# Patient Record
Sex: MALE | Race: WHITE | NOT HISPANIC OR LATINO | Employment: OTHER | URBAN - METROPOLITAN AREA
[De-identification: names, ages, dates, MRNs, and addresses within clinical notes are randomized per-mention and may not be internally consistent; named-entity substitution may affect disease eponyms.]

---

## 2019-06-13 ENCOUNTER — TELEPHONE (OUTPATIENT)
Dept: FAMILY MEDICINE CLINIC | Facility: CLINIC | Age: 74
End: 2019-06-13

## 2019-07-31 ENCOUNTER — OFFICE VISIT (OUTPATIENT)
Dept: DERMATOLOGY | Facility: CLINIC | Age: 74
End: 2019-07-31
Payer: COMMERCIAL

## 2019-07-31 ENCOUNTER — OFFICE VISIT (OUTPATIENT)
Dept: ENDOCRINOLOGY | Facility: CLINIC | Age: 74
End: 2019-07-31
Payer: COMMERCIAL

## 2019-07-31 VITALS
DIASTOLIC BLOOD PRESSURE: 80 MMHG | HEIGHT: 67 IN | BODY MASS INDEX: 19.97 KG/M2 | WEIGHT: 127.2 LBS | SYSTOLIC BLOOD PRESSURE: 130 MMHG

## 2019-07-31 VITALS — BODY MASS INDEX: 19.93 KG/M2 | WEIGHT: 127 LBS | HEIGHT: 67 IN

## 2019-07-31 DIAGNOSIS — L20.9 ATOPIC DERMATITIS, UNSPECIFIED TYPE: Primary | ICD-10-CM

## 2019-07-31 DIAGNOSIS — L20.9 ATOPIC DERMATITIS, UNSPECIFIED TYPE: ICD-10-CM

## 2019-07-31 DIAGNOSIS — E27.40 ADRENAL INSUFFICIENCY (HCC): Primary | ICD-10-CM

## 2019-07-31 PROCEDURE — 99204 OFFICE O/P NEW MOD 45 MIN: CPT | Performed by: DERMATOLOGY

## 2019-07-31 PROCEDURE — 99204 OFFICE O/P NEW MOD 45 MIN: CPT | Performed by: INTERNAL MEDICINE

## 2019-07-31 RX ORDER — HYDRALAZINE HYDROCHLORIDE 25 MG/1
25 TABLET, FILM COATED ORAL 2 TIMES DAILY
COMMUNITY
End: 2022-01-01 | Stop reason: SDUPTHER

## 2019-07-31 RX ORDER — FERROUS SULFATE 325(65) MG
325 TABLET ORAL
COMMUNITY
Start: 2019-06-20 | End: 2020-01-15

## 2019-07-31 RX ORDER — ATORVASTATIN CALCIUM 80 MG/1
80 TABLET, FILM COATED ORAL DAILY
COMMUNITY
Start: 2018-07-17 | End: 2022-01-01 | Stop reason: SDUPTHER

## 2019-07-31 RX ORDER — PREDNISONE 1 MG/1
5 TABLET ORAL DAILY
Qty: 100 TABLET | Refills: 3 | Status: SHIPPED | OUTPATIENT
Start: 2019-07-31 | End: 2019-09-03

## 2019-07-31 RX ORDER — RIBOFLAVIN (VITAMIN B2) 100 MG
250 TABLET ORAL
COMMUNITY
Start: 2019-06-11 | End: 2020-01-15

## 2019-07-31 RX ORDER — ASPIRIN 81 MG/1
81 TABLET ORAL DAILY
COMMUNITY
Start: 2017-04-16

## 2019-07-31 RX ORDER — OXYCODONE HYDROCHLORIDE 15 MG/1
15 TABLET ORAL EVERY 6 HOURS PRN
COMMUNITY
End: 2020-10-05

## 2019-07-31 RX ORDER — NITROGLYCERIN 0.4 MG/1
0.4 TABLET SUBLINGUAL
COMMUNITY
Start: 2019-05-13

## 2019-07-31 RX ORDER — CLOPIDOGREL BISULFATE 75 MG/1
TABLET ORAL
COMMUNITY
Start: 2019-05-17 | End: 2020-01-15 | Stop reason: ALTCHOICE

## 2019-07-31 RX ORDER — MIRTAZAPINE 30 MG/1
30 TABLET, FILM COATED ORAL
COMMUNITY
Start: 2019-06-19 | End: 2020-01-17 | Stop reason: SDUPTHER

## 2019-07-31 RX ORDER — CARVEDILOL 12.5 MG/1
12.5 TABLET ORAL
COMMUNITY
Start: 2019-06-18 | End: 2020-04-02

## 2019-07-31 RX ORDER — MORPHINE SULFATE 15 MG/1
15 TABLET, FILM COATED, EXTENDED RELEASE ORAL 2 TIMES DAILY
COMMUNITY
Start: 2018-11-11 | End: 2021-01-28 | Stop reason: ALTCHOICE

## 2019-07-31 NOTE — LETTER
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 use for atopic dermatitis  He is having symptoms of adrenal insufficiency since he has not been on steroids for about 6 to 8 weeks  I have asked him to resume prednisone 5 milligrams per day  I did go over sick day management where he should double his dose of the steroids when he is ill  Additionally, he should go to the emergency department if he is unable to take his steroids orally  I gave him a brochure for a medic Alert  Atopic dermatitis  He was seen by Dermatology today with a goal of improving his skin over time and decreasing his steroid dose and/or using steroid sparing medications  Diagnoses and all orders for this visit:    Adrenal insufficiency (HCC)  -     predniSONE 5 mg tablet; Take 1 tablet (5 mg total) by mouth daily    Atopic dermatitis, unspecified type  -     Ambulatory referral to Dermatology; Future    Other orders  -     Ascorbic Acid (VITAMIN C) 100 MG tablet; Take 250 mg by mouth  -     ferrous sulfate 325 (65 Fe) mg tablet; Take 325 mg by mouth  -     atorvastatin (LIPITOR) 80 mg tablet; Take 80 mg by mouth daily  -     aspirin (ASPIRIN 81) 81 mg EC tablet; Take 81 mg by mouth daily  -     carvedilol (COREG) 12 5 mg tablet;  Take 12 5 mg by mouth  -     clopidogrel (PLAVIX) 75 mg tablet; TAKE 1 TABLET BY MOUTH DAILY  - hydrALAZINE (APRESOLINE) 25 mg tablet; Take 25 mg by mouth 2 (two) times a day  -     Mirabegron ER (MYRBETRIQ) 50 MG TB24; Take 50 mg by mouth daily  -     mirtazapine (REMERON) 30 mg tablet; Take 30 mg by mouth  -     morphine (MS CONTIN) 15 mg 12 hr tablet; Take 15 mg by mouth 2 (two) times a day  -     oxyCODONE (ROXICODONE) 15 mg immediate release tablet; Take 15 mg by mouth every 6 (six) hours as needed  -     nitroglycerin (NITROSTAT) 0 4 mg SL tablet; Place 0 4 mg under the tongue          Subjective:      Patient ID: Nathaniel Dodson is a 68 y o  male  68-year-old male with a history of atopic dermatitis since he was in his 25s  He was initially treated with large doses of steroids  He eventually developed secondary adrenal insufficiency for which he was started on prednisone in 2012  About eight weeks ago, his steroids were stopped  Since then, he has lost 25 pounds, has nausea and decreased appetite  He denies any lightheadedness or dizziness  There is no family history of adrenal insufficiency  The following portions of the patient's history were reviewed and updated as appropriate: allergies, current medications, past family history, past medical history, past social history, past surgical history and problem list     Review of Systems   Constitutional: Negative for chills and fever  Respiratory: Negative for shortness of breath  Cardiovascular: Negative for chest pain  Gastrointestinal: Negative for constipation, diarrhea, nausea and vomiting  He does have nausea  All other systems reviewed and are negative  Objective:      /80   Ht 5' 7" (1 702 m)   Wt 57 7 kg (127 lb 3 2 oz)   BMI 19 92 kg/m²           Physical Exam   Constitutional: He is oriented to person, place, and time  He appears well-developed and well-nourished  No distress  HENT:   Head: Normocephalic and atraumatic     Mouth/Throat: Oropharynx is clear and moist and mucous membranes are normal  No oropharyngeal exudate  Eyes: Conjunctivae, EOM and lids are normal  Right eye exhibits no discharge  Left eye exhibits no discharge  No scleral icterus  Neck: Neck supple  No thyromegaly present  Cardiovascular: Normal rate, regular rhythm and normal heart sounds  Exam reveals no gallop and no friction rub  No murmur heard  Pulmonary/Chest: Effort normal and breath sounds normal  No respiratory distress  He has no wheezes  Abdominal: Soft  Bowel sounds are normal  He exhibits no distension  There is no tenderness  Musculoskeletal: Normal range of motion  He exhibits no edema, tenderness or deformity  Lymphadenopathy:        Head (right side): No occipital adenopathy present  Head (left side): No occipital adenopathy present  Right: No supraclavicular adenopathy present  Left: No supraclavicular adenopathy present  Neurological: He is alert and oriented to person, place, and time  No cranial nerve deficit  Coordination normal    Skin: Skin is warm and intact  He is not diaphoretic  No erythema  Multiple areas of atopic dermatitis  Psychiatric: He has a normal mood and affect  His behavior is normal    Vitals reviewed

## 2019-07-31 NOTE — PROGRESS NOTES
[address] [facility] Note     Patient Name: Nilo Guo  Encounter Date: 07/31/2019    Today's Chief Concerns:  Aerigona Concern #1: History atopic dermatitis       Past Medical History:  Have you ever had or currently have any of the following medical conditions or treatments? · HIV/AIDS: No  · Hepatitis B: No  · Hepatitis C: No   · Diabetes: YES  · Tuberculosis: No  · Biologic Therapy/Chemotherapy: No  · Organ or Bone Marrow Transplantation: No  · Radiation Treatment: No  · Cancer (If Yes, which types)- YES, prostate cancer   · Booker's disease: Yes   · Hypertension: Yes      Have you ever had any of the following skin conditions? · Melanoma? (If Yes, please provide more detail)- No  · Basal Cell Carcinoma: No  · Squamous Cell Carcinoma: No  · Sebaceous Cell Carcinoma: No  · Merkel Cell Carcinoma: No  · Angiosarcoma: No  · Blistering Sunburns: No  · Eczema: YES  · Psoriasis: No    Social History:     What is your current Smoking Status? Current every day     What is/was your primary occupation? Retired     What are your hobbies/past-times? Family history:  Do any of your "first degree relatives" (parent, brother, sister, or child) have any of the following conditions? · Melanoma? (If Yes, which relatives?) No  · Eczema: No  · Asthma: No  · Hay Fever/Seasonal Allergies: No  · Psoriasis: No  · Arthritis: No  · Thyroid Problems: No  · Lupus/Connective Tissue Disease: No  · Diabetes: YES  · Stroke: No  · Blood Clots: No  · IBD/Crohn's/Ulcerative Colitis: No  · Vitiligo: No  · Scarring/Keloids: No  · Severe Acne: No  · Pancreatic Cancer: No  · Other known Skin Condition? If Yes, what condition and which relatives?   No  · Lupus: yes     Current Medications:    Current Outpatient Medications:     Ascorbic Acid (VITAMIN C) 100 MG tablet, Take 250 mg by mouth, Disp: , Rfl:     aspirin (ASPIRIN 81) 81 mg EC tablet, Take 81 mg by mouth daily, Disp: , Rfl:     atorvastatin (LIPITOR) 80 mg tablet, Take 80 mg by mouth daily, Disp: , Rfl:     carvedilol (COREG) 12 5 mg tablet, Take 12 5 mg by mouth, Disp: , Rfl:     clopidogrel (PLAVIX) 75 mg tablet, TAKE 1 TABLET BY MOUTH DAILY, Disp: , Rfl:     ferrous sulfate 325 (65 Fe) mg tablet, Take 325 mg by mouth, Disp: , Rfl:     hydrALAZINE (APRESOLINE) 25 mg tablet, Take 25 mg by mouth 2 (two) times a day, Disp: , Rfl:     Mirabegron ER (MYRBETRIQ) 50 MG TB24, Take 50 mg by mouth daily, Disp: , Rfl:     mirtazapine (REMERON) 30 mg tablet, Take 30 mg by mouth, Disp: , Rfl:     morphine (MS CONTIN) 15 mg 12 hr tablet, Take 15 mg by mouth 2 (two) times a day, Disp: , Rfl:     nitroglycerin (NITROSTAT) 0 4 mg SL tablet, Place 0 4 mg under the tongue, Disp: , Rfl:     oxyCODONE (ROXICODONE) 15 mg immediate release tablet, Take 15 mg by mouth every 6 (six) hours as needed, Disp: , Rfl:     predniSONE 5 mg tablet, Take 1 tablet (5 mg total) by mouth daily, Disp: 100 tablet, Rfl: 3    Specific Alerts:    Are you pregnant or planning to become pregnant? N/A    Are you currently or planning to be nursing or breast feeding? N/A    Allergies   Allergen Reactions    Baclofen      Sedation toxicity     Bupropion      Violent     Fentanyl Myalgia     "i lost control of my left arm and left leg"    Pregabalin      Weight gain, become manic       May we call your Preferred Phone number to discuss your specific medical information? YES    May we leave a detailed message that includes your specific medical information? YES    Have you traveled outside of the [facility] in the past 3 months? No    Do you currently have a pacemaker or defibrillator? No    Do you have any artificial heart valves, joints, plates, screws, rods, stents, pins, etc? No   - If Yes, were any placed within the last 2 years? Do you require any medications prior to a surgical procedure? No   - If Yes, for which procedure? - If Yes, what medications to you require?      Are you taking any medications that cause you to bleed more easily ("blood thinners") YES, Plavix    Have you ever experienced a rapid heartbeat with epinephrine? No    Have you ever been treated with "gold" (gold sodium thiomalate) therapy? No    [facility] can help with wrinkles, "laugh lines," facial volume loss, "double chin," "love handles," age spots, and more  Are you interested in learning today about some of the skin enhancement procedures that we offer? (If Yes, please provide more detail) No    Review of Systems:  Have you recently had or currently have any of the following? · Fever or chills: No  · Night Sweats: No  · Headaches: No  · Weight Gain: {No  · Weight Loss: No  · Blurry Vision: No  · Nausea: No  · Vomiting: No  · Diarrhea: No  · Blood in Stool: No  · Abdominal Pain: No  · Itchy Skin: YES  · Painful Joints: No  · Swollen Joints: No  · Muscle Pain: No  · Irregular Mole: No  · Sun Burn: No  · Dry Skin: No  · Skin Color Changes: No  · Scar or Keloid: No  · Cold Sores/Fever Blisters: No  · Bacterial Infections/MRSA: No  · Anxiety: No  · Depression: No  · Suicidal or Homicidal Thoughts: No      PHYSICAL EXAM:      Was a chaperone (Derm Clinical Assistant) present for the entirety of the Physical Exam? YES    Did the Dermatology Team specifically ask and  the patient on the importance of a Full Skin Exam to be sure that nothing is missed clinically?  YES    Did the patient request or accept a Full Skin Exam?  YES    Did the patient specifically refuse to have the areas "under-the-bra" examined by the Dermatologist? No    Did the patient specifically refuse to have the areas "under-the-underwear" examined by the Dermatologist? No      CONSTITUTIONAL:   Vitals:    07/31/19 1155   Weight: 57 6 kg (127 lb)   Height: 5' 7" (1 702 m)           PSYCH: Normal mood and affect  EYES: Normal conjunctiva  ENT: Normal lips and oral mucosa  CARDIOVASCULAR: No edema  RESPIRATORY: Normal respirations  HEME/LYMPH/IMMUNO:  No regional lymphadenopathy except as noted below in ASSESSMENT AND PLAN BY DIAGNOSIS    FULL ORGAN SYSTEM SKIN EXAM (SKIN)  Hair, Scalp, Ears, Face Normal except as noted below in Assessment   Neck, Cervical Chain Nodes Normal except as noted below in Assessment   Right Arm/Hand/Fingers Normal except as noted below in Assessment   Left Arm/Hand/Fingers Normal except as noted below in Assessment   Chest/Breasts/Axillae Viewed areas Normal except as noted below in Assessment   Abdomen, Umbilicus Normal except as noted below in Assessment   Back/Spine Normal except as noted below in Assessment   Groin/Genitalia/Buttocks    Right Leg Normal except as noted below in Assessment   Left Leg Normal except as noted below in Assessment        ASSESSMENT AND PLAN BY DIAGNOSIS:    History of Present Condition:     Duration:  How long has this been an issue for you?    o  since 13years old    Location Affected:  Where on the body is this affecting you?    o  throughout body   Quality:  Is there any bleeding, pain, itch, burning/irritation, or redness associated with the skin lesion? o  itchy   Severity:  Describe any bleeding, pain, itch, burning/irritation, or redness on a scale of 1 to 10 (with 10 being the worst)  o  6   Timing:  Does this condition seem to be there pretty constantly or do you notice it more at specific times throughout the day? o  consistent    Context:  Have you ever noticed that this condition seems to be associated with specific activities you do?    o  denies    Modifying Factors:    o Anything that seems to make the condition worse?    -  denies   o What have you tried to do to make the condition better?    -  oral prednisone    Associated Signs and Symptoms:  Does this skin lesion seem to be associated with any of the following:  o  DERM ASSOCIATED SIGNS AND SYMPTOMS: Redness and Itching and Scratching     1   ATOPIC DERMATITIS    Physical Exam:   Anatomic Location Affected:  Lichenified crusted erythema flexor area of neck, back and diffusely face  Prominent ectropion  Estimated involvement is about 50% TBSA   Morphological Description:     Pertinent Positives:   Pertinent Negatives:  No sign of secondary infection  Additional History of Present Condition:  Patient states his symptoms started when he was 13years old  Patient been on prednisone for years  Assessment and Plan:  Based on a thorough discussion of this condition and the management approach to it (including a comprehensive discussion of the known risks, side effects and potential benefits of treatment), the patient (family) agrees to implement the following specific plan:   Prednisone 20 mg for 1 week, week 2 10 mg and after week 5 mg daily   triamcinolone ointment apply topically twice a day to affected areas    I discussed that she has a life time pattern of atopic dermatitis with erythroderma  There is a a long history of steroid dependence with associated adrenal insufficiency  He is having a generalized flare of disease due to abrupt steroid withdrawal   I suspect that addition of mid potency steroid will bring control of disease to point where patient is confortable and can then taper to targeted prednisone dosage  Dupixent would be option if and only  If we can not proceed with steroid taper  Assessment and Plan:   Atopic Dermatitis is a chronic, itchy skin condition that is very common in children but may occur at any age  It is also known as eczema or atopic eczema   It is the most common form of dermatitis  Atopic dermatitis usually occurs in people who have an atopic tendency    This means they may develop any or all of these closely linked conditions: Atopic dermatitis, asthma, hay fever (allergic rhinitis), eosinophilic esophagitis, and gastroenteritis  Often these conditions run within families with a parent, child or sibling also affected  A family history of asthma, eczema or hay fever is particularly useful in diagnosing atopic dermatitis in infants  Atopic dermatitis arises because of a complex interaction of genetic and environmental factors  These include defects in skin barrier function making the skin more susceptible to irritation by soap and other contact irritants, the weather, temperature and non-specific triggers  There is also an element of immune system dysregulation that is often present  By definition, it is chronic and has a "waxing-waning" nature; flares should be expected but with good education and treatment strategies can be minimized  Some specific tips we discussed:   Dry skin care   Usingonly mild cleansers (hypoallergenic and without fragrances) and fragrance free detergent (not unscented products which contain a masking agent); we discussed avoiding irritants/fragranced products   The importance of regular application of moisturizers daily (at least 3 times a day)   The known and theoretical side effects of steroids at length, including but not limited to atrophy of skin and increased pressure in eye (glaucoma) and clouding of the eye's lens (cataracts) if used in or around the eye for extended durations   The specific over-the-counter interventions and medications   Side effects, risks and benefits of topical and oral medications discussed  EDUCATION AS INTERVENTION! WHAT IS ATOPIC DERMATITIS? Atopic dermatitis (also called eczema) is a condition of the skin where the skin is dry, red, and itchy  The main function of the skin is to provide a barrier from the environment and is also the first defense of the immune system  In atopic dermatitis the skin barrier is decreased or disrupted, and the skin is easily irritated  As a result, moisture escapes the skin more easily, and environmental allergens and microbes can enter the skin more easily  Consequently, the skin's immune system is altered  If there are increased allergic type cells in the skin, the skin may become red and hyper-excitable   This leads to itching and a subsequent rash  WHY DO PEOPLE GET ATOPIC DERMATITIS? There is no single answer because many factors are involved  It is likely a combination of genetic makeup and environmental triggers and/or exposures  Excessive drying or sweating of the skin, Irritating soaps, dust mites, and pet dander are some of the more common triggers  There is no blood test that can be done to confirm this diagnosis  The history and appearance of the skin is usually sufficient for a diagnosis  However, in some cases if the rash does not fit with the history or respond appropriately to treatment, a skin biopsy may be helpful  Many children do outgrow atopic dermatitis or get better; however, many continue to have sensitive skin into adulthood  Asthma and hay fever are often seen in many patients with atopic dermatitis; however, asthma flares do not necessarily occur at the same time as skin flares  PREVENTING FLARES OF ATOPIC DERMATITIS  The first step is to maintain the skin's barrier function  Keep the skin well moisturized  Avoid irritants and triggers  Use prescribed medicine when there are red or rough areas to help the skin to return to normal as quickly as possible  Try to limit scratching  If you keep the skin well moisturized, and avoid coming in contact with things you know irritate your child's skin, there will be less flares  However, some flares of atopic dermatitis are beyond your control  You should work with your health care provider to come up with a plan that minimizes flares while minimizing long term use of medications that suppress the immune system  WHAT ARE SOME OF THE TRIGGERS? Triggers are different for different people  The most common triggers are:   Heat and sweat for some individuals, cold weather for others   House dust mites, pet fur     Wool; synthetic fabrics like nylon; dyed fabrics   Tobacco smoke    Fragrances in: shampoos, soaps, lotions, laundry detergents, fabric softeners   Saliva or prolonged exposure to water  WHAT ABOUT FOOD ALLERGIES? This is a very controversial topic, as many believe that food allergies are responsible for skin flares  In some cases, specific foods may cause worsening of atopic dermatitis; however this occurs in a minority of cases and usually happens within a few hours of ingestion  While food allergy is more common in children with eczema, foods are specific triggers for flares in only a small percentage of children  If you notice that the skin flares after certain foods you can see if eliminating one food at time makes a difference, as long as your child can still enjoy a well-balanced diet  There are blood (RAST) and skin (PRICK) tests that can check for allergies, but they are often positive in children who are not truly allergic  Therefore it is important that you work with your allergist and dermatologist to determine which foods are relevant and causing true symptoms  Extreme food elimination diets without the guidance of your doctor, which have become more popular in recent years, may even result in worsening of the skin rash due to malnutrition and avoidance of essential nutrients  TREATMENT  Treatments are aimed at minimizing exposure to irritating factors and decreasing  the skin inflammation which results in an itchy rash  There are many different treatment options, which depend on your child's rash, its location, and severity  Topical treatments include corticosteroids and steroid-like creams such as Protopic, Elidel, and Eucrisa, which are believed to not thin the skin  Please read the discussions below regarding risks and benefits of all of these creams  Occasionally bacterial or viral infections can occur which flare the skin and require oral and/or topical antibiotics or antivirals   In some cases bleach baths 2-3 times weekly can be helpful to prevent recurrent infection  For severe disease, strong oral medications such as corticosteroids, methotrexate or azathioprine (Imuran) may be needed  These medications require close monitoring and follow-up  You should discuss the risks/ benefits/alternatives of these medications with your health care provider to come up with the best treatment plan for your child  1) Use moisturizer all over the entire body at least THREE TIMES a day  This keeps the skin moisturized to restore the barrier function  Find a cream or ointment that your child likes - this is the most important  The medicines do not work in the bottle  The thicker the moisturizer, generally the better barrier it provides  Ointments often moisturize better than creams; and creams work better than lotions  Lotions are more useful during the summer when thick greasy ointments are uncomfortable  If you put moisturizer on the skin after bathing, while the skin is damp, it is twice as effective  The moisturizer provides a seal holding the water in the skin  You may bathe your child in warm - not hot - water, for short periods of time (no more than 5-10 minutes at a time) once a day if they like  Lightly pat your child dry with a towel and, while the skin is still damp, (within 3 minutes) apply a moisturizer from head to toe  If your child is using a medicated cream, apply it and allow it to absorb completely BEFORE you apply the moisturizer  2) Apply the prescription medication TWICE A DAY to only the red, rough areas on the skin OR AS [place]  Put the medication on your fingers and gently rub it into the areas  Usually the medicine will help an area within a few days time  Try to put the medicine on for two days after you have noticed that the redness is no longer present; this will help the redness from returning    The severity of the rash and the strength and usage of the medication will determine how quickly you see improvement  It is important that you do not overuse steroid creams, and if you notice a thin, shiny appearance to the skin or broken blood vessels, you should stop using the cream and consult your health care provider regarding possible overuse/overthinning of the skin  The face, armpits and groin have particularly thin and sensitive skin and are therefore most at risk for bad results if steroids are over-used in these sites  3) Avoid triggers  Some children have specific things that trigger itching and rashes, while others may have none that can be identified  It may require a little bit of trial and error to see what applies to your child  Also, triggers can change over time for your child  The most common triggers are listed above; start with these  Avoid the use of fabric softeners in the washing machine or dryer sheets (unless they are fragrance-free)  Try to use laundry detergents, soaps and shampoos that are fragrance-free  You may find it helpful to double-rinse your clothes  Some children are sensitive to house dust mites and they may benefit from a plastic mattress wrap  While food allergy is more common in children with eczema, foods are specific triggers for flares in only a small percentage of children  If you notice that the skin flares after certain foods you can see if eliminating one food at time makes a difference, as long as your child can still enjoy a well-balanced diet  4) Consider using a medication like an anti-histamine by mouth to help control the itching  Scratching only makes the skin more reactive and the barrier function even more disrupted  It can cause both children and their parents to lose sleep! There are different types of anti-itch medications  Some cause more drowsiness than others  Both types are acceptable depending on your child and your preference    Start with Benadryl and if that does not work, ask for a prescription antihistamine      5) About the prescription creams:  Corticosteroid creams and ointments (generally things with "-one" or "sherly" on the end of their names): The strength of the cream or ointment depends on the name of the active ingredient  The numbers at the end do not indicate the relative strength  Thus triamcinolone 0 1% ointment, considered a mid-strength corticosteroid, is much stronger than hydrocortisone 1% even though the number following the name is much lower  Topical corticosteroids are very effective in treating atopic dermatitis  When used in the manner prescribed (to rashy areas of skin and for no more than a few weeks at a time to any one area) they are very safe  These are corticosteroids and are anti-inflammatory, not the anabolic steroids like those used illegally by some athletes  Topical non-steroid creams and ointments (immunomodulators): These creams and ointments are also called topical calcineurin inhibitors (TCIs)  These include Protopic ointment and Elidel cream  Crisaborole 2% Tomas Dominique) is a prescription ointment that targets an enzyme called PDE4 (phosphodiesterase 4)  It is used on the skin topically to treat mild-to-moderate eczema in adults and children 3years of age and older  In total, these nonsteroidal prescriptions are used to help decrease itching and redness in the skin  They are not as strong as most steroid creams; however, it is believed that they do not thin the skin when overused  They are generally used as second-line medications, though they may be used alone or in conjunction with topical steroids  In sensitive areas such as the face, underarms or groin, they are often recommended  They can sting inflamed skin, but are generally well tolerated once the skin is healing  The FDA placed a black-box warning on both Elidel and Protopic in 2006 based on animal studies using the medications    Some animals developed skin cancer and lymphoma  Subsequently, the FDA released a statement that there is no causal relationship between the two medications and cancer  Because of this concern, there are ongoing studies to evaluate this relationship in humans  So far, there are studies that support the safety of these medications  One showed that the rates of cancer in patient using these medications topically were less than the rates of the general population and another showed that in patient's using the medication over a large area of the body, the levels of the medication in the blood was undetectable  As for Eucrisa, this product is only approved for the topical treatment of mild-to-moderate eczema in patients 3years of age and older; use of the medication in kids younger than 2 is considered off label and has not been formally studied  Burning and stinging are the most commonly reported side effects of this medication  Rarely, this product has been known to cause hives and hypersensitivity reactions; discontinue its use if you develop severe itching, swelling, or redness in the area of application      Scribe Attestation    I,:   [email or web address] am acting as a scribe while in the presence of the attending physician :        I,:   Shaun Kat MD personally performed the services described in this documentation    as scribed in my presence :

## 2019-07-31 NOTE — PATIENT INSTRUCTIONS
Based on a thorough discussion of this condition and the management approach to it (including a comprehensive discussion of the known risks, side effects and potential benefits of treatment), the patient (family) agrees to implement the following specific plan:   Prednisone 20 mg for 1 week, week 2 10 mg and after week 5 mg daily   triamcinolone ointment apply topically twice a day to affected areas      Assessment and Plan:   Atopic Dermatitis is a chronic, itchy skin condition that is very common in children but may occur at any age  It is also known as eczema or atopic eczema   It is the most common form of dermatitis  Atopic dermatitis usually occurs in people who have an atopic tendency    This means they may develop any or all of these closely linked conditions: Atopic dermatitis, asthma, hay fever (allergic rhinitis), eosinophilic esophagitis, and gastroenteritis  Often these conditions run within families with a parent, child or sibling also affected  A family history of asthma, eczema or hay fever is particularly useful in diagnosing atopic dermatitis in infants  Atopic dermatitis arises because of a complex interaction of genetic and environmental factors  These include defects in skin barrier function making the skin more susceptible to irritation by soap and other contact irritants, the weather, temperature and non-specific triggers  There is also an element of immune system dysregulation that is often present  By definition, it is chronic and has a "waxing-waning" nature; flares should be expected but with good education and treatment strategies can be minimized  Some specific tips we discussed:   Dry skin care   Usingonly mild cleansers (hypoallergenic and without fragrances) and fragrance free detergent (not unscented products which contain a masking agent); we discussed avoiding irritants/fragranced products     The importance of regular application of moisturizers daily (at least 3 times a day)   The known and theoretical side effects of steroids at length, including but not limited to atrophy of skin and increased pressure in eye (glaucoma) and clouding of the eye's lens (cataracts) if used in or around the eye for extended durations   The specific over-the-counter interventions and medications   Side effects, risks and benefits of topical and oral medications discussed  EDUCATION AS INTERVENTION! WHAT IS ATOPIC DERMATITIS? Atopic dermatitis (also called eczema) is a condition of the skin where the skin is dry, red, and itchy  The main function of the skin is to provide a barrier from the environment and is also the first defense of the immune system  In atopic dermatitis the skin barrier is decreased or disrupted, and the skin is easily irritated  As a result, moisture escapes the skin more easily, and environmental allergens and microbes can enter the skin more easily  Consequently, the skin's immune system is altered  If there are increased allergic type cells in the skin, the skin may become red and hyper-excitable   This leads to itching and a subsequent rash  WHY DO PEOPLE GET ATOPIC DERMATITIS? There is no single answer because many factors are involved  It is likely a combination of genetic makeup and environmental triggers and/or exposures  Excessive drying or sweating of the skin, Irritating soaps, dust mites, and pet dander are some of the more common triggers  There is no blood test that can be done to confirm this diagnosis  The history and appearance of the skin is usually sufficient for a diagnosis  However, in some cases if the rash does not fit with the history or respond appropriately to treatment, a skin biopsy may be helpful  Many children do outgrow atopic dermatitis or get better; however, many continue to have sensitive skin into adulthood    Asthma and hay fever are often seen in many patients with atopic dermatitis; however, asthma flares do not necessarily occur at the same time as skin flares  PREVENTING FLARES OF ATOPIC DERMATITIS  The first step is to maintain the skin's barrier function  Keep the skin well moisturized  Avoid irritants and triggers  Use prescribed medicine when there are red or rough areas to help the skin to return to normal as quickly as possible  Try to limit scratching  If you keep the skin well moisturized, and avoid coming in contact with things you know irritate your child's skin, there will be less flares  However, some flares of atopic dermatitis are beyond your control  You should work with your health care provider to come up with a plan that minimizes flares while minimizing long term use of medications that suppress the immune system  WHAT ARE SOME OF THE TRIGGERS? Triggers are different for different people  The most common triggers are:   Heat and sweat for some individuals, cold weather for others   House dust mites, pet fur   Wool; synthetic fabrics like nylon; dyed fabrics   Tobacco smoke    Fragrances in: shampoos, soaps, lotions, laundry detergents, fabric softeners   Saliva or prolonged exposure to water  WHAT ABOUT FOOD ALLERGIES? This is a very controversial topic, as many believe that food allergies are responsible for skin flares  In some cases, specific foods may cause worsening of atopic dermatitis; however this occurs in a minority of cases and usually happens within a few hours of ingestion  While food allergy is more common in children with eczema, foods are specific triggers for flares in only a small percentage of children  If you notice that the skin flares after certain foods you can see if eliminating one food at time makes a difference, as long as your child can still enjoy a well-balanced diet  There are blood (RAST) and skin (PRICK) tests that can check for allergies, but they are often positive in children who are not truly allergic   Therefore it is important that you work with your allergist and dermatologist to determine which foods are relevant and causing true symptoms  Extreme food elimination diets without the guidance of your doctor, which have become more popular in recent years, may even result in worsening of the skin rash due to malnutrition and avoidance of essential nutrients  TREATMENT  Treatments are aimed at minimizing exposure to irritating factors and decreasing  the skin inflammation which results in an itchy rash  There are many different treatment options, which depend on your child's rash, its location, and severity  Topical treatments include corticosteroids and steroid-like creams such as Protopic, Elidel, and Eucrisa, which are believed to not thin the skin  Please read the discussions below regarding risks and benefits of all of these creams  Occasionally bacterial or viral infections can occur which flare the skin and require oral and/or topical antibiotics or antivirals  In some cases bleach baths 2-3 times weekly can be helpful to prevent recurrent infection  For severe disease, strong oral medications such as corticosteroids, methotrexate or azathioprine (Imuran) may be needed  These medications require close monitoring and follow-up  You should discuss the risks/ benefits/alternatives of these medications with your health care provider to come up with the best treatment plan for your child  1) Use moisturizer all over the entire body at least THREE TIMES a day  This keeps the skin moisturized to restore the barrier function  Find a cream or ointment that your child likes - this is the most important  The medicines do not work in the bottle  The thicker the moisturizer, generally the better barrier it provides  Ointments often moisturize better than creams; and creams work better than lotions  Lotions are more useful during the summer when thick greasy ointments are uncomfortable    If you put moisturizer on the skin after bathing, while the skin is damp, it is twice as effective  The moisturizer provides a seal holding the water in the skin  You may bathe your child in warm - not hot - water, for short periods of time (no more than 5-10 minutes at a time) once a day if they like  Lightly pat your child dry with a towel and, while the skin is still damp, (within 3 minutes) apply a moisturizer from head to toe  If your child is using a medicated cream, apply it and allow it to absorb completely BEFORE you apply the moisturizer  2) Apply the prescription medication TWICE A DAY to only the red, rough areas on the skin OR AS [place]  Put the medication on your fingers and gently rub it into the areas  Usually the medicine will help an area within a few days time  Try to put the medicine on for two days after you have noticed that the redness is no longer present; this will help the redness from returning  The severity of the rash and the strength and usage of the medication will determine how quickly you see improvement  It is important that you do not overuse steroid creams, and if you notice a thin, shiny appearance to the skin or broken blood vessels, you should stop using the cream and consult your health care provider regarding possible overuse/overthinning of the skin  The face, armpits and groin have particularly thin and sensitive skin and are therefore most at risk for bad results if steroids are over-used in these sites  3) Avoid triggers  Some children have specific things that trigger itching and rashes, while others may have none that can be identified  It may require a little bit of trial and error to see what applies to your child  Also, triggers can change over time for your child  The most common triggers are listed above; start with these  Avoid the use of fabric softeners in the washing machine or dryer sheets (unless they are fragrance-free)    Try to use laundry detergents, soaps and shampoos that are fragrance-free  You may find it helpful to double-rinse your clothes  Some children are sensitive to house dust mites and they may benefit from a plastic mattress wrap  While food allergy is more common in children with eczema, foods are specific triggers for flares in only a small percentage of children  If you notice that the skin flares after certain foods you can see if eliminating one food at time makes a difference, as long as your child can still enjoy a well-balanced diet  4) Consider using a medication like an anti-histamine by mouth to help control the itching  Scratching only makes the skin more reactive and the barrier function even more disrupted  It can cause both children and their parents to lose sleep! There are different types of anti-itch medications  Some cause more drowsiness than others  Both types are acceptable depending on your child and your preference  Start with Benadryl and if that does not work, ask for a prescription antihistamine      5) About the prescription creams:  Corticosteroid creams and ointments (generally things with "-one" or "sherly" on the end of their names): The strength of the cream or ointment depends on the name of the active ingredient  The numbers at the end do not indicate the relative strength  Thus triamcinolone 0 1% ointment, considered a mid-strength corticosteroid, is much stronger than hydrocortisone 1% even though the number following the name is much lower  Topical corticosteroids are very effective in treating atopic dermatitis  When used in the manner prescribed (to rashy areas of skin and for no more than a few weeks at a time to any one area) they are very safe  These are corticosteroids and are anti-inflammatory, not the anabolic steroids like those used illegally by some athletes  Topical non-steroid creams and ointments (immunomodulators):   These creams and ointments are also called topical calcineurin inhibitors (TCIs)  These include Protopic ointment and Elidel cream  Crisaborole 2% [place]) is a prescription ointment that targets an enzyme called PDE4 (phosphodiesterase 4)  It is used on the skin topically to treat mild-to-moderate eczema in adults and children 3years of age and older  In total, these nonsteroidal prescriptions are used to help decrease itching and redness in the skin  They are not as strong as most steroid creams; however, it is believed that they do not thin the skin when overused  They are generally used as second-line medications, though they may be used alone or in conjunction with topical steroids  In sensitive areas such as the face, underarms or groin, they are often recommended  They can sting inflamed skin, but are generally well tolerated once the skin is healing  The FDA placed a black-box warning on both Elidel and Protopic in 2006 based on animal studies using the medications  Some animals developed skin cancer and lymphoma  Subsequently, the FDA released a statement that there is no causal relationship between the two medications and cancer  Because of this concern, there are ongoing studies to evaluate this relationship in humans  So far, there are studies that support the safety of these medications  One showed that the rates of cancer in patient using these medications topically were less than the rates of the general population and another showed that in patient's using the medication over a large area of the body, the levels of the medication in the blood was undetectable  As for Eucrisa, this product is only approved for the topical treatment of mild-to-moderate eczema in patients 3years of age and older; use of the medication in kids younger than 2 is considered off label and has not been formally studied  Burning and stinging are the most commonly reported side effects of this medication    Rarely, this product has been known to cause hives and hypersensitivity reactions; discontinue its use if you develop severe itching, swelling, or redness in the area of application

## 2019-07-31 NOTE — ASSESSMENT & PLAN NOTE
He was seen by Dermatology today with a goal of improving his skin over time and decreasing his steroid dose and/or using steroid sparing medications

## 2019-07-31 NOTE — ASSESSMENT & PLAN NOTE
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

## 2019-07-31 NOTE — PROGRESS NOTES
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 steroid use for atopic dermatitis  He is having symptoms of adrenal insufficiency since he has not been on steroids for about 6 to 8 weeks  I have asked him to resume prednisone 5 milligrams per day  I did go over sick day management where he should double his dose of the steroids when he is ill  Additionally, he should go to the emergency department if he is unable to take his steroids orally  I gave him a brochure for a medic Alert  Atopic dermatitis  He was seen by Dermatology today with a goal of improving his skin over time and decreasing his steroid dose and/or using steroid sparing medications  Diagnoses and all orders for this visit:    Adrenal insufficiency (HCC)  -     predniSONE 5 mg tablet; Take 1 tablet (5 mg total) by mouth daily    Atopic dermatitis, unspecified type  -     Ambulatory referral to Dermatology; Future    Other orders  -     Ascorbic Acid (VITAMIN C) 100 MG tablet; Take 250 mg by mouth  -     ferrous sulfate 325 (65 Fe) mg tablet; Take 325 mg by mouth  -     atorvastatin (LIPITOR) 80 mg tablet; Take 80 mg by mouth daily  -     aspirin (ASPIRIN 81) 81 mg EC tablet; Take 81 mg by mouth daily  -     carvedilol (COREG) 12 5 mg tablet; Take 12 5 mg by mouth  -     clopidogrel (PLAVIX) 75 mg tablet; TAKE 1 TABLET BY MOUTH DAILY  -     hydrALAZINE (APRESOLINE) 25 mg tablet; Take 25 mg by mouth 2 (two) times a day  -     Mirabegron ER (MYRBETRIQ) 50 MG TB24; Take 50 mg by mouth daily  -     mirtazapine (REMERON) 30 mg tablet; Take 30 mg by mouth  -     morphine (MS CONTIN) 15 mg 12 hr tablet; Take 15 mg by mouth 2 (two) times a day  -     oxyCODONE (ROXICODONE) 15 mg immediate release tablet;  Take 15 mg by mouth every 6 (six) hours as needed  -     nitroglycerin (NITROSTAT) 0 4 mg SL tablet; Place 0 4 mg under the tongue          Subjective:      Patient ID: Juan Crowley is a 68 y o  male  79-year-old male with a history of atopic dermatitis since he was in his 25s  He was initially treated with large doses of steroids  He eventually developed secondary adrenal insufficiency for which he was started on prednisone in 2012  About eight weeks ago, his steroids were stopped  Since then, he has lost 25 pounds, has nausea and decreased appetite  He denies any lightheadedness or dizziness  There is no family history of adrenal insufficiency  The following portions of the patient's history were reviewed and updated as appropriate: allergies, current medications, past family history, past medical history, past social history, past surgical history and problem list     Review of Systems   Constitutional: Negative for chills and fever  Respiratory: Negative for shortness of breath  Cardiovascular: Negative for chest pain  Gastrointestinal: Negative for constipation, diarrhea, nausea and vomiting  He does have nausea  All other systems reviewed and are negative  Objective:      /80   Ht 5' 7" (1 702 m)   Wt 57 7 kg (127 lb 3 2 oz)   BMI 19 92 kg/m²          Physical Exam   Constitutional: He is oriented to person, place, and time  He appears well-developed and well-nourished  No distress  HENT:   Head: Normocephalic and atraumatic  Mouth/Throat: Oropharynx is clear and moist and mucous membranes are normal  No oropharyngeal exudate  Eyes: Conjunctivae, EOM and lids are normal  Right eye exhibits no discharge  Left eye exhibits no discharge  No scleral icterus  Neck: Neck supple  No thyromegaly present  Cardiovascular: Normal rate, regular rhythm and normal heart sounds  Exam reveals no gallop and no friction rub  No murmur heard  Pulmonary/Chest: Effort normal and breath sounds normal  No respiratory distress  He has no wheezes  Abdominal: Soft  Bowel sounds are normal  He exhibits no distension  There is no tenderness     Musculoskeletal: Normal range of motion  He exhibits no edema, tenderness or deformity  Lymphadenopathy:        Head (right side): No occipital adenopathy present  Head (left side): No occipital adenopathy present  Right: No supraclavicular adenopathy present  Left: No supraclavicular adenopathy present  Neurological: He is alert and oriented to person, place, and time  No cranial nerve deficit  Coordination normal    Skin: Skin is warm and intact  He is not diaphoretic  No erythema  Multiple areas of atopic dermatitis  Psychiatric: He has a normal mood and affect  His behavior is normal    Vitals reviewed

## 2019-09-03 ENCOUNTER — OFFICE VISIT (OUTPATIENT)
Dept: DERMATOLOGY | Facility: CLINIC | Age: 74
End: 2019-09-03
Payer: COMMERCIAL

## 2019-09-03 VITALS — TEMPERATURE: 97.8 F | BODY MASS INDEX: 20.4 KG/M2 | WEIGHT: 130 LBS | HEIGHT: 67 IN

## 2019-09-03 DIAGNOSIS — L20.9 ATOPIC DERMATITIS, UNSPECIFIED TYPE: Primary | ICD-10-CM

## 2019-09-03 PROCEDURE — 99213 OFFICE O/P EST LOW 20 MIN: CPT | Performed by: DERMATOLOGY

## 2019-09-03 RX ORDER — BETAMETHASONE VALERATE 0.1 %
LOTION (ML) TOPICAL
Qty: 60 ML | Refills: 3 | Status: SHIPPED | OUTPATIENT
Start: 2019-09-03 | End: 2021-04-12 | Stop reason: ALTCHOICE

## 2019-09-03 RX ORDER — BETAMETHASONE VALERATE 0.1 %
LOTION (ML) TOPICAL
Qty: 60 ML | Refills: 3 | Status: SHIPPED | OUTPATIENT
Start: 2019-09-03 | End: 2019-09-03

## 2019-09-03 RX ORDER — TACROLIMUS 1 MG/G
OINTMENT TOPICAL
Qty: 100 G | Refills: 3 | Status: SHIPPED | OUTPATIENT
Start: 2019-09-03 | End: 2019-09-03

## 2019-09-03 RX ORDER — TACROLIMUS 1 MG/G
OINTMENT TOPICAL
Qty: 100 G | Refills: 3 | Status: SHIPPED | OUTPATIENT
Start: 2019-09-03 | End: 2021-01-01 | Stop reason: ALTCHOICE

## 2019-09-03 RX ORDER — PREDNISONE 1 MG/1
TABLET ORAL
Qty: 60 TABLET | Refills: 0 | Status: SHIPPED | OUTPATIENT
Start: 2019-09-03 | End: 2019-10-22 | Stop reason: SDUPTHER

## 2019-09-03 RX ORDER — PREDNISONE 1 MG/1
TABLET ORAL
Qty: 60 TABLET | Refills: 0 | Status: SHIPPED | OUTPATIENT
Start: 2019-09-03 | End: 2019-09-03

## 2019-09-03 NOTE — PROGRESS NOTES
[facility] Note     Patient Name: Jodie Pleitez  Encounter Date: 09/03/2019    Today's Chief Concerns:  [place] [facility]use Concern #1:  Follow up Atopic dermatitis    Current Medications:    Current Outpatient Medications:     Ascorbic Acid (VITAMIN C) 100 MG tablet, Take 250 mg by mouth, Disp: , Rfl:     aspirin (ASPIRIN 81) 81 mg EC tablet, Take 81 mg by mouth daily, Disp: , Rfl:     atorvastatin (LIPITOR) 80 mg tablet, Take 80 mg by mouth daily, Disp: , Rfl:     carvedilol (COREG) 12 5 mg tablet, Take 12 5 mg by mouth, Disp: , Rfl:     clopidogrel (PLAVIX) 75 mg tablet, TAKE 1 TABLET BY MOUTH DAILY, Disp: , Rfl:     ferrous sulfate 325 (65 Fe) mg tablet, Take 325 mg by mouth, Disp: , Rfl:     hydrALAZINE (APRESOLINE) 25 mg tablet, Take 25 mg by mouth 2 (two) times a day, Disp: , Rfl:     Mirabegron ER (MYRBETRIQ) 50 MG TB24, Take 50 mg by mouth daily, Disp: , Rfl:     mirtazapine (REMERON) 30 mg tablet, Take 30 mg by mouth, Disp: , Rfl:     morphine (MS CONTIN) 15 mg 12 hr tablet, Take 15 mg by mouth 2 (two) times a day, Disp: , Rfl:     nitroglycerin (NITROSTAT) 0 4 mg SL tablet, Place 0 4 mg under the tongue, Disp: , Rfl:     oxyCODONE (ROXICODONE) 15 mg immediate release tablet, Take 15 mg by mouth every 6 (six) hours as needed, Disp: , Rfl:     predniSONE 5 mg tablet, Take 1 tablet (5 mg total) by mouth daily, Disp: 100 tablet, Rfl: 3    triamcinolone (KENALOG) 0 1 % ointment, Apply topically to affected areas twice a day, Disp: 453 6 g, Rfl: 6    Specific Alerts:    Have you been seen by a [facility]ologist in the last 3 years? YES    Are you pregnant or planning to become pregnant? N/A    Are you currently or planning to be nursing or breast feeding?  N/A    Allergies   Allergen Reactions    Baclofen      Sedation toxicity     Bupropion      Violent     Fentanyl Myalgia     "i lost control of my left arm and left leg"    Pregabalin      Weight gain, become manic CONSTITUTIONAL:   Vitals:    09/03/19 0958   Temp: 97 8 °F (36 6 °C)   TempSrc: Tympanic   Weight: 59 kg (130 lb)   Height: 5' 7" (1 702 m)       Today's Height:   5' 7"  Today's Weight (in kilograms):   59 kg  Today's Temperature:   97 8 F    PSYCH: Normal mood and affect  EYES: Normal conjunctiva  ENT: Normal lips and oral mucosa  CARDIOVASCULAR: No edema  RESPIRATORY: Normal respirations  HEME/LYMPH/IMMUNO:  No regional lymphadenopathy except as noted below in ASSESSMENT AND PLAN BY DIAGNOSIS    FULL ORGAN SYSTEM SKIN EXAM (SKIN)  Hair, Scalp, Ears, Face Normal except as noted below in Assessment   Neck, Cervical Chain Nodes Normal except as noted below in Assessment   Right Arm/Hand/Fingers Normal except as noted below in Assessment   Left Arm/Hand/Fingers Normal except as noted below in Assessment   Chest/Breasts/Axillae Viewed areas Normal except as noted below in Assessment   Abdomen, Umbilicus Normal except as noted below in Assessment   Back/Spine Normal except as noted below in Assessment   Groin/Genitalia/Buttocks Viewed areas Normal except as noted below in Assessment   Right Leg, Foot, Toes Normal except as noted below in Assessment   Left Leg, Foot, Toes Normal except as noted below in Assessment        ASSESSMENT AND PLAN BY DIAGNOSIS:    History of Present Condition:       1  ATOPIC DERMATITIS     Physical Exam:   Anatomic Location Affected:  Legs and arms   Morphological Description:  Diffuse xerosis, atrophy popliteal space   Pertinent Positives:   Pertinent Negatives:No regional lymphadenopathy    Additional History of Present Condition:  Rash is almost gone, but still having a lot of itching   90% improved      Assessment and Plan:  Based on a thorough discussion of this condition and the management approach to it (including a comprehensive discussion of the known risks, side effects and potential benefits of treatment), the patient (family) agrees to implement the following specific plan:   Prednisone 5 mg daily   Valisone lotion twice a day   Protopic ointment twice a day   Continue Triamcinolone ointment twice daily   We had and extensive discussion about trying to minmize systemic steroids  I plan to maximize topicals including non steroidal tacrolimus  Dupixent would also be a theoretical option if failure to remain on maitenance corticosteroids  Assessment and Plan:   Atopic Dermatitis is a chronic, itchy skin condition that is very common in children but may occur at any age  It is also known as eczema or atopic eczema   It is the most common form of dermatitis  Atopic dermatitis usually occurs in people who have an atopic tendency    This means they may develop any or all of these closely linked conditions: Atopic dermatitis, asthma, hay fever (allergic rhinitis), eosinophilic esophagitis, and gastroenteritis  Often these conditions run within families with a parent, child or sibling also affected  A family history of asthma, eczema or hay fever is particularly useful in diagnosing atopic dermatitis in infants  Atopic dermatitis arises because of a complex interaction of genetic and environmental factors  These include defects in skin barrier function making the skin more susceptible to irritation by soap and other contact irritants, the weather, temperature and non-specific triggers  There is also an element of immune system dysregulation that is often present  By definition, it is chronic and has a "waxing-waning" nature; flares should be expected but with good education and treatment strategies can be minimized  Some specific tips we discussed:   Dry skin care   Usingonly mild cleansers (hypoallergenic and without fragrances) and fragrance free detergent (not unscented products which contain a masking agent); we discussed avoiding irritants/fragranced products     The importance of regular application of moisturizers daily (at least 3 times a day)   The known and theoretical side effects of steroids at length, including but not limited to atrophy of skin and increased pressure in eye (glaucoma) and clouding of the eye's lens (cataracts) if used in or around the eye for extended durations   The specific over-the-counter interventions and medications   Side effects, risks and benefits of topical and oral medications discussed   After lengthy discussion of etiology and treatment options, we decided to implement the following personalized treatment plan:      YOUR PERSONALIZED ECZEMA ACTION PLAN    FOR ACUTE FLARING    1) Antimicrobials  a) None    b) Clindamycin 75mg/5mL solution:  Take by mouth THREE TIMES A DAY for 10 days straight to help reduce the amount of presumed Staph aureus colonizing the skin  c) Bleach baths:  Soak in a bleach bath (recipe provided via email) about 3 times a week for about 5-10 minutes a day; crucially, make sure to rinse thoroughly with fresh water and apply moisturizer within 3 minutes of toweling off gently  2) Anti-Inflammatories  a) During periods of acute flaring, apply the Hydrocortisone 2 5% ointment to the face and neck TWICE A DAY for 2 weeks straight  To give you an idea of how much medication to use: You should be using at least a single full 30-gram tube of this medication EACH WEEK  b) During periods of acute flaring, apply the Triamcinolone 0 1% ointment to the body TWICE A DAY for 2 weeks straight  To give you an idea of how much medication to use: You should be using at least two full 30-gram tubes of this medication EACH WEEK  Do NOT apply this stronger medication to the face or groin area as the skin is too thin and at greater risk for side effects  3) Moisturizers  a) Apply Eucerin cream or Aquaphor ointment at least 3 times a day  It is best to use moisturizers and prescription medications at DIFFERENT times during the day (ideally, about 30 minutes apart)   If you must apply your prescription medication and your moisturizer at the same time, then ALWAYS apply the prescription medication FIRST (i e , directly to the skin); as we discussed, this allows the prescription medication to reach the skin without being blocked by the thick moisturizer! 4) Oral Antihistamines  a) Cetirizine (Zyrtec): Take 5 mL (1 teaspoon) of 5mg/5mL oral suspension EACH MORNING through allergy season  b) Hydroxyzine (Atarax): Take 5 mL (1 teaspoon) of 12 5mg/5mL oral solution about 1 hour before bedtime for the next 3-5 evenings to help reduce scratching  FOR CHRONIC MAINTENANCE    1) Antimicrobials  a) None    b) Bleach baths:  Soak in a bleach bath (recipe provided via email) about 3 times a week for about 5-10 minutes a day; crucially, make sure to rinse thoroughly with fresh water and apply moisturizer within 3 minutes of toweling off gently  2) Anti-Inflammatories  a) Once in the chronic maintenance phase apply Hydrocortisone 2 5% ointment to the face ONCE A DAY and only on Mondays, Wednesdays and Fridays to help decrease the inflammation; try to decrease to [facility] and Fridays if possible  b) Once in the chronic maintenance phase apply Triamcinolone 0 1% ointment to the body ONCE A DAY and only on Mondays, Wednesdays and Fridays to help decrease the inflammation; try to decrease to [facility] and Fridays if possible  Do NOT apply this stronger medication to your face or groin area as the skin is too thin and at greater risk for side effects  c) Apply crisaborole 2% Orene Girt) ointment TWICE A DAY on Tuesdays, Thursdays, Saturdays and Sundays (i e , the days you are not applying a topical steroid) to both the face/neck and body  As we discussed, this product is approved for the topical treatment of mild-to-moderate eczema in patients 3years of age and older; use of the medication in kids younger than 2 is considered off label and has not been formally studied  Burning and stinging are the most commonly reported side effects of this medication  Rarely, this product has been known to cause hives and hypersensitivity reactions; discontinue its use if you develop severe itching, swelling, or redness in the area of application  3) Moisturizers  a) Continue to apply Eucerin cream or Aquaphor ointment at least 3 times a day  It is best to use moisturizers and prescription medications at DIFFERENT times during the day (ideally, about 30 minutes apart)  If you must apply your prescription medication and your moisturizer at the same time, then ALWAYS apply the prescription medication FIRST (i e , directly to the skin); as we discussed, this allows the prescription medication to reach the skin without being blocked by the thick moisturizer! 4) Oral Antihistamines  Take Cetirizine (Zyrtec): Take 5 mL (1 teaspoon) of 5mg/5mL oral suspension through allergy season  EDUCATION AS INTERVENTION! WHAT IS ATOPIC DERMATITIS? Atopic dermatitis (also called eczema) is a condition of the skin where the skin is dry, red, and itchy  The main function of the skin is to provide a barrier from the environment and is also the first defense of the immune system  In atopic dermatitis the skin barrier is decreased or disrupted, and the skin is easily irritated  As a result, moisture escapes the skin more easily, and environmental allergens and microbes can enter the skin more easily  Consequently, the skin's immune system is altered  If there are increased allergic type cells in the skin, the skin may become red and hyper-excitable   This leads to itching and a subsequent rash  WHY DO PEOPLE GET ATOPIC DERMATITIS? There is no single answer because many factors are involved  It is likely a combination of genetic makeup and environmental triggers and/or exposures   Excessive drying or sweating of the skin, Irritating soaps, dust mites, and pet dander are some of the more common triggers  There is no blood test that can be done to confirm this diagnosis  The history and appearance of the skin is usually sufficient for a diagnosis  However, in some cases if the rash does not fit with the history or respond appropriately to treatment, a skin biopsy may be helpful  Many children do outgrow atopic dermatitis or get better; however, many continue to have sensitive skin into adulthood  Asthma and hay fever are often seen in many patients with atopic dermatitis; however, asthma flares do not necessarily occur at the same time as skin flares  PREVENTING FLARES OF ATOPIC DERMATITIS  The first step is to maintain the skin's barrier function  Keep the skin well moisturized  Avoid irritants and triggers  Use prescribed medicine when there are red or rough areas to help the skin to return to normal as quickly as possible  Try to limit scratching  If you keep the skin well moisturized, and avoid coming in contact with things you know irritate your child's skin, there will be less flares  However, some flares of atopic dermatitis are beyond your control  You should work with your health care provider to come up with a plan that minimizes flares while minimizing long term use of medications that suppress the immune system  WHAT ARE SOME OF THE TRIGGERS? Triggers are different for different people  The most common triggers are:   Heat and sweat for some individuals, cold weather for others   House dust mites, pet fur   Wool; synthetic fabrics like nylon; dyed fabrics   Tobacco smoke    Fragrances in: shampoos, soaps, lotions, laundry detergents, fabric softeners   Saliva or prolonged exposure to water  WHAT ABOUT FOOD ALLERGIES? This is a very controversial topic, as many believe that food allergies are responsible for skin flares   In some cases, specific foods may cause worsening of atopic dermatitis; however this occurs in a minority of cases and usually happens within a few hours of ingestion  While food allergy is more common in children with eczema, foods are specific triggers for flares in only a small percentage of children  If you notice that the skin flares after certain foods you can see if eliminating one food at time makes a difference, as long as your child can still enjoy a well-balanced diet  There are blood (RAST) and skin (PRICK) tests that can check for allergies, but they are often positive in children who are not truly allergic  Therefore it is important that you work with your allergist and dermatologist to determine which foods are relevant and causing true symptoms  Extreme food elimination diets without the guidance of your doctor, which have become more popular in recent years, may even result in worsening of the skin rash due to malnutrition and avoidance of essential nutrients  TREATMENT  Treatments are aimed at minimizing exposure to irritating factors and decreasing  the skin inflammation which results in an itchy rash  There are many different treatment options, which depend on your child's rash, its location, and severity  Topical treatments include corticosteroids and steroid-like creams such as Protopic, Elidel, and Eucrisa, which are believed to not thin the skin  Please read the discussions below regarding risks and benefits of all of these creams  Occasionally bacterial or viral infections can occur which flare the skin and require oral and/or topical antibiotics or antivirals  In some cases bleach baths 2-3 times weekly can be helpful to prevent recurrent infection  For severe disease, strong oral medications such as corticosteroids, methotrexate or azathioprine (Imuran) may be needed  These medications require close monitoring and follow-up  You should discuss the risks/ benefits/alternatives of these medications with your health care provider to come up with the best treatment plan for your child      1) Use moisturizer all over the entire body at least THREE TIMES a day  This keeps the skin moisturized to restore the barrier function  Find a cream or ointment that your child likes - this is the most important  The medicines do not work in the bottle  The thicker the moisturizer, generally the better barrier it provides  Ointments often moisturize better than creams; and creams work better than lotions  Lotions are more useful during the summer when thick greasy ointments are uncomfortable  If you put moisturizer on the skin after bathing, while the skin is damp, it is twice as effective  The moisturizer provides a seal holding the water in the skin  You may bathe your child in warm - not hot - water, for short periods of time (no more than 5-10 minutes at a time) once a day if they like  Lightly pat your child dry with a towel and, while the skin is still damp, (within 3 minutes) apply a moisturizer from head to toe  If your child is using a medicated cream, apply it and allow it to absorb completely BEFORE you apply the moisturizer  2) Apply the prescription medication TWICE A DAY to only the red, rough areas on the skin OR AS [place]  Put the medication on your fingers and gently rub it into the areas  Usually the medicine will help an area within a few days time  Try to put the medicine on for two days after you have noticed that the redness is no longer present; this will help the redness from returning  The severity of the rash and the strength and usage of the medication will determine how quickly you see improvement  It is important that you do not overuse steroid creams, and if you notice a thin, shiny appearance to the skin or broken blood vessels, you should stop using the cream and consult your health care provider regarding possible overuse/overthinning of the skin    The face, armpits and groin have particularly thin and sensitive skin and are therefore most at risk for bad results if steroids are over-used in these sites  3) Avoid triggers  Some children have specific things that trigger itching and rashes, while others may have none that can be identified  It may require a little bit of trial and error to see what applies to your child  Also, triggers can change over time for your child  The most common triggers are listed above; start with these  Avoid the use of fabric softeners in the washing machine or dryer sheets (unless they are fragrance-free)  Try to use laundry detergents, soaps and shampoos that are fragrance-free  You may find it helpful to double-rinse your clothes  Some children are sensitive to house dust mites and they may benefit from a plastic mattress wrap  While food allergy is more common in children with eczema, foods are specific triggers for flares in only a small percentage of children  If you notice that the skin flares after certain foods you can see if eliminating one food at time makes a difference, as long as your child can still enjoy a well-balanced diet  4) Consider using a medication like an anti-histamine by mouth to help control the itching  Scratching only makes the skin more reactive and the barrier function even more disrupted  It can cause both children and their parents to lose sleep! There are different types of anti-itch medications  Some cause more drowsiness than others  Both types are acceptable depending on your child and your preference  Start with Benadryl and if that does not work, ask for a prescription antihistamine      5) About the prescription creams:  Corticosteroid creams and ointments (generally things with "-one" or "sherly" on the end of their names): The strength of the cream or ointment depends on the name of the active ingredient  The numbers at the end do not indicate the relative strength    Thus triamcinolone 0 1% ointment, considered a mid-strength corticosteroid, is much stronger than hydrocortisone 1% even though the number following the name is much lower  Topical corticosteroids are very effective in treating atopic dermatitis  When used in the manner prescribed (to rashy areas of skin and for no more than a few weeks at a time to any one area) they are very safe  These are corticosteroids and are anti-inflammatory, not the anabolic steroids like those used illegally by some athletes  Topical non-steroid creams and ointments (immunomodulators): These creams and ointments are also called topical calcineurin inhibitors (TCIs)  These include Protopic ointment and Elidel cream  Crisaborole 2% Erin Elysia) is a prescription ointment that targets an enzyme called PDE4 (phosphodiesterase 4)  It is used on the skin topically to treat mild-to-moderate eczema in adults and children 3years of age and older  In total, these nonsteroidal prescriptions are used to help decrease itching and redness in the skin  They are not as strong as most steroid creams; however, it is believed that they do not thin the skin when overused  They are generally used as second-line medications, though they may be used alone or in conjunction with topical steroids  In sensitive areas such as the face, underarms or groin, they are often recommended  They can sting inflamed skin, but are generally well tolerated once the skin is healing  The FDA placed a black-box warning on both Elidel and Protopic in 2006 based on animal studies using the medications  Some animals developed skin cancer and lymphoma  Subsequently, the FDA released a statement that there is no causal relationship between the two medications and cancer  Because of this concern, there are ongoing studies to evaluate this relationship in humans  So far, there are studies that support the safety of these medications    One showed that the rates of cancer in patient using these medications topically were less than the rates of the general population and another showed that in patient's using the medication over a large area of the body, the levels of the medication in the blood was undetectable  As for Eucrisa, this product is only approved for the topical treatment of mild-to-moderate eczema in patients 3years of age and older; use of the medication in kids younger than 2 is considered off label and has not been formally studied  Burning and stinging are the most commonly reported side effects of this medication  Rarely, this product has been known to cause hives and hypersensitivity reactions; discontinue its use if you develop severe itching, swelling, or redness in the area of application        Scribe Attestation    I,:   Evelio Simpson MA am acting as a scribe while in the presence of the attending physician :        I,:   Duran Hernandez MD personally performed the services described in this documentation    as scribed in my presence :

## 2019-09-03 NOTE — PATIENT INSTRUCTIONS
1  ATOPIC DERMATITIS     Physical Exam:   Anatomic Location Affected:  Legs and arms   Morphological Description:  Diffuse xerosis, atrophy popliteal space     Based on a thorough discussion of this condition and the management approach to it (including a comprehensive discussion of the known risks, side effects and potential benefits of treatment), the patient (family) agrees to implement the following specific plan:   Prednisone 5 mg daily   Valisone lotion twice a day   Protopic ointment twice a day   Continue Triamcinolone ointment twice daily     Assessment and Plan:   Atopic Dermatitis is a chronic, itchy skin condition that is very common in children but may occur at any age  It is also known as eczema or atopic eczema   It is the most common form of dermatitis  Atopic dermatitis usually occurs in people who have an atopic tendency    This means they may develop any or all of these closely linked conditions: Atopic dermatitis, asthma, hay fever (allergic rhinitis), eosinophilic esophagitis, and gastroenteritis  Often these conditions run within families with a parent, child or sibling also affected  A family history of asthma, eczema or hay fever is particularly useful in diagnosing atopic dermatitis in infants  Atopic dermatitis arises because of a complex interaction of genetic and environmental factors  These include defects in skin barrier function making the skin more susceptible to irritation by soap and other contact irritants, the weather, temperature and non-specific triggers  There is also an element of immune system dysregulation that is often present  By definition, it is chronic and has a "waxing-waning" nature; flares should be expected but with good education and treatment strategies can be minimized  Some specific tips we discussed:   Dry skin care     Usingonly mild cleansers (hypoallergenic and without fragrances) and fragrance free detergent (not unscented products which contain a masking agent); we discussed avoiding irritants/fragranced products   The importance of regular application of moisturizers daily (at least 3 times a day)   The known and theoretical side effects of steroids at length, including but not limited to atrophy of skin and increased pressure in eye (glaucoma) and clouding of the eye's lens (cataracts) if used in or around the eye for extended durations   The specific over-the-counter interventions and medications   Side effects, risks and benefits of topical and oral medications discussed   After lengthy discussion of etiology and treatment options, we decided to implement the following personalized treatment plan:      YOUR PERSONALIZED ECZEMA ACTION PLAN    FOR ACUTE FLARING    1) Antimicrobials  a) None    b) Clindamycin 75mg/5mL solution:  Take by mouth THREE TIMES A DAY for 10 days straight to help reduce the amount of presumed Staph aureus colonizing the skin  c) Bleach baths:  Soak in a bleach bath (recipe provided via email) about 3 times a week for about 5-10 minutes a day; crucially, make sure to rinse thoroughly with fresh water and apply moisturizer within 3 minutes of toweling off gently  2) Anti-Inflammatories  a) During periods of acute flaring, apply the Hydrocortisone 2 5% ointment to the face and neck TWICE A DAY for 2 weeks straight  To give you an idea of how much medication to use: You should be using at least a single full 30-gram tube of this medication EACH WEEK  b) During periods of acute flaring, apply the Triamcinolone 0 1% ointment to the body TWICE A DAY for 2 weeks straight  To give you an idea of how much medication to use: You should be using at least two full 30-gram tubes of this medication EACH WEEK  Do NOT apply this stronger medication to the face or groin area as the skin is too thin and at greater risk for side effects        3) Moisturizers  a) Apply Eucerin cream or Aquaphor ointment at least 3 times a day  It is best to use moisturizers and prescription medications at DIFFERENT times during the day (ideally, about 30 minutes apart)  If you must apply your prescription medication and your moisturizer at the same time, then ALWAYS apply the prescription medication FIRST (i e , directly to the skin); as we discussed, this allows the prescription medication to reach the skin without being blocked by the thick moisturizer! 4) Oral Antihistamines  a) Cetirizine (Zyrtec): Take 5 mL (1 teaspoon) of 5mg/5mL oral suspension EACH MORNING through allergy season  b) Hydroxyzine (Atarax): Take 5 mL (1 teaspoon) of 12 5mg/5mL oral solution about 1 hour before bedtime for the next 3-5 evenings to help reduce scratching  FOR CHRONIC MAINTENANCE    1) Antimicrobials  a) None    b) Bleach baths:  Soak in a bleach bath (recipe provided via email) about 3 times a week for about 5-10 minutes a day; crucially, make sure to rinse thoroughly with fresh water and apply moisturizer within 3 minutes of toweling off gently  2) Anti-Inflammatories  a) Once in the chronic maintenance phase apply Hydrocortisone 2 5% ointment to the face ONCE A DAY and only on Mondays, Wednesdays and Fridays to help decrease the inflammation; try to decrease to [facility] and Fridays if possible  b) Once in the chronic maintenance phase apply Triamcinolone 0 1% ointment to the body ONCE A DAY and only on Mondays, Wednesdays and Fridays to help decrease the inflammation; try to decrease to [facility] and Fridays if possible  Do NOT apply this stronger medication to your face or groin area as the skin is too thin and at greater risk for side effects  c) Apply crisaborole 2% Bell Rout) ointment TWICE A DAY on Tuesdays, Thursdays, Saturdays and Sundays (i e , the days you are not applying a topical steroid) to both the face/neck and body    As we discussed, this product is approved for the topical treatment of mild-to-moderate eczema in patients 3years of age and older; use of the medication in kids younger than 2 is considered off label and has not been formally studied  Burning and stinging are the most commonly reported side effects of this medication  Rarely, this product has been known to cause hives and hypersensitivity reactions; discontinue its use if you develop severe itching, swelling, or redness in the area of application  3) Moisturizers  a) Continue to apply Eucerin cream or Aquaphor ointment at least 3 times a day  It is best to use moisturizers and prescription medications at DIFFERENT times during the day (ideally, about 30 minutes apart)  If you must apply your prescription medication and your moisturizer at the same time, then ALWAYS apply the prescription medication FIRST (i e , directly to the skin); as we discussed, this allows the prescription medication to reach the skin without being blocked by the thick moisturizer! 4) Oral Antihistamines  Take Cetirizine (Zyrtec): Take 5 mL (1 teaspoon) of 5mg/5mL oral suspension through allergy season  EDUCATION AS INTERVENTION! WHAT IS ATOPIC DERMATITIS? Atopic dermatitis (also called eczema) is a condition of the skin where the skin is dry, red, and itchy  The main function of the skin is to provide a barrier from the environment and is also the first defense of the immune system  In atopic dermatitis the skin barrier is decreased or disrupted, and the skin is easily irritated  As a result, moisture escapes the skin more easily, and environmental allergens and microbes can enter the skin more easily  Consequently, the skin's immune system is altered  If there are increased allergic type cells in the skin, the skin may become red and hyper-excitable   This leads to itching and a subsequent rash  WHY DO PEOPLE GET ATOPIC DERMATITIS? There is no single answer because many factors are involved    It is likely a combination of genetic makeup and environmental triggers and/or exposures  Excessive drying or sweating of the skin, Irritating soaps, dust mites, and pet dander are some of the more common triggers  There is no blood test that can be done to confirm this diagnosis  The history and appearance of the skin is usually sufficient for a diagnosis  However, in some cases if the rash does not fit with the history or respond appropriately to treatment, a skin biopsy may be helpful  Many children do outgrow atopic dermatitis or get better; however, many continue to have sensitive skin into adulthood  Asthma and hay fever are often seen in many patients with atopic dermatitis; however, asthma flares do not necessarily occur at the same time as skin flares  PREVENTING FLARES OF ATOPIC DERMATITIS  The first step is to maintain the skin's barrier function  Keep the skin well moisturized  Avoid irritants and triggers  Use prescribed medicine when there are red or rough areas to help the skin to return to normal as quickly as possible  Try to limit scratching  If you keep the skin well moisturized, and avoid coming in contact with things you know irritate your child's skin, there will be less flares  However, some flares of atopic dermatitis are beyond your control  You should work with your health care provider to come up with a plan that minimizes flares while minimizing long term use of medications that suppress the immune system  WHAT ARE SOME OF THE TRIGGERS? Triggers are different for different people  The most common triggers are:   Heat and sweat for some individuals, cold weather for others   House dust mites, pet fur   Wool; synthetic fabrics like nylon; dyed fabrics   Tobacco smoke    Fragrances in: shampoos, soaps, lotions, laundry detergents, fabric softeners   Saliva or prolonged exposure to water  WHAT ABOUT FOOD ALLERGIES?   This is a very controversial topic, as many believe that food allergies are responsible for skin flares  In some cases, specific foods may cause worsening of atopic dermatitis; however this occurs in a minority of cases and usually happens within a few hours of ingestion  While food allergy is more common in children with eczema, foods are specific triggers for flares in only a small percentage of children  If you notice that the skin flares after certain foods you can see if eliminating one food at time makes a difference, as long as your child can still enjoy a well-balanced diet  There are blood (RAST) and skin (PRICK) tests that can check for allergies, but they are often positive in children who are not truly allergic  Therefore it is important that you work with your allergist and dermatologist to determine which foods are relevant and causing true symptoms  Extreme food elimination diets without the guidance of your doctor, which have become more popular in recent years, may even result in worsening of the skin rash due to malnutrition and avoidance of essential nutrients  TREATMENT  Treatments are aimed at minimizing exposure to irritating factors and decreasing  the skin inflammation which results in an itchy rash  There are many different treatment options, which depend on your child's rash, its location, and severity  Topical treatments include corticosteroids and steroid-like creams such as Protopic, Elidel, and Eucrisa, which are believed to not thin the skin  Please read the discussions below regarding risks and benefits of all of these creams  Occasionally bacterial or viral infections can occur which flare the skin and require oral and/or topical antibiotics or antivirals  In some cases bleach baths 2-3 times weekly can be helpful to prevent recurrent infection  For severe disease, strong oral medications such as corticosteroids, methotrexate or azathioprine (Imuran) may be needed  These medications require close monitoring and follow-up   You should discuss the risks/ benefits/alternatives of these medications with your health care provider to come up with the best treatment plan for your child  1) Use moisturizer all over the entire body at least THREE TIMES a day  This keeps the skin moisturized to restore the barrier function  Find a cream or ointment that your child likes - this is the most important  The medicines do not work in the bottle  The thicker the moisturizer, generally the better barrier it provides  Ointments often moisturize better than creams; and creams work better than lotions  Lotions are more useful during the summer when thick greasy ointments are uncomfortable  If you put moisturizer on the skin after bathing, while the skin is damp, it is twice as effective  The moisturizer provides a seal holding the water in the skin  You may bathe your child in warm - not hot - water, for short periods of time (no more than 5-10 minutes at a time) once a day if they like  Lightly pat your child dry with a towel and, while the skin is still damp, (within 3 minutes) apply a moisturizer from head to toe  If your child is using a medicated cream, apply it and allow it to absorb completely BEFORE you apply the moisturizer  2) Apply the prescription medication TWICE A DAY to only the red, rough areas on the skin OR [place]  Put the medication on your fingers and gently rub it into the areas  Usually the medicine will help an area within a few days time  Try to put the medicine on for two days after you have noticed that the redness is no longer present; this will help the redness from returning  The severity of the rash and the strength and usage of the medication will determine how quickly you see improvement      It is important that you do not overuse steroid creams, and if you notice a thin, shiny appearance to the skin or broken blood vessels, you should stop using the cream and consult your health care provider regarding possible overuse/overthinning of the skin  The face, armpits and groin have particularly thin and sensitive skin and are therefore most at risk for bad results if steroids are over-used in these sites  3) Avoid triggers  Some children have specific things that trigger itching and rashes, while others may have none that can be identified  It may require a little bit of trial and error to see what applies to your child  Also, triggers can change over time for your child  The most common triggers are listed above; start with these  Avoid the use of fabric softeners in the washing machine or dryer sheets (unless they are fragrance-free)  Try to use laundry detergents, soaps and shampoos that are fragrance-free  You may find it helpful to double-rinse your clothes  Some children are sensitive to house dust mites and they may benefit from a plastic mattress wrap  While food allergy is more common in children with eczema, foods are specific triggers for flares in only a small percentage of children  If you notice that the skin flares after certain foods you can see if eliminating one food at time makes a difference, as long as your child can still enjoy a well-balanced diet  4) Consider using a medication like an anti-histamine by mouth to help control the itching  Scratching only makes the skin more reactive and the barrier function even more disrupted  It can cause both children and their parents to lose sleep! There are different types of anti-itch medications  Some cause more drowsiness than others  Both types are acceptable depending on your child and your preference  Start with Benadryl and if that does not work, ask for a prescription antihistamine      5) About the prescription creams:  Corticosteroid creams and ointments (generally things with "-one" or "sherly" on the end of their names):   The strength of the cream or ointment depends on the name of the active ingredient  The numbers at the end do not indicate the relative strength  Thus triamcinolone 0 1% ointment, considered a mid-strength corticosteroid, is much stronger than hydrocortisone 1% even though the number following the name is much lower  Topical corticosteroids are very effective in treating atopic dermatitis  When used in the manner prescribed (to rashy areas of skin and for no more than a few weeks at a time to any one area) they are very safe  These are corticosteroids and are anti-inflammatory, not the anabolic steroids like those used illegally by some athletes  Topical non-steroid creams and ointments (immunomodulators): These creams and ointments are also called topical calcineurin inhibitors (TCIs)  These include Protopic ointment and Elidel cream  Crisaborole 2% Jannet Carpenter) is a prescription ointment that targets an enzyme called PDE4 (phosphodiesterase 4)  It is used on the skin topically to treat mild-to-moderate eczema in adults and children 3years of age and older  In total, these nonsteroidal prescriptions are used to help decrease itching and redness in the skin  They are not as strong as most steroid creams; however, it is believed that they do not thin the skin when overused  They are generally used as second-line medications, though they may be used alone or in conjunction with topical steroids  In sensitive areas such as the face, underarms or groin, they are often recommended  They can sting inflamed skin, but are generally well tolerated once the skin is healing  The FDA placed a black-box warning on both Elidel and Protopic in 2006 based on animal studies using the medications  Some animals developed skin cancer and lymphoma  Subsequently, the FDA released a statement that there is no causal relationship between the two medications and cancer  Because of this concern, there are ongoing studies to evaluate this relationship in humans    So far, there are studies that support the safety of these medications  One showed that the rates of cancer in patient using these medications topically were less than the rates of the general population and another showed that in patient's using the medication over a large area of the body, the levels of the medication in the blood was undetectable  As for Eucrisa, this product is only approved for the topical treatment of mild-to-moderate eczema in patients 3years of age and older; use of the medication in kids younger than 2 is considered off label and has not been formally studied  Burning and stinging are the most commonly reported side effects of this medication  Rarely, this product has been known to cause hives and hypersensitivity reactions; discontinue its use if you develop severe itching, swelling, or redness in the area of application

## 2019-10-22 ENCOUNTER — TELEPHONE (OUTPATIENT)
Dept: ENDOCRINOLOGY | Facility: CLINIC | Age: 74
End: 2019-10-22

## 2019-10-22 DIAGNOSIS — L20.9 ATOPIC DERMATITIS, UNSPECIFIED TYPE: ICD-10-CM

## 2019-10-22 RX ORDER — PREDNISONE 1 MG/1
TABLET ORAL
Qty: 45 TABLET | Refills: 0 | Status: SHIPPED | OUTPATIENT
Start: 2019-10-22 | End: 2019-11-01 | Stop reason: SDUPTHER

## 2019-10-22 NOTE — TELEPHONE ENCOUNTER
----- Message from Edgardo Nayak sent at 10/21/2019 11:04 AM EDT -----  Regarding: RE: RE: RE: Non-Urgent Medical Question  Contact: [phone]    I need a refill of prednisone   thank you  ----- Message -----  From: Abraham Rios MD  Sent: 9/12/19 12:01 PM  To: Edgardo Nayak  Subject: RE: RE: Non-Urgent Medical Question    Because you do not have an appetite, I think it would be reasonable to increase the prednisone to 7 5 mg  If in a month you're still having difficulty with appetite, I would recommend seeing a gastroenterologist to make sure there is nothing else causing the poor appetite  Allen MD      ----- Message -----     From: Edgardo Nayak     Sent: 9/12/2019 10:32 AM EDT       To: Abraham Rios MD  Subject: RE: RE: Non-Urgent Medical Question    Both actually  My skin is about 80% clear  I have no appetite, I force myself to eat  I just felt better before   Now I don't feel that good  ----- Message -----  From: Abraham Rios MD  Sent: 9/12/19 8:37 AM  To: Edgardo Nayak  Subject: RE: Non-Urgent Medical Question    When use a that it does not work great, do you mean for the adrenal insufficiency or for your skin condition? Please give me some more details if you mean for the adrenal insufficiency  Abraham Rios MD      ----- Message -----     From: Edgardo Nayak     Sent: 9/11/2019  2:43 PM EDT       To: Abraham Rios MD  Subject: Non-Urgent Medical Question    I have been on 5mg of prednisone for a while  I works but not great  10 mg works great but I think I should try 7 5 mg and see how that works for me    Demetrius Childers thank you

## 2019-10-22 NOTE — TELEPHONE ENCOUNTER
Do you want to refill the Prednisone at this time or place a referral for GI? It seems it has already been a month since the increased dose

## 2019-11-01 ENCOUNTER — OFFICE VISIT (OUTPATIENT)
Dept: ENDOCRINOLOGY | Facility: CLINIC | Age: 74
End: 2019-11-01
Payer: COMMERCIAL

## 2019-11-01 VITALS
BODY MASS INDEX: 20.99 KG/M2 | WEIGHT: 134 LBS | HEART RATE: 64 BPM | DIASTOLIC BLOOD PRESSURE: 80 MMHG | SYSTOLIC BLOOD PRESSURE: 148 MMHG

## 2019-11-01 DIAGNOSIS — L20.9 ATOPIC DERMATITIS, UNSPECIFIED TYPE: ICD-10-CM

## 2019-11-01 DIAGNOSIS — E27.40 ADRENAL INSUFFICIENCY (HCC): Primary | ICD-10-CM

## 2019-11-01 PROCEDURE — 99213 OFFICE O/P EST LOW 20 MIN: CPT | Performed by: INTERNAL MEDICINE

## 2019-11-01 RX ORDER — PREDNISONE 1 MG/1
TABLET ORAL
Qty: 135 TABLET | Refills: 3 | Status: SHIPPED | OUTPATIENT
Start: 2019-11-01 | End: 2020-05-04 | Stop reason: SDUPTHER

## 2019-11-01 NOTE — PROGRESS NOTES
Assessment/Plan:    Adrenal insufficiency (HCC)  His appetite has improved with increase in prednisone  Continue current regimen  I did give him a Flyer for medic alert bracelet which I encouraged him to get  I also spoke to him about doubling the dose of his steroid if he is ill  In addition, I offered an injectable steroid if he is unable to keep his prednisone down  At this time, he feels that this is not necessary  Atopic dermatitis  Management per Dermatology  Diagnoses and all orders for this visit:    Adrenal insufficiency ([address] )  -     Comprehensive metabolic panel Lab Collect; Future  -     Lipid panel Lab Collect Lab Collect; Future  -     T4, free Lab Collect; Future  -     TSH, 3rd generation Lab Collect; Future  -     Ambulatory referral to [facility]; Future    Atopic dermatitis, unspecified type  -     predniSONE 5 mg tablet; Take 1 5 tablets (7 5 mg) by mouth daily          Subjective:      Patient ID: Rafaela Valentino is a 68 y o  male  78-year-old male with atopic dermatitis for which he is on the oral steroids as well as multiple creams  He had called me due to anorexia at which time his prednisone was increased to 7 5 mg per day  He states that the anorexia has improved  He denies any nausea or vomiting  We did discuss using biologic agents for his atopic dermatitis but he is scared of those  The following portions of the patient's history were reviewed and updated as appropriate: allergies, current medications, past family history, past medical history, past social history, past surgical history and problem list     Review of Systems   Constitutional: Negative for chills and fever  Respiratory: Negative for shortness of breath  Cardiovascular: Negative for chest pain  Gastrointestinal: Negative for constipation, diarrhea, nausea and vomiting  All other systems reviewed and are negative          Objective:      /80   Pulse 64   Wt 60 8 kg (134 lb) BMI 20 99 kg/m²          Physical Exam   Constitutional: He is oriented to person, place, and time  He appears well-developed and well-nourished  No distress  HENT:   Head: Normocephalic and atraumatic  Mouth/Throat: Oropharynx is clear and moist and mucous membranes are normal  No oropharyngeal exudate  Eyes: Conjunctivae, EOM and lids are normal  Right eye exhibits no discharge  Left eye exhibits no discharge  No scleral icterus  Neck: Neck supple  No thyromegaly present  Cardiovascular: Normal rate, regular rhythm and normal heart sounds  Exam reveals no gallop and no friction rub  No murmur heard  Pulmonary/Chest: Effort normal and breath sounds normal  No respiratory distress  He has no wheezes  Abdominal: Soft  Bowel sounds are normal  He exhibits no distension  There is no tenderness  Musculoskeletal: Normal range of motion  He exhibits no edema, tenderness or deformity  Lymphadenopathy:        Head (right side): No occipital adenopathy present  Head (left side): No occipital adenopathy present  Right: No supraclavicular adenopathy present  Left: No supraclavicular adenopathy present  Neurological: He is alert and oriented to person, place, and time  No cranial nerve deficit  Coordination normal    Skin: Skin is warm and intact  No rash noted  He is not diaphoretic  No erythema  Psychiatric: He has a normal mood and affect  Vitals reviewed

## 2019-11-01 NOTE — ASSESSMENT & PLAN NOTE
His appetite has improved with increase in prednisone  Continue current regimen  I did give him a Flyer for medic alert bracelet which I encouraged him to get  I also spoke to him about doubling the dose of his steroid if he is ill  In addition, I offered an injectable steroid if he is unable to keep his prednisone down  At this time, he feels that this is not necessary

## 2019-11-27 ENCOUNTER — OFFICE VISIT (OUTPATIENT)
Dept: DERMATOLOGY | Facility: CLINIC | Age: 74
End: 2019-11-27
Payer: COMMERCIAL

## 2019-11-27 VITALS — HEIGHT: 67 IN | WEIGHT: 136 LBS | TEMPERATURE: 98.8 F | BODY MASS INDEX: 21.35 KG/M2

## 2019-11-27 DIAGNOSIS — L20.9 ATOPIC DERMATITIS, UNSPECIFIED TYPE: Primary | ICD-10-CM

## 2019-11-27 PROCEDURE — 99213 OFFICE O/P EST LOW 20 MIN: CPT | Performed by: DERMATOLOGY

## 2019-11-27 NOTE — PROGRESS NOTES
[facility] Follow Up Note    Patient Name: Maribeth Samson  Encounter Date: 11/27/2019    Today's Chief Concerns:  Agustin Bones Concern #1:  Follow up atopic dermatitis arms and legs    Current Medications:    Current Outpatient Medications:     Ascorbic Acid (VITAMIN C) 100 MG tablet, Take 250 mg by mouth, Disp: , Rfl:     aspirin (ASPIRIN 81) 81 mg EC tablet, Take 81 mg by mouth daily, Disp: , Rfl:     atorvastatin (LIPITOR) 80 mg tablet, Take 80 mg by mouth daily, Disp: , Rfl:     betamethasone valerate (VALISONE) 0 1 % lotion, Apply topically twice daily to affected areas, Disp: 60 mL, Rfl: 3    carvedilol (COREG) 12 5 mg tablet, Take 12 5 mg by mouth, Disp: , Rfl:     clopidogrel (PLAVIX) 75 mg tablet, TAKE 1 TABLET BY MOUTH DAILY, Disp: , Rfl:     ferrous sulfate 325 (65 Fe) mg tablet, Take 325 mg by mouth, Disp: , Rfl:     hydrALAZINE (APRESOLINE) 25 mg tablet, Take 25 mg by mouth 2 (two) times a day, Disp: , Rfl:     mirtazapine (REMERON) 30 mg tablet, Take 30 mg by mouth, Disp: , Rfl:     morphine (MS CONTIN) 15 mg 12 hr tablet, Take 15 mg by mouth 2 (two) times a day, Disp: , Rfl:     nitroglycerin (NITROSTAT) 0 4 mg SL tablet, Place 0 4 mg under the tongue, Disp: , Rfl:     oxyCODONE (ROXICODONE) 15 mg immediate release tablet, Take 15 mg by mouth every 6 (six) hours as needed, Disp: , Rfl:     predniSONE 5 mg tablet, Take 1 5 tablets (7 5 mg) by mouth daily, Disp: 135 tablet, Rfl: 3    tacrolimus (PROTOPIC) 0 1 % ointment, Apply topically twice daily to affected areas, Disp: 100 g, Rfl: 3    triamcinolone (KENALOG) 0 1 % ointment, Apply topically to affected areas twice a day, Disp: 453 6 g, Rfl: 6    Mirabegron ER (MYRBETRIQ) 50 MG TB24, Take 50 mg by mouth daily, Disp: , Rfl:     CONSTITUTIONAL:   Vitals:    11/27/19 1033   Temp: 98 8 °F (37 1 °C)   TempSrc: Tympanic   Weight: 61 7 kg (136 lb)   Height: 5' 7" (1 702 m)           Allergies   Allergen Reactions    Baclofen Sedation toxicity     Bupropion      Violent     Fentanyl Myalgia     "i lost control of my left arm and left leg"    Pregabalin      Weight gain, become manic         PSYCH: Normal mood and affect  EYES: Normal conjunctiva  ENT: Normal lips and oral mucosa  CARDIOVASCULAR: No edema  RESPIRATORY: Normal respirations  HEME/LYMPH/IMMUNO:  No regional lymphadenopathy except as noted below in ASSESSMENT AND PLAN BY DIAGNOSIS    FULL ORGAN SYSTEM SKIN EXAM (SKIN)  Hair, Scalp, Ears, Face Normal except as noted below in Assessment   Neck, Cervical Chain Nodes Normal except as noted below in Assessment   Right Arm/Hand/Fingers Normal except as noted below in Assessment   Left Arm/Hand/Fingers Normal except as noted below in Assessment                   Right Leg, Foot, Toes Normal except as noted below in Assessment   Left Leg, Foot, Toes Normal except as noted below in Assessment       1  ATOPIC DERMATITIS ("childhood Eczema")    Physical Exam:   Anatomic Location Affected:  Arms and legs   Morphological Description:  Mild xerosis, few excoriated patches, ectropian   Severity: mild   Pertinent Positives:   Pertinent Negatives:No regional lymphadenopathy     Additional History of Present Condition:  Rash is almost gone    Assessment and Plan:  Based on a thorough discussion of this condition and the management approach to it (including a comprehensive discussion of the known risks, side effects and potential benefits of treatment), the patient (family) agrees to implement the following specific plan:   Continue oral prednisone 7 5 mg, managed by Dr Bonnie English triamcinolone ointment to active spots only   Continue protopic ointment on persistent spots   Given the previous severity of his life long eczema he is doing well  My only request for him was to try to use protopic, a nonsteroid on active areas rather than topical corticosteroid    I note that dupixent is available if severe relapse but not currently indicated and authorization would be challenging  Assessment and Plan:   Atopic Dermatitis is a chronic, itchy skin condition that is very common in children but may occur at any age  It is also known as eczema or atopic eczema   It is the most common form of dermatitis  Atopic dermatitis usually occurs in people who have an atopic tendency    This means they may develop any or all of these closely linked conditions: Atopic dermatitis, asthma, hay fever (allergic rhinitis), eosinophilic esophagitis, and gastroenteritis  Often these conditions run within families with a parent, child or sibling also affected  A family history of asthma, eczema or hay fever is particularly useful in diagnosing atopic dermatitis in infants  Atopic dermatitis arises because of a complex interaction of genetic and environmental factors  These include defects in skin barrier function making the skin more susceptible to irritation by soap and other contact irritants, the weather, temperature and non-specific triggers  There is also an element of immune system dysregulation that is often present  By definition, it is chronic and has a "waxing-waning" nature; flares should be expected but with good education and treatment strategies can be minimized  Some specific tips we discussed:   Dry skin care   Usingonly mild cleansers (hypoallergenic and without fragrances) and fragrance free detergent (not unscented products which contain a masking agent); we discussed avoiding irritants/fragranced products   The importance of regular application of moisturizers daily (at least 3 times a day)   The known and theoretical side effects of steroids at length, including but not limited to atrophy of skin and increased pressure in eye (glaucoma) and clouding of the eye's lens (cataracts) if used in or around the eye for extended durations   The specific over-the-counter interventions and medications     Side effects, risks and benefits of topical and oral medications discussed   After lengthy discussion of etiology and treatment options, we decided to implement the following personalized treatment plan:      YOUR PERSONALIZED ECZEMA ACTION PLAN    FOR ACUTE FLARING    1) Antimicrobials  a) None    b) Clindamycin 75mg/5mL solution:  Take by mouth THREE TIMES A DAY for 10 days straight to help reduce the amount of presumed Staph aureus colonizing the skin  c) Bleach baths:  Soak in a bleach bath (recipe provided via email) about 3 times a week for about 5-10 minutes a day; crucially, make sure to rinse thoroughly with fresh water and apply moisturizer within 3 minutes of toweling off gently  2) Anti-Inflammatories  a) During periods of acute flaring, apply the Hydrocortisone 2 5% ointment to the face and neck TWICE A DAY for 2 weeks straight  To give you an idea of how much medication to use: You should be using at least a single full 30-gram tube of this medication EACH WEEK  b) During periods of acute flaring, apply the Triamcinolone 0 1% ointment to the body TWICE A DAY for 2 weeks straight  To give you an idea of how much medication to use: You should be using at least two full 30-gram tubes of this medication EACH WEEK  Do NOT apply this stronger medication to the face or groin area as the skin is too thin and at greater risk for side effects  3) Moisturizers  a) Apply Eucerin cream or Aquaphor ointment at least 3 times a day  It is best to use moisturizers and prescription medications at DIFFERENT times during the day (ideally, about 30 minutes apart)  If you must apply your prescription medication and your moisturizer at the same time, then ALWAYS apply the prescription medication FIRST (i e , directly to the skin); as we discussed, this allows the prescription medication to reach the skin without being blocked by the thick moisturizer!     4) Oral Antihistamines  a) Cetirizine (Zyrtec): Take 5 mL (1 teaspoon) of 5mg/5mL oral suspension EACH MORNING through allergy season  b) Hydroxyzine (Atarax): Take 5 mL (1 teaspoon) of 12 5mg/5mL oral solution about 1 hour before bedtime for the next 3-5 evenings to help reduce scratching  FOR CHRONIC MAINTENANCE    1) Antimicrobials  a) None    b) Bleach baths:  Soak in a bleach bath (recipe provided via email) about 3 times a week for about 5-10 minutes a day; crucially, make sure to rinse thoroughly with fresh water and apply moisturizer within 3 minutes of toweling off gently  2) Anti-Inflammatories  a) Once in the chronic maintenance phase apply Hydrocortisone 2 5% ointment to the face ONCE A DAY and only on Mondays, Wednesdays and Fridays to help decrease the inflammation; try to decrease to [facility] and Fridays if possible  b) Once in the chronic maintenance phase apply Triamcinolone 0 1% ointment to the body ONCE A DAY and only on Mondays, Wednesdays and Fridays to help decrease the inflammation; try to decrease to [facility] and Fridays if possible  Do NOT apply this stronger medication to your face or groin area as the skin is too thin and at greater risk for side effects  c) Apply crisaborole 2% Quinn Emily) ointment TWICE A DAY on Tuesdays, Thursdays, Saturdays and Sundays (i e , the days you are not applying a topical steroid) to both the face/neck and body  As we discussed, this product is approved for the topical treatment of mild-to-moderate eczema in patients 3years of age and older; use of the medication in kids younger than 2 is considered off label and has not been formally studied  Burning and stinging are the most commonly reported side effects of this medication  Rarely, this product has been known to cause hives and hypersensitivity reactions; discontinue its use if you develop severe itching, swelling, or redness in the area of application      3) Moisturizers  a) Continue to apply Eucerin cream or Aquaphor ointment at least 3 times a day  It is best to use moisturizers and prescription medications at DIFFERENT times during the day (ideally, about 30 minutes apart)  If you must apply your prescription medication and your moisturizer at the same time, then ALWAYS apply the prescription medication FIRST (i e , directly to the skin); as we discussed, this allows the prescription medication to reach the skin without being blocked by the thick moisturizer! 4) Oral Antihistamines  Take Cetirizine (Zyrtec): Take 5 mL (1 teaspoon) of 5mg/5mL oral suspension through allergy season  EDUCATION AS INTERVENTION! WHAT IS ATOPIC DERMATITIS? Atopic dermatitis (also called eczema) is a condition of the skin where the skin is dry, red, and itchy  The main function of the skin is to provide a barrier from the environment and is also the first defense of the immune system  In atopic dermatitis the skin barrier is decreased or disrupted, and the skin is easily irritated  As a result, moisture escapes the skin more easily, and environmental allergens and microbes can enter the skin more easily  Consequently, the skin's immune system is altered  If there are increased allergic type cells in the skin, the skin may become red and hyper-excitable   This leads to itching and a subsequent rash  WHY DO PEOPLE GET ATOPIC DERMATITIS? There is no single answer because many factors are involved  It is likely a combination of genetic makeup and environmental triggers and/or exposures  Excessive drying or sweating of the skin, Irritating soaps, dust mites, and pet dander are some of the more common triggers  There is no blood test that can be done to confirm this diagnosis  The history and appearance of the skin is usually sufficient for a diagnosis  However, in some cases if the rash does not fit with the history or respond appropriately to treatment, a skin biopsy may be helpful    Many children do outgrow atopic dermatitis or get better; however, many continue to have sensitive skin into adulthood  Asthma and hay fever are often seen in many patients with atopic dermatitis; however, asthma flares do not necessarily occur at the same time as skin flares  PREVENTING FLARES OF ATOPIC DERMATITIS  The first step is to maintain the skin's barrier function  Keep the skin well moisturized  Avoid irritants and triggers  Use prescribed medicine when there are red or rough areas to help the skin to return to normal as quickly as possible  Try to limit scratching  If you keep the skin well moisturized, and avoid coming in contact with things you know irritate your child's skin, there will be less flares  However, some flares of atopic dermatitis are beyond your control  You should work with your health care provider to come up with a plan that minimizes flares while minimizing long term use of medications that suppress the immune system  WHAT ARE SOME OF THE TRIGGERS? Triggers are different for different people  The most common triggers are:   Heat and sweat for some individuals, cold weather for others   House dust mites, pet fur   Wool; synthetic fabrics like nylon; dyed fabrics   Tobacco smoke    Fragrances in: shampoos, soaps, lotions, laundry detergents, fabric softeners   Saliva or prolonged exposure to water  WHAT ABOUT FOOD ALLERGIES? This is a very controversial topic, as many believe that food allergies are responsible for skin flares  In some cases, specific foods may cause worsening of atopic dermatitis; however this occurs in a minority of cases and usually happens within a few hours of ingestion  While food allergy is more common in children with eczema, foods are specific triggers for flares in only a small percentage of children    If you notice that the skin flares after certain foods you can see if eliminating one food at time makes a difference, as long as your child can still enjoy a well-balanced diet  There are blood (RAST) and skin (PRICK) tests that can check for allergies, but they are often positive in children who are not truly allergic  Therefore it is important that you work with your allergist and dermatologist to determine which foods are relevant and causing true symptoms  Extreme food elimination diets without the guidance of your doctor, which have become more popular in recent years, may even result in worsening of the skin rash due to malnutrition and avoidance of essential nutrients  TREATMENT  Treatments are aimed at minimizing exposure to irritating factors and decreasing  the skin inflammation which results in an itchy rash  There are many different treatment options, which depend on your child's rash, its location, and severity  Topical treatments include corticosteroids and steroid-like creams such as Protopic, Elidel, and Eucrisa, which are believed to not thin the skin  Please read the discussions below regarding risks and benefits of all of these creams  Occasionally bacterial or viral infections can occur which flare the skin and require oral and/or topical antibiotics or antivirals  In some cases bleach baths 2-3 times weekly can be helpful to prevent recurrent infection  For severe disease, strong oral medications such as corticosteroids, methotrexate or azathioprine (Imuran) may be needed  These medications require close monitoring and follow-up  You should discuss the risks/ benefits/alternatives of these medications with your health care provider to come up with the best treatment plan for your child  1) Use moisturizer all over the entire body at least THREE TIMES a day  This keeps the skin moisturized to restore the barrier function  Find a cream or ointment that your child likes - this is the most important  The medicines do not work in the bottle  The thicker the moisturizer, generally the better barrier it provides    Ointments often moisturize better than creams; and creams work better than lotions  Lotions are more useful during the summer when thick greasy ointments are uncomfortable  If you put moisturizer on the skin after bathing, while the skin is damp, it is twice as effective  The moisturizer provides a seal holding the water in the skin  You may bathe your child in warm - not hot - water, for short periods of time (no more than 5-10 minutes at a time) once a day if they like  Lightly pat your child dry with a towel and, while the skin is still damp, (within 3 minutes) apply a moisturizer from head to toe  If your child is using a medicated cream, apply it and allow it to absorb completely BEFORE you apply the moisturizer  2) Apply the prescription medication TWICE A DAY to only the red, rough areas on the skin OR AS [place]  Put the medication on your fingers and gently rub it into the areas  Usually the medicine will help an area within a few days time  Try to put the medicine on for two days after you have noticed that the redness is no longer present; this will help the redness from returning  The severity of the rash and the strength and usage of the medication will determine how quickly you see improvement  It is important that you do not overuse steroid creams, and if you notice a thin, shiny appearance to the skin or broken blood vessels, you should stop using the cream and consult your health care provider regarding possible overuse/overthinning of the skin  The face, armpits and groin have particularly thin and sensitive skin and are therefore most at risk for bad results if steroids are over-used in these sites  3) Avoid triggers  Some children have specific things that trigger itching and rashes, while others may have none that can be identified  It may require a little bit of trial and error to see what applies to your child    Also, triggers can change over time for your child  The most common triggers are listed above; start with these  Avoid the use of fabric softeners in the washing machine or dryer sheets (unless they are fragrance-free)  Try to use laundry detergents, soaps and shampoos that are fragrance-free  You may find it helpful to double-rinse your clothes  Some children are sensitive to house dust mites and they may benefit from a plastic mattress wrap  While food allergy is more common in children with eczema, foods are specific triggers for flares in only a small percentage of children  If you notice that the skin flares after certain foods you can see if eliminating one food at time makes a difference, as long as your child can still enjoy a well-balanced diet  4) Consider using a medication like an anti-histamine by mouth to help control the itching  Scratching only makes the skin more reactive and the barrier function even more disrupted  It can cause both children and their parents to lose sleep! There are different types of anti-itch medications  Some cause more drowsiness than others  Both types are acceptable depending on your child and your preference  Start with Benadryl and if that does not work, ask for a prescription antihistamine      5) About the prescription creams:  Corticosteroid creams and ointments (generally things with "-one" or "sherly" on the end of their names): The strength of the cream or ointment depends on the name of the active ingredient  The numbers at the end do not indicate the relative strength  Thus triamcinolone 0 1% ointment, considered a mid-strength corticosteroid, is much stronger than hydrocortisone 1% even though the number following the name is much lower  Topical corticosteroids are very effective in treating atopic dermatitis  When used in the manner prescribed (to rashy areas of skin and for no more than a few weeks at a time to any one area) they are very safe    These are corticosteroids and are anti-inflammatory, not the anabolic steroids like those used illegally by some athletes  Topical non-steroid creams and ointments (immunomodulators): These creams and ointments are also called topical calcineurin inhibitors (TCIs)  These include Protopic ointment and Elidel cream  Crisaborole 2% Theoplis Stage) is a prescription ointment that targets an enzyme called PDE4 (phosphodiesterase 4)  It is used on the skin topically to treat mild-to-moderate eczema in adults and children 3years of age and older  In total, these nonsteroidal prescriptions are used to help decrease itching and redness in the skin  They are not as strong as most steroid creams; however, it is believed that they do not thin the skin when overused  They are generally used as second-line medications, though they may be used alone or in conjunction with topical steroids  In sensitive areas such as the face, underarms or groin, they are often recommended  They can sting inflamed skin, but are generally well tolerated once the skin is healing  The FDA placed a black-box warning on both Elidel and Protopic in 2006 based on animal studies using the medications  Some animals developed skin cancer and lymphoma  Subsequently, the FDA released a statement that there is no causal relationship between the two medications and cancer  Because of this concern, there are ongoing studies to evaluate this relationship in humans  So far, there are studies that support the safety of these medications  One showed that the rates of cancer in patient using these medications topically were less than the rates of the general population and another showed that in patient's using the medication over a large area of the body, the levels of the medication in the blood was undetectable      As for Eucrisa, this product is only approved for the topical treatment of mild-to-moderate eczema in patients 3years of age and older; use of the medication in kids younger than 2 is considered off label and has not been formally studied  Burning and stinging are the most commonly reported side effects of this medication  Rarely, this product has been known to cause hives and hypersensitivity reactions; discontinue its use if you develop severe itching, swelling, or redness in the area of application      Scribe Attestation    I,:   Gladys Gaytan MA am acting as a scribe while in the presence of the attending physician :        I,:   Megan Gonzales MD personally performed the services described in this documentation    as scribed in my presence :

## 2019-11-27 NOTE — PATIENT INSTRUCTIONS
1  ATOPIC DERMATITIS ("childhood Eczema")    Physical Exam:   Anatomic Location Affected:  Arms and legs    Assessment and Plan:  Based on a thorough discussion of this condition and the management approach to it (including a comprehensive discussion of the known risks, side effects and potential benefits of treatment), the patient (family) agrees to implement the following specific plan:   Continue oral prednisone 7 5 mg, managed by Dr Neema Corona Continue triamcinolone ointment   Continue protopic ointment on persistent spots     Assessment and Plan:   Atopic Dermatitis is a chronic, itchy skin condition that is very common in children but may occur at any age  It is also known as eczema or atopic eczema   It is the most common form of dermatitis  Atopic dermatitis usually occurs in people who have an atopic tendency    This means they may develop any or all of these closely linked conditions: Atopic dermatitis, asthma, hay fever (allergic rhinitis), eosinophilic esophagitis, and gastroenteritis  Often these conditions run within families with a parent, child or sibling also affected  A family history of asthma, eczema or hay fever is particularly useful in diagnosing atopic dermatitis in infants  Atopic dermatitis arises because of a complex interaction of genetic and environmental factors  These include defects in skin barrier function making the skin more susceptible to irritation by soap and other contact irritants, the weather, temperature and non-specific triggers  There is also an element of immune system dysregulation that is often present  By definition, it is chronic and has a "waxing-waning" nature; flares should be expected but with good education and treatment strategies can be minimized  Some specific tips we discussed:   Dry skin care     Usingonly mild cleansers (hypoallergenic and without fragrances) and fragrance free detergent (not unscented products which contain a masking agent); we discussed avoiding irritants/fragranced products   The importance of regular application of moisturizers daily (at least 3 times a day)   The known and theoretical side effects of steroids at length, including but not limited to atrophy of skin and increased pressure in eye (glaucoma) and clouding of the eye's lens (cataracts) if used in or around the eye for extended durations   The specific over-the-counter interventions and medications   Side effects, risks and benefits of topical and oral medications discussed   After lengthy discussion of etiology and treatment options, we decided to implement the following personalized treatment plan:      YOUR PERSONALIZED ECZEMA ACTION PLAN    FOR ACUTE FLARING    1) Antimicrobials  a) None    b) Clindamycin 75mg/5mL solution:  Take by mouth THREE TIMES A DAY for 10 days straight to help reduce the amount of presumed Staph aureus colonizing the skin  c) Bleach baths:  Soak in a bleach bath (recipe provided via email) about 3 times a week for about 5-10 minutes a day; crucially, make sure to rinse thoroughly with fresh water and apply moisturizer within 3 minutes of toweling off gently  2) Anti-Inflammatories  a) During periods of acute flaring, apply the Hydrocortisone 2 5% ointment to the face and neck TWICE A DAY for 2 weeks straight  To give you an idea of how much medication to use: You should be using at least a single full 30-gram tube of this medication EACH WEEK  b) During periods of acute flaring, apply the Triamcinolone 0 1% ointment to the body TWICE A DAY for 2 weeks straight  To give you an idea of how much medication to use: You should be using at least two full 30-gram tubes of this medication EACH WEEK  Do NOT apply this stronger medication to the face or groin area as the skin is too thin and at greater risk for side effects        3) Moisturizers  a) Apply Eucerin cream or Aquaphor ointment at least 3 times a day  It is best to use moisturizers and prescription medications at DIFFERENT times during the day (ideally, about 30 minutes apart)  If you must apply your prescription medication and your moisturizer at the same time, then ALWAYS apply the prescription medication FIRST (i e , directly to the skin); as we discussed, this allows the prescription medication to reach the skin without being blocked by the thick moisturizer! 4) Oral Antihistamines  a) Cetirizine (Zyrtec): Take 5 mL (1 teaspoon) of 5mg/5mL oral suspension EACH MORNING through allergy season  b) Hydroxyzine (Atarax): Take 5 mL (1 teaspoon) of 12 5mg/5mL oral solution about 1 hour before bedtime for the next 3-5 evenings to help reduce scratching  FOR CHRONIC MAINTENANCE    1) Antimicrobials  a) None    b) Bleach baths:  Soak in a bleach bath (recipe provided via email) about 3 times a week for about 5-10 minutes a day; crucially, make sure to rinse thoroughly with fresh water and apply moisturizer within 3 minutes of toweling off gently  2) Anti-Inflammatories  a) Once in the chronic maintenance phase apply Hydrocortisone 2 5% ointment to the face ONCE A DAY and only on Mondays, Wednesdays and Fridays to help decrease the inflammation; try to decrease to [facility] and Fridays if possible  b) Once in the chronic maintenance phase apply Triamcinolone 0 1% ointment to the body ONCE A DAY and only on Mondays, Wednesdays and Fridays to help decrease the inflammation; try to decrease to [facility] and Fridays if possible  Do NOT apply this stronger medication to your face or groin area as the skin is too thin and at greater risk for side effects  c) Apply crisaborole 2% Electa Hilt) ointment TWICE A DAY on Tuesdays, Thursdays, Saturdays and Sundays (i e , the days you are not applying a topical steroid) to both the face/neck and body    As we discussed, this product is approved for the topical treatment of mild-to-moderate eczema in patients 3years of age and older; use of the medication in kids younger than 2 is considered off label and has not been formally studied  Burning and stinging are the most commonly reported side effects of this medication  Rarely, this product has been known to cause hives and hypersensitivity reactions; discontinue its use if you develop severe itching, swelling, or redness in the area of application  3) Moisturizers  a) Continue to apply Eucerin cream or Aquaphor ointment at least 3 times a day  It is best to use moisturizers and prescription medications at DIFFERENT times during the day (ideally, about 30 minutes apart)  If you must apply your prescription medication and your moisturizer at the same time, then ALWAYS apply the prescription medication FIRST (i e , directly to the skin); as we discussed, this allows the prescription medication to reach the skin without being blocked by the thick moisturizer! 4) Oral Antihistamines  Take Cetirizine (Zyrtec): Take 5 mL (1 teaspoon) of 5mg/5mL oral suspension through allergy season  EDUCATION AS INTERVENTION! WHAT IS ATOPIC DERMATITIS? Atopic dermatitis (also called eczema) is a condition of the skin where the skin is dry, red, and itchy  The main function of the skin is to provide a barrier from the environment and is also the first defense of the immune system  In atopic dermatitis the skin barrier is decreased or disrupted, and the skin is easily irritated  As a result, moisture escapes the skin more easily, and environmental allergens and microbes can enter the skin more easily  Consequently, the skin's immune system is altered  If there are increased allergic type cells in the skin, the skin may become red and hyper-excitable   This leads to itching and a subsequent rash  WHY DO PEOPLE GET ATOPIC DERMATITIS? There is no single answer because many factors are involved    It is likely a combination of genetic makeup and environmental triggers and/or exposures  Excessive drying or sweating of the skin, Irritating soaps, dust mites, and pet dander are some of the more common triggers  There is no blood test that can be done to confirm this diagnosis  The history and appearance of the skin is usually sufficient for a diagnosis  However, in some cases if the rash does not fit with the history or respond appropriately to treatment, a skin biopsy may be helpful  Many children do outgrow atopic dermatitis or get better; however, many continue to have sensitive skin into adulthood  Asthma and hay fever are often seen in many patients with atopic dermatitis; however, asthma flares do not necessarily occur at the same time as skin flares  PREVENTING FLARES OF ATOPIC DERMATITIS  The first step is to maintain the skin's barrier function  Keep the skin well moisturized  Avoid irritants and triggers  Use prescribed medicine when there are red or rough areas to help the skin to return to normal as quickly as possible  Try to limit scratching  If you keep the skin well moisturized, and avoid coming in contact with things you know irritate your child's skin, there will be less flares  However, some flares of atopic dermatitis are beyond your control  You should work with your health care provider to come up with a plan that minimizes flares while minimizing long term use of medications that suppress the immune system  WHAT ARE SOME OF THE TRIGGERS? Triggers are different for different people  The most common triggers are:   Heat and sweat for some individuals, cold weather for others   House dust mites, pet fur   Wool; synthetic fabrics like nylon; dyed fabrics   Tobacco smoke    Fragrances in: shampoos, soaps, lotions, laundry detergents, fabric softeners   Saliva or prolonged exposure to water  WHAT ABOUT FOOD ALLERGIES?   This is a very controversial topic, as many believe that food allergies are responsible for skin flares  In some cases, specific foods may cause worsening of atopic dermatitis; however this occurs in a minority of cases and usually happens within a few hours of ingestion  While food allergy is more common in children with eczema, foods are specific triggers for flares in only a small percentage of children  If you notice that the skin flares after certain foods you can see if eliminating one food at time makes a difference, as long as your child can still enjoy a well-balanced diet  There are blood (RAST) and skin (PRICK) tests that can check for allergies, but they are often positive in children who are not truly allergic  Therefore it is important that you work with your allergist and dermatologist to determine which foods are relevant and causing true symptoms  Extreme food elimination diets without the guidance of your doctor, which have become more popular in recent years, may even result in worsening of the skin rash due to malnutrition and avoidance of essential nutrients  TREATMENT  Treatments are aimed at minimizing exposure to irritating factors and decreasing  the skin inflammation which results in an itchy rash  There are many different treatment options, which depend on your child's rash, its location, and severity  Topical treatments include corticosteroids and steroid-like creams such as Protopic, Elidel, and Eucrisa, which are believed to not thin the skin  Please read the discussions below regarding risks and benefits of all of these creams  Occasionally bacterial or viral infections can occur which flare the skin and require oral and/or topical antibiotics or antivirals  In some cases bleach baths 2-3 times weekly can be helpful to prevent recurrent infection  For severe disease, strong oral medications such as corticosteroids, methotrexate or azathioprine (Imuran) may be needed  These medications require close monitoring and follow-up   You should discuss the risks/ benefits/alternatives of these medications with your health care provider to come up with the best treatment plan for your child  1) Use moisturizer all over the entire body at least THREE TIMES a day  This keeps the skin moisturized to restore the barrier function  Find a cream or ointment that your child likes - this is the most important  The medicines do not work in the bottle  The thicker the moisturizer, generally the better barrier it provides  Ointments often moisturize better than creams; and creams work better than lotions  Lotions are more useful during the summer when thick greasy ointments are uncomfortable  If you put moisturizer on the skin after bathing, while the skin is damp, it is twice as effective  The moisturizer provides a seal holding the water in the skin  You may bathe your child in warm - not hot - water, for short periods of time (no more than 5-10 minutes at a time) once a day if they like  Lightly pat your child dry with a towel and, while the skin is still damp, (within 3 minutes) apply a moisturizer from head to toe  If your child is using a medicated cream, apply it and allow it to absorb completely BEFORE you apply the moisturizer  2) Apply the prescription medication TWICE A DAY to only the red, rough areas on the skin OR [place]  Put the medication on your fingers and gently rub it into the areas  Usually the medicine will help an area within a few days time  Try to put the medicine on for two days after you have noticed that the redness is no longer present; this will help the redness from returning  The severity of the rash and the strength and usage of the medication will determine how quickly you see improvement      It is important that you do not overuse steroid creams, and if you notice a thin, shiny appearance to the skin or broken blood vessels, you should stop using the cream and consult your health care provider regarding possible overuse/overthinning of the skin  The face, armpits and groin have particularly thin and sensitive skin and are therefore most at risk for bad results if steroids are over-used in these sites  3) Avoid triggers  Some children have specific things that trigger itching and rashes, while others may have none that can be identified  It may require a little bit of trial and error to see what applies to your child  Also, triggers can change over time for your child  The most common triggers are listed above; start with these  Avoid the use of fabric softeners in the washing machine or dryer sheets (unless they are fragrance-free)  Try to use laundry detergents, soaps and shampoos that are fragrance-free  You may find it helpful to double-rinse your clothes  Some children are sensitive to house dust mites and they may benefit from a plastic mattress wrap  While food allergy is more common in children with eczema, foods are specific triggers for flares in only a small percentage of children  If you notice that the skin flares after certain foods you can see if eliminating one food at time makes a difference, as long as your child can still enjoy a well-balanced diet  4) Consider using a medication like an anti-histamine by mouth to help control the itching  Scratching only makes the skin more reactive and the barrier function even more disrupted  It can cause both children and their parents to lose sleep! There are different types of anti-itch medications  Some cause more drowsiness than others  Both types are acceptable depending on your child and your preference  Start with Benadryl and if that does not work, ask for a prescription antihistamine      5) About the prescription creams:  Corticosteroid creams and ointments (generally things with "-one" or "sherly" on the end of their names): The strength of the cream or ointment depends on the name of the active ingredient    The numbers at the end do not indicate the relative strength  Thus triamcinolone 0 1% ointment, considered a mid-strength corticosteroid, is much stronger than hydrocortisone 1% even though the number following the name is much lower  Topical corticosteroids are very effective in treating atopic dermatitis  When used in the manner prescribed (to rashy areas of skin and for no more than a few weeks at a time to any one area) they are very safe  These are corticosteroids and are anti-inflammatory, not the anabolic steroids like those used illegally by some athletes  Topical non-steroid creams and ointments (immunomodulators): These creams and ointments are also called topical calcineurin inhibitors (TCIs)  These include Protopic ointment and Elidel cream  Crisaborole 2% Azeemvee Moore) is a prescription ointment that targets an enzyme called PDE4 (phosphodiesterase 4)  It is used on the skin topically to treat mild-to-moderate eczema in adults and children 3years of age and older  In total, these nonsteroidal prescriptions are used to help decrease itching and redness in the skin  They are not as strong as most steroid creams; however, it is believed that they do not thin the skin when overused  They are generally used as second-line medications, though they may be used alone or in conjunction with topical steroids  In sensitive areas such as the face, underarms or groin, they are often recommended  They can sting inflamed skin, but are generally well tolerated once the skin is healing  The FDA placed a black-box warning on both Elidel and Protopic in 2006 based on animal studies using the medications  Some animals developed skin cancer and lymphoma  Subsequently, the FDA released a statement that there is no causal relationship between the two medications and cancer  Because of this concern, there are ongoing studies to evaluate this relationship in humans    So far, there are studies that support the safety of these medications  One showed that the rates of cancer in patient using these medications topically were less than the rates of the general population and another showed that in patient's using the medication over a large area of the body, the levels of the medication in the blood was undetectable  As for Eucrisa, this product is only approved for the topical treatment of mild-to-moderate eczema in patients 3years of age and older; use of the medication in kids younger than 2 is considered off label and has not been formally studied  Burning and stinging are the most commonly reported side effects of this medication  Rarely, this product has been known to cause hives and hypersensitivity reactions; discontinue its use if you develop severe itching, swelling, or redness in the area of application

## 2020-01-15 ENCOUNTER — TRANSCRIBE ORDERS (OUTPATIENT)
Dept: LAB | Facility: CLINIC | Age: 75
End: 2020-01-15

## 2020-01-15 ENCOUNTER — LAB (OUTPATIENT)
Dept: LAB | Facility: CLINIC | Age: 75
End: 2020-01-15
Payer: COMMERCIAL

## 2020-01-15 ENCOUNTER — OFFICE VISIT (OUTPATIENT)
Dept: FAMILY MEDICINE CLINIC | Facility: CLINIC | Age: 75
End: 2020-01-15
Payer: COMMERCIAL

## 2020-01-15 VITALS
WEIGHT: 130 LBS | SYSTOLIC BLOOD PRESSURE: 144 MMHG | DIASTOLIC BLOOD PRESSURE: 74 MMHG | HEART RATE: 68 BPM | HEIGHT: 67 IN | BODY MASS INDEX: 20.4 KG/M2

## 2020-01-15 DIAGNOSIS — I10 ESSENTIAL HYPERTENSION: ICD-10-CM

## 2020-01-15 DIAGNOSIS — I25.10 CORONARY ARTERY DISEASE INVOLVING NATIVE CORONARY ARTERY OF NATIVE HEART WITHOUT ANGINA PECTORIS: ICD-10-CM

## 2020-01-15 DIAGNOSIS — Z66 DNR (DO NOT RESUSCITATE): ICD-10-CM

## 2020-01-15 DIAGNOSIS — E11.59 TYPE 2 DIABETES MELLITUS WITH OTHER CIRCULATORY COMPLICATION, WITHOUT LONG-TERM CURRENT USE OF INSULIN (HCC): ICD-10-CM

## 2020-01-15 DIAGNOSIS — N18.30 CHRONIC KIDNEY DISEASE, STAGE III (MODERATE) (HCC): Primary | ICD-10-CM

## 2020-01-15 DIAGNOSIS — Z79.52 LONG TERM (CURRENT) USE OF SYSTEMIC STEROIDS: ICD-10-CM

## 2020-01-15 DIAGNOSIS — E11.69 HYPERLIPIDEMIA ASSOCIATED WITH TYPE 2 DIABETES MELLITUS (HCC): ICD-10-CM

## 2020-01-15 DIAGNOSIS — D50.9 IRON DEFICIENCY ANEMIA, UNSPECIFIED IRON DEFICIENCY ANEMIA TYPE: ICD-10-CM

## 2020-01-15 DIAGNOSIS — D13.2 ADENOMATOUS DUODENAL POLYP: ICD-10-CM

## 2020-01-15 DIAGNOSIS — F33.0 MILD EPISODE OF RECURRENT MAJOR DEPRESSIVE DISORDER (HCC): ICD-10-CM

## 2020-01-15 DIAGNOSIS — N18.4 STAGE 4 CHRONIC KIDNEY DISEASE (HCC): ICD-10-CM

## 2020-01-15 DIAGNOSIS — E78.5 HYPERLIPIDEMIA ASSOCIATED WITH TYPE 2 DIABETES MELLITUS (HCC): ICD-10-CM

## 2020-01-15 DIAGNOSIS — C64.9 MALIGNANT NEOPLASM OF KIDNEY EXCLUDING RENAL PELVIS, UNSPECIFIED LATERALITY (HCC): ICD-10-CM

## 2020-01-15 DIAGNOSIS — C64.1 RENAL CELL CARCINOMA, RIGHT (HCC): ICD-10-CM

## 2020-01-15 DIAGNOSIS — F03.90 PRESENILE DEMENTIA WITH DEPRESSION WITHOUT BEHAVIORAL DISTURBANCE (HCC): ICD-10-CM

## 2020-01-15 DIAGNOSIS — E87.5 HYPERPOTASSEMIA: ICD-10-CM

## 2020-01-15 DIAGNOSIS — E27.40 ADRENAL INSUFFICIENCY (HCC): ICD-10-CM

## 2020-01-15 DIAGNOSIS — Z72.0 TOBACCO ABUSE: ICD-10-CM

## 2020-01-15 DIAGNOSIS — N18.4 STAGE 4 CHRONIC KIDNEY DISEASE (HCC): Primary | ICD-10-CM

## 2020-01-15 DIAGNOSIS — I12.9 PARENCHYMAL RENAL HYPERTENSION, STAGE 1 THROUGH STAGE 4 OR UNSPECIFIED CHRONIC KIDNEY DISEASE: ICD-10-CM

## 2020-01-15 DIAGNOSIS — F32.A PRESENILE DEMENTIA WITH DEPRESSION WITHOUT BEHAVIORAL DISTURBANCE (HCC): ICD-10-CM

## 2020-01-15 DIAGNOSIS — C61 PROSTATE CANCER (HCC): ICD-10-CM

## 2020-01-15 PROBLEM — K63.5 COLON POLYP: Status: ACTIVE | Noted: 2020-01-15

## 2020-01-15 PROBLEM — R59.0 RETROPERITONEAL LYMPHADENOPATHY: Status: ACTIVE | Noted: 2017-02-03

## 2020-01-15 PROBLEM — R00.1 BRADYCARDIA, SINUS: Status: ACTIVE | Noted: 2017-09-07

## 2020-01-15 PROBLEM — N17.9 AKI (ACUTE KIDNEY INJURY) (HCC): Status: ACTIVE | Noted: 2017-09-07

## 2020-01-15 PROBLEM — I21.4 NSTEMI (NON-ST ELEVATED MYOCARDIAL INFARCTION) (HCC): Status: ACTIVE | Noted: 2017-04-13

## 2020-01-15 PROBLEM — L20.9 ATOPIC DERMATITIS: Status: ACTIVE | Noted: 2019-06-10

## 2020-01-15 PROBLEM — N18.9 CKD (CHRONIC KIDNEY DISEASE): Status: ACTIVE | Noted: 2018-02-21

## 2020-01-15 PROBLEM — G93.40 ENCEPHALOPATHY ACUTE: Status: ACTIVE | Noted: 2017-09-07

## 2020-01-15 PROBLEM — R35.0 INCREASED FREQUENCY OF URINATION: Status: ACTIVE | Noted: 2018-09-06

## 2020-01-15 LAB
25(OH)D3 SERPL-MCNC: 24.3 NG/ML (ref 30–100)
ALBUMIN SERPL BCP-MCNC: 3.6 G/DL (ref 3.5–5)
ALP SERPL-CCNC: 68 U/L (ref 46–116)
ALT SERPL W P-5'-P-CCNC: 22 U/L (ref 12–78)
ANION GAP SERPL CALCULATED.3IONS-SCNC: 5 MMOL/L (ref 4–13)
AST SERPL W P-5'-P-CCNC: 14 U/L (ref 5–45)
BASOPHILS # BLD AUTO: 0.09 THOUSANDS/ΜL (ref 0–0.1)
BASOPHILS NFR BLD AUTO: 1 % (ref 0–1)
BILIRUB SERPL-MCNC: 0.28 MG/DL (ref 0.2–1)
BUN SERPL-MCNC: 52 MG/DL (ref 5–25)
CALCIUM SERPL-MCNC: 9.4 MG/DL (ref 8.3–10.1)
CHLORIDE SERPL-SCNC: 109 MMOL/L (ref 100–108)
CHOLEST SERPL-MCNC: 171 MG/DL (ref 50–200)
CO2 SERPL-SCNC: 25 MMOL/L (ref 21–32)
CREAT SERPL-MCNC: 2.73 MG/DL (ref 0.6–1.3)
EOSINOPHIL # BLD AUTO: 0.34 THOUSAND/ΜL (ref 0–0.61)
EOSINOPHIL NFR BLD AUTO: 4 % (ref 0–6)
ERYTHROCYTE [DISTWIDTH] IN BLOOD BY AUTOMATED COUNT: 15.9 % (ref 11.6–15.1)
EST. AVERAGE GLUCOSE BLD GHB EST-MCNC: 120 MG/DL
GFR SERPL CREATININE-BSD FRML MDRD: 22 ML/MIN/1.73SQ M
GLUCOSE P FAST SERPL-MCNC: 124 MG/DL (ref 65–99)
HBA1C MFR BLD: 5.8 % (ref 4.2–6.3)
HCT VFR BLD AUTO: 38.2 % (ref 36.5–49.3)
HDLC SERPL-MCNC: 42 MG/DL
HGB BLD-MCNC: 11.7 G/DL (ref 12–17)
IMM GRANULOCYTES # BLD AUTO: 0.01 THOUSAND/UL (ref 0–0.2)
IMM GRANULOCYTES NFR BLD AUTO: 0 % (ref 0–2)
LDLC SERPL CALC-MCNC: 101 MG/DL (ref 0–100)
LYMPHOCYTES # BLD AUTO: 0.87 THOUSANDS/ΜL (ref 0.6–4.47)
LYMPHOCYTES NFR BLD AUTO: 11 % (ref 14–44)
MCH RBC QN AUTO: 28.5 PG (ref 26.8–34.3)
MCHC RBC AUTO-ENTMCNC: 30.6 G/DL (ref 31.4–37.4)
MCV RBC AUTO: 93 FL (ref 82–98)
MONOCYTES # BLD AUTO: 0.55 THOUSAND/ΜL (ref 0.17–1.22)
MONOCYTES NFR BLD AUTO: 7 % (ref 4–12)
NEUTROPHILS # BLD AUTO: 5.87 THOUSANDS/ΜL (ref 1.85–7.62)
NEUTS SEG NFR BLD AUTO: 77 % (ref 43–75)
NRBC BLD AUTO-RTO: 0 /100 WBCS
PLATELET # BLD AUTO: 357 THOUSANDS/UL (ref 149–390)
PMV BLD AUTO: 10.8 FL (ref 8.9–12.7)
POTASSIUM SERPL-SCNC: 5.5 MMOL/L (ref 3.5–5.3)
PROT SERPL-MCNC: 7.3 G/DL (ref 6.4–8.2)
RBC # BLD AUTO: 4.1 MILLION/UL (ref 3.88–5.62)
SODIUM SERPL-SCNC: 139 MMOL/L (ref 136–145)
T4 FREE SERPL-MCNC: 1.16 NG/DL (ref 0.76–1.46)
TRIGL SERPL-MCNC: 140 MG/DL
TSH SERPL DL<=0.05 MIU/L-ACNC: 1.47 UIU/ML (ref 0.36–3.74)
WBC # BLD AUTO: 7.73 THOUSAND/UL (ref 4.31–10.16)

## 2020-01-15 PROCEDURE — 85025 COMPLETE CBC W/AUTO DIFF WBC: CPT

## 2020-01-15 PROCEDURE — 82306 VITAMIN D 25 HYDROXY: CPT

## 2020-01-15 PROCEDURE — 1160F RVW MEDS BY RX/DR IN RCRD: CPT | Performed by: FAMILY MEDICINE

## 2020-01-15 PROCEDURE — 36415 COLL VENOUS BLD VENIPUNCTURE: CPT

## 2020-01-15 PROCEDURE — 84443 ASSAY THYROID STIM HORMONE: CPT

## 2020-01-15 PROCEDURE — 99204 OFFICE O/P NEW MOD 45 MIN: CPT | Performed by: FAMILY MEDICINE

## 2020-01-15 PROCEDURE — 3725F SCREEN DEPRESSION PERFORMED: CPT | Performed by: FAMILY MEDICINE

## 2020-01-15 PROCEDURE — 80053 COMPREHEN METABOLIC PANEL: CPT

## 2020-01-15 PROCEDURE — 84439 ASSAY OF FREE THYROXINE: CPT

## 2020-01-15 PROCEDURE — 80061 LIPID PANEL: CPT

## 2020-01-15 PROCEDURE — 83036 HEMOGLOBIN GLYCOSYLATED A1C: CPT

## 2020-01-15 PROCEDURE — 83970 ASSAY OF PARATHORMONE: CPT

## 2020-01-15 NOTE — ASSESSMENT & PLAN NOTE
No results found for: HGBA1C  Patient will be due for lipid panel at this point  He is on atorvastatin  No changes

## 2020-01-15 NOTE — ASSESSMENT & PLAN NOTE
Noted previously  Will follow-up in the future  Patient reports that it was an intestinal polyp, so at some point will need to look for old records for this

## 2020-01-15 NOTE — ASSESSMENT & PLAN NOTE
Blood pressure today was actually fairly stable  This is off the amlodipine that he was having problems with  Given that he only stopped that approximately 2 days ago, would elect not to add any medications back, and would prefer to wait and re-evaluate after laboratory studies have come back  At that point, we could consider Cardizem  This would remain with calcium channel blocker, but a different class of calcium channel blocker than the amlodipine, hopefully with decreased problems and side effects  I would be a little concerned about Ace inhibitors in this patient given his prior elevation in creatinine

## 2020-01-15 NOTE — ASSESSMENT & PLAN NOTE
Denies any current problems  PHQ was high  He has not on meds as prior PCP refused Rx  He has not noted any changes recently  Negative SI, positive contract  As he has been off the medication for about 3 weeks, would recommend that we re-evaluate in several weeks, and then consider renewing the medication

## 2020-01-15 NOTE — ASSESSMENT & PLAN NOTE
Patient denies any current problems with heart disease  Would recommend follow with Cardiology as scheduled  Currently, he was having some problems with amlodipine so he is off of that    Patient also reports

## 2020-01-15 NOTE — ASSESSMENT & PLAN NOTE
I did recommend quit smoking  Patient at this point does wish to quit, so I would offer him nicotine patches

## 2020-01-15 NOTE — ASSESSMENT & PLAN NOTE
Stable  Follow with Nephrology at [facility] THE [facility]

## 2020-01-15 NOTE — PATIENT INSTRUCTIONS
Problem List Items Addressed This Visit     Adenomatous duodenal polyp     Noted previously  Will follow-up in the future  Patient reports that it was an intestinal polyp, so at some point will need to look for old records for this  CKD (chronic kidney disease) - Primary     Patient is due for laboratory studies  These will be redone  Ordered today  Relevant Orders    Comprehensive metabolic panel    Coronary artery disease involving native coronary artery of native heart without angina pectoris     Patient denies any current problems with heart disease  Would recommend follow with Cardiology as scheduled  Currently, he was having some problems with amlodipine so he is off of that  Patient also reports         Relevant Orders    Comprehensive metabolic panel    Depression     Denies any current problems  PHQ was high  He has not on meds as prior PCP refused Rx  He has not noted any changes recently  Negative SI, positive contract  As he has been off the medication for about 3 weeks, would recommend that we re-evaluate in several weeks, and then consider renewing the medication  Relevant Orders    TSH, 3rd generation    DNR (do not resuscitate)     Patient will be given 5 wishes paperwork today so that he can formalize DNR request          Hyperlipidemia associated with type 2 diabetes mellitus ([facility] [address] )       No results found for: HGBA1C  Patient will be due for lipid panel at this point  He is on atorvastatin  No changes  Relevant Orders    Lipid Panel with Direct LDL reflex    Comprehensive metabolic panel    Hypertension     Blood pressure today was actually fairly stable  This is off the amlodipine that he was having problems with  Given that he only stopped that approximately 2 days ago, would elect not to add any medications back, and would prefer to wait and re-evaluate after laboratory studies have come back  At that point, we could consider Cardizem    This would remain with calcium channel blocker, but a different class of calcium channel blocker than the amlodipine, hopefully with decreased problems and side effects  I would be a little concerned about Ace inhibitors in this patient given his prior elevation in creatinine  Relevant Orders    Comprehensive metabolic panel    TSH, 3rd generation    CBC and differential    Iron deficiency anemia     Patient does have a history of iron deficiency anemia  Will check CBC  Long term (current) use of systemic steroids     Following with nephrology and endocrinology  Prostate cancer ([facility] [address] )     Stable at the moment  Patient follows with urology at [facility] THE [facility]  Renal cell carcinoma, right (HCC)     Stable  Follow with Nephrology at [facility]  Tobacco abuse     I did recommend quit smoking  Patient at this point does wish to quit, so I would offer him nicotine patches  Type 2 diabetes mellitus (HCC)       No results found for: HGBA1C     Patient does have diabetes type 2, but no recent A1c  The last that I see was quite good previously, but it has not been done in a while  Check A1c, continue without medications at the moment  Will evaluate once the numbers are back           Relevant Orders    Hemoglobin A1C    Comprehensive metabolic panel

## 2020-01-15 NOTE — PROGRESS NOTES
Assessment and Plan:    Problem List Items Addressed This Visit     Adenomatous duodenal polyp     Noted previously  Will follow-up in the future  Patient reports that it was an intestinal polyp, so at some point will need to look for old records for this  CKD (chronic kidney disease) - Primary     Patient is due for laboratory studies  These will be redone  Ordered today  Relevant Orders    Comprehensive metabolic panel    Coronary artery disease involving native coronary artery of native heart without angina pectoris     Patient denies any current problems with heart disease  Would recommend follow with Cardiology as scheduled  Currently, he was having some problems with amlodipine so he is off of that  Patient also reports         Relevant Orders    Comprehensive metabolic panel    Depression     Denies any current problems  PHQ was high  He has not on meds as prior PCP refused Rx  He has not noted any changes recently  Negative SI, positive contract  As he has been off the medication for about 3 weeks, would recommend that we re-evaluate in several weeks, and then consider renewing the medication  Relevant Orders    TSH, 3rd generation    DNR (do not resuscitate)     Patient will be given 5 wishes paperwork today so that he can formalize DNR request          Hyperlipidemia associated with type 2 diabetes mellitus ([facility] [address] )       No results found for: HGBA1C  Patient will be due for lipid panel at this point  He is on atorvastatin  No changes  Relevant Orders    Lipid Panel with Direct LDL reflex    Comprehensive metabolic panel    Hypertension     Blood pressure today was actually fairly stable  This is off the amlodipine that he was having problems with  Given that he only stopped that approximately 2 days ago, would elect not to add any medications back, and would prefer to wait and re-evaluate after laboratory studies have come back    At that point, we could consider Cardizem  This would remain with calcium channel blocker, but a different class of calcium channel blocker than the amlodipine, hopefully with decreased problems and side effects  I would be a little concerned about Ace inhibitors in this patient given his prior elevation in creatinine  Relevant Orders    Comprehensive metabolic panel    TSH, 3rd generation    CBC and differential    Iron deficiency anemia     Patient does have a history of iron deficiency anemia  Will check CBC  Long term (current) use of systemic steroids     Following with nephrology and endocrinology  Prostate cancer ([facility] [address] )     Stable at the moment  Patient follows with urology at [address]  Renal cell carcinoma, right (HCC)     Stable  Follow with Nephrology at [address]  Tobacco abuse     I did recommend quit smoking  Patient at this point does wish to quit, so I would offer him nicotine patches  Type 2 diabetes mellitus (HCC)       No results found for: HGBA1C     Patient does have diabetes type 2, but no recent A1c  The last that I see was quite good previously, but it has not been done in a while  Check A1c, continue without medications at the moment  Will evaluate once the numbers are back  Relevant Orders    Hemoglobin A1C    Comprehensive metabolic panel                 Diagnoses and all orders for this visit:    Stage 4 chronic kidney disease ([facility] [address] )  -     Comprehensive metabolic panel; Future    Type 2 diabetes mellitus with other circulatory complication, without long-term current use of insulin (HCC)  -     Hemoglobin A1C; Future  -     Comprehensive metabolic panel; Future    Coronary artery disease involving native coronary artery of native heart without angina pectoris  -     Comprehensive metabolic panel; Future    Mild episode of recurrent major depressive disorder (HCC)  -     TSH, 3rd generation;  Future    DNR (do not resuscitate)    Hyperlipidemia associated with type 2 diabetes mellitus ([facility] [address] )  -     Lipid Panel with Direct LDL reflex; Future  -     Comprehensive metabolic panel; Future    Essential hypertension  -     Comprehensive metabolic panel; Future  -     TSH, 3rd generation; Future  -     CBC and differential; Future    Iron deficiency anemia, unspecified iron deficiency anemia type    Long term (current) use of systemic steroids    Prostate cancer (HCC)    Renal cell carcinoma, right (HCC)    Adenomatous duodenal polyp    Tobacco abuse              Subjective:      Patient ID: Christopher Holliday is a 76 y o  male  CC:    Chief Complaint   Patient presents with   Gurdeep Chavez [facility]     New patient  c/o feels horrible the past few weeks  He stopped taking the new medicine from Cardio because he thinks that may be the cause  mjs       HPI:    Patient is here because he was seeing cardiology before, and had some symptoms recently  He thought it may have been due to medications  He was on amlodipine  Patient was having nausea, bad taste in the mouth, increased fatigue and sleeping  About 2 days ago, he stepped taking any Norvasc, and felt much better today  Blood pressure was elevated prior to this, and cardiology started amlodipine in December  He had spoken with them several times about the increasing symptoms since starting the amlodipine  Patient does somke, and would like to quit at some point  The last time he tried, he became ill  Heart disease:  Patient has had MI in the past based on prior records  He does have catheterization, as well as stents  Follows with [facility] specialist     Patient does have chronic kidney disease, as had acute kidney injury previously  He does follow with Nephrology, as well as Urology  Renal cell carcinoma:  Status post excision of 1 kidney  Following with Nephrology and Urology  Urology is at [address]  Diabetes type 2:  Following with nephrology as well as endocrinology      Patient reports that he did have a colonoscopy previously, and had a polyp at that point  The following portions of the patient's history were reviewed and updated as appropriate: allergies, current medications, past family history, past medical history, past social history, past surgical history and problem list       Review of Systems   Constitutional: Negative  HENT: Negative  Eyes: Negative  Respiratory: Negative  Cardiovascular: Negative  Gastrointestinal: Negative  Endocrine: Negative  Genitourinary: Negative  Musculoskeletal: Negative  Skin: Negative  Allergic/Immunologic: Negative  Neurological: Negative  Hematological: Negative  Psychiatric/Behavioral: Negative  Data to review:       Objective:    Vitals:    01/15/20 1245   BP: 144/74   Pulse: 68   Weight: 59 kg (130 lb)   Height: 5' 7" (1 702 m)        Physical Exam   Constitutional: He is oriented to person, place, and time  He appears well-developed and well-nourished  No distress  HENT:   Head: Normocephalic and atraumatic  Right Ear: Hearing, tympanic membrane, external ear and ear canal normal    Left Ear: Hearing, tympanic membrane, external ear and ear canal normal    Nose: Nose normal  Right sinus exhibits no maxillary sinus tenderness and no frontal sinus tenderness  Left sinus exhibits no maxillary sinus tenderness and no frontal sinus tenderness  Mouth/Throat: Uvula is midline, oropharynx is clear and moist and mucous membranes are normal  No oropharyngeal exudate  Eyes: Pupils are equal, round, and reactive to light  Conjunctivae and EOM are normal  Lids are everted and swept, no foreign bodies found  Neck: Normal range of motion  Neck supple  Carotid bruit is not present  No tracheal deviation present  No thyromegaly present  Cardiovascular: Normal rate, regular rhythm, normal heart sounds and intact distal pulses  Exam reveals no gallop and no friction rub  Pulses are no weak pulses     No murmur heard   Pulses:       Carotid pulses are 2+ on the right side, and 2+ on the left side  Dorsalis pedis pulses are 2+ on the right side, and 2+ on the left side  Posterior tibial pulses are 2+ on the right side, and 2+ on the left side  Pulmonary/Chest: Effort normal and breath sounds normal  No respiratory distress  He has no wheezes  He has no rales  Abdominal: Soft  Bowel sounds are normal  He exhibits no distension  There is no tenderness  Feet:   Right Foot:   Skin Integrity: Negative for ulcer, skin breakdown, erythema, warmth, callus or dry skin  Left Foot:   Skin Integrity: Negative for ulcer, skin breakdown, erythema, warmth, callus or dry skin  Lymphadenopathy:     He has no cervical adenopathy  Neurological: He is alert and oriented to person, place, and time  Coordination normal    Skin: Skin is warm and dry  He is not diaphoretic  Psychiatric: He has a normal mood and affect  His behavior is normal  Judgment and thought content normal    Nursing note and vitals reviewed  Tobacco Cessation Counseling: Tobacco cessation counseling was provided  The patient is sincerely urged to quit consumption of tobacco  He is ready to quit tobacco  Medication options and side effects of medication discussed  Patient refused medication        PHQ-9 Depression Screening    PHQ-9:    Frequency of the following problems over the past two weeks:       Little interest or pleasure in doing things:  3 - nearly every day  Feeling down, depressed, or hopeless:  3 - nearly every day  Trouble falling or staying asleep, or sleeping too much:  3 - nearly every day  Feeling tired or having little energy:  3 - nearly every day  Poor appetite or overeating:  3 - nearly every day  Feeling bad about yourself - or that you are a failure or have let yourself or your family down:  0 - not at all  Trouble concentrating on things, such as reading the newspaper or watching television:  0 - not at all  Moving or speaking so slowly that other people could have noticed  Or the opposite - being so fidgety or restless that you have been moving around a lot more than usual:  0 - not at all  Thoughts that you would be better off dead, or of hurting yourself in some way:  0 - not at all  PHQ-2 Score:  6  PHQ-9 Score:  15     Diabetic Foot Exam    Patient's shoes and socks removed  Right Foot/Ankle   Right Foot Inspection  Skin Exam: skin normal and skin intact no dry skin, no warmth, no callus, no erythema, no maceration, no abnormal color, no pre-ulcer, no ulcer and no callus                          Toe Exam: ROM and strength within normal limits  Sensory   Vibration: intact  Proprioception: intact   Monofilament testing: intact  Vascular  Capillary refills: < 3 seconds  The right DP pulse is 2+  The right PT pulse is 2+  Left Foot/Ankle  Left Foot Inspection  Skin Exam: skin normal and skin intactno dry skin, no warmth, no erythema, no maceration, normal color, no pre-ulcer, no ulcer and no callus                         Toe Exam: ROM and strength within normal limits                   Sensory   Vibration: intact  Proprioception: intact  Monofilament: intact  Vascular  Capillary refills: < 3 seconds  The left DP pulse is 2+  The left PT pulse is 2+  Assign Risk Category:  No deformity present; No loss of protective sensation;  No weak pulses       Risk: 0

## 2020-01-15 NOTE — ASSESSMENT & PLAN NOTE
No results found for: HGBA1C     Patient does have diabetes type 2, but no recent A1c  The last that I see was quite good previously, but it has not been done in a while  Check A1c, continue without medications at the moment  Will evaluate once the numbers are back

## 2020-01-16 ENCOUNTER — APPOINTMENT (OUTPATIENT)
Dept: LAB | Facility: CLINIC | Age: 75
End: 2020-01-16
Payer: COMMERCIAL

## 2020-01-16 LAB
CREAT UR-MCNC: 80.7 MG/DL
PROT UR-MCNC: 74 MG/DL
PROT/CREAT UR: 0.92 MG/G{CREAT} (ref 0–0.1)

## 2020-01-16 PROCEDURE — 84156 ASSAY OF PROTEIN URINE: CPT

## 2020-01-16 PROCEDURE — 82570 ASSAY OF URINE CREATININE: CPT

## 2020-01-17 DIAGNOSIS — F41.9 ANXIETY: Primary | ICD-10-CM

## 2020-01-17 DIAGNOSIS — F33.0 MILD EPISODE OF RECURRENT MAJOR DEPRESSIVE DISORDER (HCC): ICD-10-CM

## 2020-01-17 LAB — PTH-INTACT SERPL-MCNC: 217.9 PG/ML (ref 18.4–80.1)

## 2020-01-17 RX ORDER — MIRTAZAPINE 30 MG/1
30 TABLET, FILM COATED ORAL
Qty: 30 TABLET | Refills: 2 | Status: SHIPPED | OUTPATIENT
Start: 2020-01-17 | End: 2020-01-18 | Stop reason: SDUPTHER

## 2020-01-18 DIAGNOSIS — F33.0 MILD EPISODE OF RECURRENT MAJOR DEPRESSIVE DISORDER (HCC): ICD-10-CM

## 2020-01-18 DIAGNOSIS — F41.9 ANXIETY: ICD-10-CM

## 2020-01-18 RX ORDER — MIRTAZAPINE 30 MG/1
30 TABLET, FILM COATED ORAL
Qty: 30 TABLET | Refills: 2 | Status: SHIPPED | OUTPATIENT
Start: 2020-01-18 | End: 2020-04-10

## 2020-02-03 ENCOUNTER — VBI (OUTPATIENT)
Dept: FAMILY MEDICINE CLINIC | Facility: CLINIC | Age: 75
End: 2020-02-03

## 2020-02-03 ENCOUNTER — OFFICE VISIT (OUTPATIENT)
Dept: FAMILY MEDICINE CLINIC | Facility: CLINIC | Age: 75
End: 2020-02-03
Payer: COMMERCIAL

## 2020-02-03 VITALS
WEIGHT: 136.2 LBS | DIASTOLIC BLOOD PRESSURE: 72 MMHG | BODY MASS INDEX: 21.38 KG/M2 | HEART RATE: 68 BPM | SYSTOLIC BLOOD PRESSURE: 132 MMHG | HEIGHT: 67 IN

## 2020-02-03 DIAGNOSIS — I10 ESSENTIAL HYPERTENSION: ICD-10-CM

## 2020-02-03 DIAGNOSIS — N18.4 STAGE 4 CHRONIC KIDNEY DISEASE (HCC): Primary | ICD-10-CM

## 2020-02-03 DIAGNOSIS — E21.3 HYPERPARATHYROIDISM (HCC): ICD-10-CM

## 2020-02-03 DIAGNOSIS — E55.9 VITAMIN D DEFICIENCY: ICD-10-CM

## 2020-02-03 DIAGNOSIS — E11.59 TYPE 2 DIABETES MELLITUS WITH OTHER CIRCULATORY COMPLICATION, WITHOUT LONG-TERM CURRENT USE OF INSULIN (HCC): ICD-10-CM

## 2020-02-03 DIAGNOSIS — C64.1 RENAL CELL CARCINOMA, RIGHT (HCC): ICD-10-CM

## 2020-02-03 PROBLEM — N25.81 SECONDARY HYPERPARATHYROIDISM OF RENAL ORIGIN (HCC): Status: ACTIVE | Noted: 2020-01-21

## 2020-02-03 PROCEDURE — 3078F DIAST BP <80 MM HG: CPT | Performed by: FAMILY MEDICINE

## 2020-02-03 PROCEDURE — 3008F BODY MASS INDEX DOCD: CPT | Performed by: FAMILY MEDICINE

## 2020-02-03 PROCEDURE — 1160F RVW MEDS BY RX/DR IN RCRD: CPT | Performed by: FAMILY MEDICINE

## 2020-02-03 PROCEDURE — 3066F NEPHROPATHY DOC TX: CPT | Performed by: FAMILY MEDICINE

## 2020-02-03 PROCEDURE — 3044F HG A1C LEVEL LT 7.0%: CPT | Performed by: FAMILY MEDICINE

## 2020-02-03 PROCEDURE — 4040F PNEUMOC VAC/ADMIN/RCVD: CPT | Performed by: FAMILY MEDICINE

## 2020-02-03 PROCEDURE — 3075F SYST BP GE 130 - 139MM HG: CPT | Performed by: FAMILY MEDICINE

## 2020-02-03 PROCEDURE — 99214 OFFICE O/P EST MOD 30 MIN: CPT | Performed by: FAMILY MEDICINE

## 2020-02-03 PROCEDURE — 4004F PT TOBACCO SCREEN RCVD TLK: CPT | Performed by: FAMILY MEDICINE

## 2020-02-03 NOTE — ASSESSMENT & PLAN NOTE
Lab Results   Component Value Date    HGBA1C 5 8 01/15/2020   Patient's A1c was 5 8, which is excellent  He is not currently on medications  What ever he is doing with carbohydrate intake as well as exercise, he should continue this as long as possible  This does not need treatment at this point, I would not initiate treatment until is A1c was over 7 5

## 2020-02-03 NOTE — PROGRESS NOTES
Assessment and Plan:    Problem List Items Addressed This Visit     CKD (chronic kidney disease) - Primary     CKD at this point seems to be slightly improved versus before  Prior creatinine was 2 9, now at 2 7  Continue with blood sugar control, blood pressure control, increase hydration, follow-up in the future  Relevant Orders    Comprehensive metabolic panel    Clear cell carcinoma of kidney [facility])     Follow with Nephrology  Also follow with Urology  No changes  Hyperparathyroidism ([facility] )     PTH was quite a bit elevated, but calcium level was normal   He did see Nephrology recently, they recommended repeat in 6 months, and follow-up then  Hypertension     Blood pressure today was actually quite good  Currently on hydralazine  Carvedilol  No other medications  Relevant Orders    Comprehensive metabolic panel    Type 2 diabetes mellitus ([facility] )       Lab Results   Component Value Date    HGBA1C 5 8 01/15/2020   Patient's A1c was 5 8, which is excellent  He is not currently on medications  What ever he is doing with carbohydrate intake as well as exercise, he should continue this as long as possible  This does not need treatment at this point, I would not initiate treatment until is A1c was over 7 5  Relevant Orders    Hemoglobin A1C    Comprehensive metabolic panel    Vitamin D deficiency     Vitamin-D deficiency noted on most recent labs, nephrology had recommended that he start 2000 units daily of vitamin-D  He will get some of that at home, and initiate treatment with supplementation  Relevant Medications    Cholecalciferol ( VITAMIN D3) 50 MCG (2000 UT) CAPS                 Diagnoses and all orders for this visit:    Stage 4 chronic kidney disease ([facility] )  -     Comprehensive metabolic panel; Future    Essential hypertension  -     Comprehensive metabolic panel;  Future    Renal cell carcinoma, right (HCC)  -     Comprehensive metabolic panel; Future    Vitamin D deficiency  -     Cholecalciferol ( VITAMIN D3) 50 MCG (2000 UT) CAPS; Take 1 capsule (2,000 Units total) by mouth daily    Type 2 diabetes mellitus with other circulatory complication, without long-term current use of insulin ([facility])  -     Hemoglobin A1C; Future  -     Comprehensive metabolic panel; Future    Hyperparathyroidism ([facility] [address] )              Subjective:      Patient ID: Vani Escalante is a 76 y o  male  CC:    Chief Complaint   Patient presents with    Follow-up     Follow up and review labs  kw    Hypertension    Diabetes    Chronic Kidney Disease       HPI:    Patient is here to follow-up on multiple issues  With regard to kidney disease, he did see the nephrologist recently, they prefer to treat him as if he has a creatinine clearance less than 25, and to have blood pressure less than 140/90  They are deferring treatment for blood pressure to this office as well  Dialysis was discussed at the visit as well  Vitamin-D level was low per Nephrology  They did recommend supplementation  He did mention that he has been forgetting some things recently, such as keys, wallet, money  He did mention that he was somewhat preoccupied  He was bothered by the discussion of Renal replacement therapy  The following portions of the patient's history were reviewed and updated as appropriate: allergies, current medications, past family history, past medical history, past social history, past surgical history and problem list       Review of Systems   Constitutional: Negative  HENT: Negative  Eyes: Negative  Respiratory: Negative  Cardiovascular: Negative  Gastrointestinal: Negative  Endocrine: Negative  Genitourinary: Negative  Musculoskeletal: Negative  Skin: Negative  Allergic/Immunologic: Negative  Neurological: Negative  Hematological: Negative  Psychiatric/Behavioral: Negative            Data to review:   Patient did recently see Nephrology  Reviewed notes  TSH 1 470  T4 1  16  Na 139, K 5 5, Ca 9 4  Creat 2 73  GFR 22    A1C 5 8  WBC 7 73, HGB 11 7,  Hct 38 2, plt 357   9  Vit D 24 3  Prot/creat 0 92  CT scan via [facility] shows essentially stable CT of the abdomen and pelvis, no definite findings to suggest regional or distant metastatic disease, compatible with prior partial right nephrectomy  Objective:    Vitals:    02/03/20 1052   BP: 132/72   BP Location: Left arm   Patient Position: Sitting   Pulse: 68   Weight: 61 8 kg (136 lb 3 2 oz)   Height: 5' 7" (1 702 m)        Physical Exam   Constitutional: He appears well-developed and well-nourished  HENT:   Head: Normocephalic and atraumatic  Neck: Normal range of motion  Neck supple  Cardiovascular: Normal rate, regular rhythm and normal heart sounds  Exam reveals no gallop and no friction rub  No murmur heard  Pulses:       Carotid pulses are 2+ on the right side, and 2+ on the left side  Pulmonary/Chest: Effort normal and breath sounds normal  No respiratory distress  He has no wheezes  He has no rales  Nursing note and vitals reviewed

## 2020-02-03 NOTE — ASSESSMENT & PLAN NOTE
CKD at this point seems to be slightly improved versus before  Prior creatinine was 2 9, now at 2 7  Continue with blood sugar control, blood pressure control, increase hydration, follow-up in the future

## 2020-02-03 NOTE — ASSESSMENT & PLAN NOTE
Blood pressure today was actually quite good  Currently on hydralazine  Carvedilol  No other medications

## 2020-02-03 NOTE — PATIENT INSTRUCTIONS
Problem List Items Addressed This Visit     CKD (chronic kidney disease) - Primary     CKD at this point seems to be slightly improved versus before  Prior creatinine was 2 9, now at 2 7  Continue with blood sugar control, blood pressure control, increase hydration, follow-up in the future  Relevant Orders    Comprehensive metabolic panel    Clear cell carcinoma of kidney [facility])     Follow with Nephrology  Also follow with Urology  No changes  Hyperparathyroidism ([facility] [address] )     PTH was quite a bit elevated, but calcium level was normal   He did see Nephrology recently, they recommended repeat in 6 months, and follow-up then  Hypertension     Blood pressure today was actually quite good  Currently on hydralazine  Carvedilol  No other medications  Relevant Orders    Comprehensive metabolic panel    Type 2 diabetes mellitus ([facility] )       Lab Results   Component Value Date    HGBA1C 5 8 01/15/2020   Patient's A1c was 5 8, which is excellent  He is not currently on medications  What ever he is doing with carbohydrate intake as well as exercise, he should continue this as long as possible  This does not need treatment at this point, I would not initiate treatment until is A1c was over 7 5  Relevant Orders    Hemoglobin A1C    Comprehensive metabolic panel    Vitamin D deficiency     Vitamin-D deficiency noted on most recent labs, nephrology had recommended that he start 2000 units daily of vitamin-D  He will get some of that at home, and initiate treatment with supplementation           Relevant Medications    Cholecalciferol ( VITAMIN D3) 50 MCG (2000 UT) CAPS

## 2020-02-03 NOTE — ASSESSMENT & PLAN NOTE
Vitamin-D deficiency noted on most recent labs, nephrology had recommended that he start 2000 units daily of vitamin-D  He will get some of that at home, and initiate treatment with supplementation

## 2020-02-03 NOTE — ASSESSMENT & PLAN NOTE
PTH was quite a bit elevated, but calcium level was normal   He did see Nephrology recently, they recommended repeat in 6 months, and follow-up then

## 2020-02-07 LAB
LEFT EYE DIABETIC RETINOPATHY: NORMAL
RIGHT EYE DIABETIC RETINOPATHY: NORMAL

## 2020-04-01 ENCOUNTER — NURSE TRIAGE (OUTPATIENT)
Dept: OTHER | Facility: OTHER | Age: 75
End: 2020-04-01

## 2020-04-02 ENCOUNTER — TELEMEDICINE (OUTPATIENT)
Dept: FAMILY MEDICINE CLINIC | Facility: CLINIC | Age: 75
End: 2020-04-02
Payer: COMMERCIAL

## 2020-04-02 ENCOUNTER — APPOINTMENT (OUTPATIENT)
Dept: LAB | Facility: MEDICAL CENTER | Age: 75
End: 2020-04-02
Payer: COMMERCIAL

## 2020-04-02 DIAGNOSIS — R31.0 GROSS HEMATURIA: Primary | ICD-10-CM

## 2020-04-02 DIAGNOSIS — C61 PROSTATE CANCER (HCC): ICD-10-CM

## 2020-04-02 DIAGNOSIS — E11.59 TYPE 2 DIABETES MELLITUS WITH OTHER CIRCULATORY COMPLICATION, WITHOUT LONG-TERM CURRENT USE OF INSULIN (HCC): ICD-10-CM

## 2020-04-02 DIAGNOSIS — C64.1 RENAL CELL CARCINOMA, RIGHT (HCC): ICD-10-CM

## 2020-04-02 DIAGNOSIS — I10 ESSENTIAL HYPERTENSION: ICD-10-CM

## 2020-04-02 DIAGNOSIS — C64.1 CLEAR CELL CARCINOMA OF RIGHT KIDNEY (HCC): ICD-10-CM

## 2020-04-02 DIAGNOSIS — N18.4 STAGE 4 CHRONIC KIDNEY DISEASE (HCC): ICD-10-CM

## 2020-04-02 PROBLEM — R31.9 HEMATURIA: Status: ACTIVE | Noted: 2020-04-02

## 2020-04-02 LAB
ALBUMIN SERPL BCP-MCNC: 3.4 G/DL (ref 3.5–5)
ALP SERPL-CCNC: 68 U/L (ref 46–116)
ALT SERPL W P-5'-P-CCNC: 17 U/L (ref 12–78)
ANION GAP SERPL CALCULATED.3IONS-SCNC: 5 MMOL/L (ref 4–13)
AST SERPL W P-5'-P-CCNC: 13 U/L (ref 5–45)
BACTERIA UR QL AUTO: ABNORMAL /HPF
BILIRUB SERPL-MCNC: 0.24 MG/DL (ref 0.2–1)
BILIRUB UR QL STRIP: NEGATIVE
BUN SERPL-MCNC: 65 MG/DL (ref 5–25)
CALCIUM SERPL-MCNC: 9.5 MG/DL (ref 8.3–10.1)
CHLORIDE SERPL-SCNC: 115 MMOL/L (ref 100–108)
CLARITY UR: ABNORMAL
CO2 SERPL-SCNC: 24 MMOL/L (ref 21–32)
COLOR UR: ABNORMAL
CREAT SERPL-MCNC: 2.98 MG/DL (ref 0.6–1.3)
EST. AVERAGE GLUCOSE BLD GHB EST-MCNC: 120 MG/DL
GFR SERPL CREATININE-BSD FRML MDRD: 20 ML/MIN/1.73SQ M
GLUCOSE SERPL-MCNC: 98 MG/DL (ref 65–140)
GLUCOSE UR STRIP-MCNC: NEGATIVE MG/DL
HBA1C MFR BLD: 5.8 %
HGB UR QL STRIP.AUTO: ABNORMAL
KETONES UR STRIP-MCNC: NEGATIVE MG/DL
LEUKOCYTE ESTERASE UR QL STRIP: ABNORMAL
NITRITE UR QL STRIP: NEGATIVE
NON-SQ EPI CELLS URNS QL MICRO: ABNORMAL /HPF
PH UR STRIP.AUTO: 6 [PH]
POTASSIUM SERPL-SCNC: 6.2 MMOL/L (ref 3.5–5.3)
PROT SERPL-MCNC: 7.5 G/DL (ref 6.4–8.2)
PROT UR STRIP-MCNC: ABNORMAL MG/DL
RBC #/AREA URNS AUTO: ABNORMAL /HPF
SODIUM SERPL-SCNC: 144 MMOL/L (ref 136–145)
SP GR UR STRIP.AUTO: 1.02 (ref 1–1.03)
UROBILINOGEN UR QL STRIP.AUTO: 0.2 E.U./DL
WBC #/AREA URNS AUTO: ABNORMAL /HPF

## 2020-04-02 PROCEDURE — 83036 HEMOGLOBIN GLYCOSYLATED A1C: CPT

## 2020-04-02 PROCEDURE — 36415 COLL VENOUS BLD VENIPUNCTURE: CPT

## 2020-04-02 PROCEDURE — 80053 COMPREHEN METABOLIC PANEL: CPT

## 2020-04-02 PROCEDURE — G2012 BRIEF CHECK IN BY MD/QHP: HCPCS | Performed by: FAMILY MEDICINE

## 2020-04-02 PROCEDURE — 81001 URINALYSIS AUTO W/SCOPE: CPT | Performed by: FAMILY MEDICINE

## 2020-04-02 RX ORDER — SULFAMETHOXAZOLE AND TRIMETHOPRIM 800; 160 MG/1; MG/1
1 TABLET ORAL EVERY 12 HOURS SCHEDULED
Qty: 20 TABLET | Refills: 0 | Status: SHIPPED | OUTPATIENT
Start: 2020-04-02 | End: 2020-04-12

## 2020-04-02 RX ORDER — CARVEDILOL 25 MG/1
25 TABLET ORAL 2 TIMES DAILY WITH MEALS
COMMUNITY
Start: 2020-02-08 | End: 2021-01-01 | Stop reason: SDUPTHER

## 2020-04-02 RX ORDER — CLOPIDOGREL BISULFATE 75 MG/1
75 TABLET ORAL DAILY
COMMUNITY
Start: 2020-02-07 | End: 2021-01-01 | Stop reason: SDUPTHER

## 2020-04-03 DIAGNOSIS — I10 ESSENTIAL HYPERTENSION: ICD-10-CM

## 2020-04-03 DIAGNOSIS — N18.4 STAGE 4 CHRONIC KIDNEY DISEASE (HCC): Primary | ICD-10-CM

## 2020-04-03 DIAGNOSIS — E87.5 HYPERKALEMIA: ICD-10-CM

## 2020-04-05 ENCOUNTER — TELEPHONE (OUTPATIENT)
Dept: FAMILY MEDICINE CLINIC | Facility: CLINIC | Age: 75
End: 2020-04-05

## 2020-04-05 ENCOUNTER — NURSE TRIAGE (OUTPATIENT)
Dept: OTHER | Facility: OTHER | Age: 75
End: 2020-04-05

## 2020-04-09 ENCOUNTER — TELEPHONE (OUTPATIENT)
Dept: FAMILY MEDICINE CLINIC | Facility: CLINIC | Age: 75
End: 2020-04-09

## 2020-04-10 DIAGNOSIS — F41.9 ANXIETY: ICD-10-CM

## 2020-04-10 DIAGNOSIS — F33.0 MILD EPISODE OF RECURRENT MAJOR DEPRESSIVE DISORDER (HCC): ICD-10-CM

## 2020-04-10 RX ORDER — MIRTAZAPINE 30 MG/1
30 TABLET, FILM COATED ORAL
Qty: 90 TABLET | Refills: 0 | Status: SHIPPED | OUTPATIENT
Start: 2020-04-10 | End: 2020-07-02

## 2020-04-20 ENCOUNTER — APPOINTMENT (OUTPATIENT)
Dept: LAB | Facility: MEDICAL CENTER | Age: 75
End: 2020-04-20
Payer: COMMERCIAL

## 2020-04-20 DIAGNOSIS — N18.4 STAGE 4 CHRONIC KIDNEY DISEASE (HCC): ICD-10-CM

## 2020-04-20 DIAGNOSIS — I10 ESSENTIAL HYPERTENSION: ICD-10-CM

## 2020-04-20 DIAGNOSIS — E87.5 HYPERKALEMIA: ICD-10-CM

## 2020-04-20 LAB
ANION GAP SERPL CALCULATED.3IONS-SCNC: 7 MMOL/L (ref 4–13)
BUN SERPL-MCNC: 72 MG/DL (ref 5–25)
CALCIUM SERPL-MCNC: 8.9 MG/DL (ref 8.3–10.1)
CHLORIDE SERPL-SCNC: 112 MMOL/L (ref 100–108)
CO2 SERPL-SCNC: 22 MMOL/L (ref 21–32)
CREAT SERPL-MCNC: 3 MG/DL (ref 0.6–1.3)
GFR SERPL CREATININE-BSD FRML MDRD: 20 ML/MIN/1.73SQ M
GLUCOSE SERPL-MCNC: 116 MG/DL (ref 65–140)
POTASSIUM SERPL-SCNC: 5.3 MMOL/L (ref 3.5–5.3)
SODIUM SERPL-SCNC: 141 MMOL/L (ref 136–145)

## 2020-04-20 PROCEDURE — 36415 COLL VENOUS BLD VENIPUNCTURE: CPT

## 2020-04-20 PROCEDURE — 80048 BASIC METABOLIC PNL TOTAL CA: CPT

## 2020-05-04 ENCOUNTER — TELEMEDICINE (OUTPATIENT)
Dept: ENDOCRINOLOGY | Facility: CLINIC | Age: 75
End: 2020-05-04
Payer: COMMERCIAL

## 2020-05-04 DIAGNOSIS — E21.3 HYPERPARATHYROIDISM (HCC): ICD-10-CM

## 2020-05-04 DIAGNOSIS — E27.49 SECONDARY ADRENAL INSUFFICIENCY (HCC): Primary | ICD-10-CM

## 2020-05-04 DIAGNOSIS — N18.4 STAGE 4 CHRONIC KIDNEY DISEASE (HCC): ICD-10-CM

## 2020-05-04 DIAGNOSIS — L20.9 ATOPIC DERMATITIS, UNSPECIFIED TYPE: ICD-10-CM

## 2020-05-04 PROCEDURE — 99442 PR PHYS/QHP TELEPHONE EVALUATION 11-20 MIN: CPT | Performed by: INTERNAL MEDICINE

## 2020-05-04 RX ORDER — PREDNISONE 1 MG/1
TABLET ORAL
Qty: 135 TABLET | Refills: 3 | Status: SHIPPED | OUTPATIENT
Start: 2020-05-04 | End: 2020-11-06

## 2020-05-05 ENCOUNTER — OFFICE VISIT (OUTPATIENT)
Dept: FAMILY MEDICINE CLINIC | Facility: CLINIC | Age: 75
End: 2020-05-05
Payer: COMMERCIAL

## 2020-05-05 VITALS
HEIGHT: 67 IN | TEMPERATURE: 98.4 F | HEART RATE: 74 BPM | SYSTOLIC BLOOD PRESSURE: 160 MMHG | BODY MASS INDEX: 21.19 KG/M2 | DIASTOLIC BLOOD PRESSURE: 88 MMHG | WEIGHT: 135 LBS

## 2020-05-05 DIAGNOSIS — E27.49 SECONDARY ADRENAL INSUFFICIENCY (HCC): ICD-10-CM

## 2020-05-05 DIAGNOSIS — E11.69 HYPERLIPIDEMIA ASSOCIATED WITH TYPE 2 DIABETES MELLITUS (HCC): ICD-10-CM

## 2020-05-05 DIAGNOSIS — G25.81 RESTLESS LEGS SYNDROME (RLS): ICD-10-CM

## 2020-05-05 DIAGNOSIS — C64.1 CLEAR CELL CARCINOMA OF RIGHT KIDNEY (HCC): Primary | ICD-10-CM

## 2020-05-05 DIAGNOSIS — E78.5 HYPERLIPIDEMIA ASSOCIATED WITH TYPE 2 DIABETES MELLITUS (HCC): ICD-10-CM

## 2020-05-05 DIAGNOSIS — N18.4 STAGE 4 CHRONIC KIDNEY DISEASE (HCC): ICD-10-CM

## 2020-05-05 DIAGNOSIS — R31.0 GROSS HEMATURIA: ICD-10-CM

## 2020-05-05 DIAGNOSIS — E11.59 TYPE 2 DIABETES MELLITUS WITH OTHER CIRCULATORY COMPLICATION, WITHOUT LONG-TERM CURRENT USE OF INSULIN (HCC): ICD-10-CM

## 2020-05-05 DIAGNOSIS — I10 ESSENTIAL HYPERTENSION: ICD-10-CM

## 2020-05-05 PROCEDURE — 3044F HG A1C LEVEL LT 7.0%: CPT | Performed by: FAMILY MEDICINE

## 2020-05-05 PROCEDURE — 3079F DIAST BP 80-89 MM HG: CPT | Performed by: FAMILY MEDICINE

## 2020-05-05 PROCEDURE — 1170F FXNL STATUS ASSESSED: CPT | Performed by: FAMILY MEDICINE

## 2020-05-05 PROCEDURE — 2022F DILAT RTA XM EVC RTNOPTHY: CPT | Performed by: FAMILY MEDICINE

## 2020-05-05 PROCEDURE — 3008F BODY MASS INDEX DOCD: CPT | Performed by: FAMILY MEDICINE

## 2020-05-05 PROCEDURE — 3066F NEPHROPATHY DOC TX: CPT | Performed by: FAMILY MEDICINE

## 2020-05-05 PROCEDURE — 1160F RVW MEDS BY RX/DR IN RCRD: CPT | Performed by: FAMILY MEDICINE

## 2020-05-05 PROCEDURE — 3077F SYST BP >= 140 MM HG: CPT | Performed by: FAMILY MEDICINE

## 2020-05-05 PROCEDURE — 1125F AMNT PAIN NOTED PAIN PRSNT: CPT | Performed by: FAMILY MEDICINE

## 2020-05-05 PROCEDURE — 4004F PT TOBACCO SCREEN RCVD TLK: CPT | Performed by: FAMILY MEDICINE

## 2020-05-05 PROCEDURE — 99214 OFFICE O/P EST MOD 30 MIN: CPT | Performed by: FAMILY MEDICINE

## 2020-05-05 PROCEDURE — 4040F PNEUMOC VAC/ADMIN/RCVD: CPT | Performed by: FAMILY MEDICINE

## 2020-05-05 PROCEDURE — G0439 PPPS, SUBSEQ VISIT: HCPCS | Performed by: FAMILY MEDICINE

## 2020-07-02 DIAGNOSIS — F41.9 ANXIETY: ICD-10-CM

## 2020-07-02 DIAGNOSIS — F33.0 MILD EPISODE OF RECURRENT MAJOR DEPRESSIVE DISORDER (HCC): ICD-10-CM

## 2020-07-02 RX ORDER — MIRTAZAPINE 30 MG/1
30 TABLET, FILM COATED ORAL
Qty: 90 TABLET | Refills: 0 | Status: SHIPPED | OUTPATIENT
Start: 2020-07-02 | End: 2020-09-29

## 2020-07-13 LAB
LEFT EYE DIABETIC RETINOPATHY: NORMAL
RIGHT EYE DIABETIC RETINOPATHY: NORMAL

## 2020-08-24 ENCOUNTER — OFFICE VISIT (OUTPATIENT)
Dept: FAMILY MEDICINE CLINIC | Facility: CLINIC | Age: 75
End: 2020-08-24
Payer: COMMERCIAL

## 2020-08-24 VITALS
HEART RATE: 76 BPM | TEMPERATURE: 97.6 F | HEIGHT: 67 IN | DIASTOLIC BLOOD PRESSURE: 80 MMHG | WEIGHT: 133 LBS | SYSTOLIC BLOOD PRESSURE: 190 MMHG | BODY MASS INDEX: 20.88 KG/M2

## 2020-08-24 DIAGNOSIS — I25.10 CORONARY ARTERY DISEASE INVOLVING NATIVE CORONARY ARTERY OF NATIVE HEART WITHOUT ANGINA PECTORIS: ICD-10-CM

## 2020-08-24 DIAGNOSIS — E11.59 TYPE 2 DIABETES MELLITUS WITH OTHER CIRCULATORY COMPLICATION, WITHOUT LONG-TERM CURRENT USE OF INSULIN (HCC): Primary | ICD-10-CM

## 2020-08-24 DIAGNOSIS — Z71.89 ADVICE GIVEN ABOUT COVID-19 VIRUS INFECTION: ICD-10-CM

## 2020-08-24 DIAGNOSIS — N18.4 STAGE 4 CHRONIC KIDNEY DISEASE (HCC): ICD-10-CM

## 2020-08-24 DIAGNOSIS — Z72.0 TOBACCO ABUSE: ICD-10-CM

## 2020-08-24 DIAGNOSIS — I10 ESSENTIAL HYPERTENSION: ICD-10-CM

## 2020-08-24 DIAGNOSIS — J43.9 PULMONARY EMPHYSEMA, UNSPECIFIED EMPHYSEMA TYPE (HCC): ICD-10-CM

## 2020-08-24 LAB — SL AMB POCT HEMOGLOBIN AIC: 5.5 (ref ?–6.5)

## 2020-08-24 PROCEDURE — 3079F DIAST BP 80-89 MM HG: CPT | Performed by: FAMILY MEDICINE

## 2020-08-24 PROCEDURE — 99214 OFFICE O/P EST MOD 30 MIN: CPT | Performed by: FAMILY MEDICINE

## 2020-08-24 PROCEDURE — 3077F SYST BP >= 140 MM HG: CPT | Performed by: FAMILY MEDICINE

## 2020-08-24 PROCEDURE — 4040F PNEUMOC VAC/ADMIN/RCVD: CPT | Performed by: FAMILY MEDICINE

## 2020-08-24 PROCEDURE — 2022F DILAT RTA XM EVC RTNOPTHY: CPT | Performed by: FAMILY MEDICINE

## 2020-08-24 PROCEDURE — 3044F HG A1C LEVEL LT 7.0%: CPT | Performed by: FAMILY MEDICINE

## 2020-08-24 PROCEDURE — 83036 HEMOGLOBIN GLYCOSYLATED A1C: CPT | Performed by: FAMILY MEDICINE

## 2020-08-24 PROCEDURE — 1160F RVW MEDS BY RX/DR IN RCRD: CPT | Performed by: FAMILY MEDICINE

## 2020-08-24 PROCEDURE — 3008F BODY MASS INDEX DOCD: CPT | Performed by: FAMILY MEDICINE

## 2020-08-24 PROCEDURE — 4004F PT TOBACCO SCREEN RCVD TLK: CPT | Performed by: FAMILY MEDICINE

## 2020-08-24 PROCEDURE — 3066F NEPHROPATHY DOC TX: CPT | Performed by: FAMILY MEDICINE

## 2020-08-24 RX ORDER — FERROUS SULFATE 325(65) MG
325 TABLET ORAL
COMMUNITY

## 2020-08-24 RX ORDER — FLUTICASONE FUROATE AND VILANTEROL 200; 25 UG/1; UG/1
1 POWDER RESPIRATORY (INHALATION) DAILY
Qty: 1 INHALER | Refills: 5 | Status: SHIPPED | OUTPATIENT
Start: 2020-08-24 | End: 2021-01-28 | Stop reason: ALTCHOICE

## 2020-08-24 RX ORDER — ALBUTEROL SULFATE 90 UG/1
2 AEROSOL, METERED RESPIRATORY (INHALATION) EVERY 6 HOURS PRN
Qty: 1 INHALER | Refills: 5 | Status: SHIPPED | OUTPATIENT
Start: 2020-08-24 | End: 2021-04-12 | Stop reason: ALTCHOICE

## 2020-08-24 NOTE — PROGRESS NOTES
Assessment and Plan:    Problem List Items Addressed This Visit     Ch[facility] ([facility] )     Stable at the moment  He does have wheezing, so I would recommend that he use albuterol instead of Primatene as it will decrease the effects on blood pressure and heart rate  Also, consider Breo  Certainly, quitting smoking would help out  Patient would prefer to could just quit smoking or dramatically decrease it 1st, and then re-evaluate  Prescription reprinted so he can make his own decision about that in the future  Relevant Medications    albuterol (PROVENTIL HFA,VENTOLIN HFA) 90 mcg/act inhaler    fluticasone-vilanterol (BREO ELLIPTA) 200-25 MCG/INH inhaler    CKD (chronic kidney disease)     Stable  Follow with Nephrology  Coronary artery disease involving native coronary artery of native heart without angina pectoris     Stable  No changes  Follow with Cardio  Relevant Medications    albuterol (PROVENTIL HFA,VENTOLIN HFA) 90 mcg/act inhaler    fluticasone-vilanterol (BREO ELLIPTA) 200-25 MCG/INH inhaler    Hypertension     Blood pressure today is clearly elevated  He is on carvedilol and hydralazine  He reports that his blood pressures at home are better  Based on this, would not make any adjustments today  Check in approximately 6-12 weeks  Also, I would recommend that he change from Primatene to albuterol for COPD  Tobacco abuse     He is not looking to quit yet, but understands risks  Type 2 diabetes mellitus ([facility] ) - Primary       Lab Results   Component Value Date    HGBA1C 5 5 08/24/2020   A1C excellent with no meds  Would every is doing as far as diet and exercise seems to be helping out significantly with his diabetes  I would not make any adjustments, and recheck in the future           Relevant Orders    POCT hemoglobin A1c (Completed)      Other Visit Diagnoses     Advice given about COVID-19 virus infection        For visiting his wife, recommend masking, social distancing as much as possible  Certainly, nursing home will be screening  Sanitize, wash hands  Diagnoses and all orders for this visit:    Type 2 diabetes mellitus with other circulatory complication, without long-term current use of insulin (HCC)  -     POCT hemoglobin A1c    Pulmonary emphysema, unspecified emphysema type (HCC)    Stage 4 chronic kidney disease (HCC)    Coronary artery disease involving native coronary artery of native heart without angina pectoris  -     albuterol (PROVENTIL HFA,VENTOLIN HFA) 90 mcg/act inhaler; Inhale 2 puffs every 6 (six) hours as needed for wheezing or shortness of breath  -     fluticasone-vilanterol (BREO ELLIPTA) 200-25 MCG/INH inhaler; Inhale 1 puff daily Rinse mouth after use  Tobacco abuse    Essential hypertension    Advice given about COVID-19 virus infection  Comments: For visiting his wife, recommend masking, social distancing as much as possible  Certainly, nursing home will be screening  Sanitize, wash hands  Other orders  -     ferrous sulfate 325 (65 Fe) mg tablet; Take 325 mg by mouth              Subjective:      Patient ID: Gadiel Villa is a 76 y o  male  CC:    Chief Complaint   Patient presents with    Follow-up     f/u to chronic conditions  He did not get bw done  mjs       HPI:    Patient is here to follow-up on multiple issues  He did have fingerstick A1c today which was 5 5  His wife is in a nursing home  She tested positive for COVID  She is OK now  He wondered about visiting her  Reviewed PPE  Hypertension:  Patient did have high blood pressure readings previously, and noted that his blood pressure improved significantly after stopping Myrbetriq  Cardiology at the last visit recommended increasing hydralazine  Current medications are Coreg, hydralazine  He was on amlodipine before, but had side effects and problems  CKD: Saw renal recently  BP concerns then      CAD: No symptoms  Has seen Cardio  DM: A1C by finger stick was done today  Patient reports he is back to normal diet, and limits only a little bit with sugars  Reviewed old labs  It does show higher A1cs, as well as higher fasting sugars  He is not currently on medications for diabetes  Patient does have a history of COPD  He has been using Primatene mist for the last 14-15 years minimum  Uses it on occasion  The following portions of the patient's history were reviewed and updated as appropriate: allergies, current medications, past family history, past medical history, past social history, past surgical history and problem list       Review of Systems   Constitutional: Negative  HENT: Negative  Eyes: Negative  Respiratory: Negative  Cardiovascular: Negative  Gastrointestinal: Negative  Endocrine: Negative  Genitourinary: Negative  Musculoskeletal: Negative  Skin: Negative  Allergic/Immunologic: Negative  Neurological: Negative  Hematological: Negative  Psychiatric/Behavioral: Negative  Data to review:       Objective:    Vitals:    08/24/20 1617   BP: (!) 190/80   Pulse: 76   Temp: 97 6 °F (36 4 °C)   Weight: 60 3 kg (133 lb)   Height: 5' 7" (1 702 m)        Physical Exam  Vitals signs and nursing note reviewed  Constitutional:       Appearance: Normal appearance  Cardiovascular:      Rate and Rhythm: Normal rate and regular rhythm  Pulses: Normal pulses  Carotid pulses are 2+ on the right side and 2+ on the left side  Heart sounds: Normal heart sounds  No murmur  No gallop  Neurological:      Mental Status: He is alert

## 2020-08-24 NOTE — ASSESSMENT & PLAN NOTE
Lab Results   Component Value Date    HGBA1C 5 5 08/24/2020   A1C excellent with no meds  Would every is doing as far as diet and exercise seems to be helping out significantly with his diabetes  I would not make any adjustments, and recheck in the future

## 2020-08-24 NOTE — PATIENT INSTRUCTIONS
Problem List Items Addressed This Visit     [facility]     Stable at the moment  He does have wheezing, so I would recommend that he use albuterol instead of Primatene as it will decrease the effects on blood pressure and heart rate  Also, consider Breo  Certainly, quitting smoking would help out  Patient would prefer to could just quit smoking or dramatically decrease it 1st, and then re-evaluate  Prescription reprinted so he can make his own decision about that in the future  Relevant Medications    albuterol (PROVENTIL HFA,VENTOLIN HFA) 90 mcg/act inhaler    fluticasone-vilanterol (BREO ELLIPTA) 200-25 MCG/INH inhaler    CKD (chronic kidney disease)     Stable  Follow with Nephrology  Coronary artery disease involving native coronary artery of native heart without angina pectoris     Stable  No changes  Follow with Cardio  Relevant Medications    albuterol (PROVENTIL HFA,VENTOLIN HFA) 90 mcg/act inhaler    fluticasone-vilanterol (BREO ELLIPTA) 200-25 MCG/INH inhaler    Hypertension     Blood pressure today is clearly elevated  He is on carvedilol and hydralazine  He reports that his blood pressures at home are better  Based on this, would not make any adjustments today  Check in approximately 6-12 weeks  Also, I would recommend that he change from Primatene to albuterol for COPD  Tobacco abuse     He is not looking to quit yet, but understands risks  Type 2 diabetes mellitus ([facility] 75 ) - Primary       Lab Results   Component Value Date    HGBA1C 5 5 08/24/2020   A1C excellent with no meds  Would every is doing as far as diet and exercise seems to be helping out significantly with his diabetes  I would not make any adjustments, and recheck in the future           Relevant Orders    POCT hemoglobin A1c (Completed)      Other Visit Diagnoses     Advice given about COVID-19 virus infection        For visiting his wife, recommend masking, social distancing as much as possible  Certainly, nursing home will be screening  Sanitize, wash hands  COVID 19 Instructions    Lindy Bonilla was advised to limit contact with others to essential tasks such as getting food, medications, and medical care  Proper handwashing reviewed, and Hand sanitzer when washing is not available  If the patient develops symptoms of COVID 19, the patient should call the office as soon as possible  Please try to download Google Duo  Once you do download this on your phone, you will be prompted to add your phone number to the account  After that, he should receive a text from [employer], and use that code to verify your phone number  After that, you should be able to use Google Duo to receive and make video calls  Please download Microsoft Teams to your phone or computer  We will be transitioning to this platform for Video Visits  Instructions for downloading this are available from the office

## 2020-08-24 NOTE — ASSESSMENT & PLAN NOTE
Blood pressure today is clearly elevated  He is on carvedilol and hydralazine  He reports that his blood pressures at home are better  Based on this, would not make any adjustments today  Check in approximately 6-12 weeks  Also, I would recommend that he change from Primatene to albuterol for COPD

## 2020-08-24 NOTE — ASSESSMENT & PLAN NOTE
Stable at the moment  He does have wheezing, so I would recommend that he use albuterol instead of Primatene as it will decrease the effects on blood pressure and heart rate  Also, consider Cale  Certainly, quitting smoking would help out  Patient would prefer to could just quit smoking or dramatically decrease it 1st, and then re-evaluate  Prescription reprinted so he can make his own decision about that in the future

## 2020-09-26 DIAGNOSIS — F41.9 ANXIETY: ICD-10-CM

## 2020-09-26 DIAGNOSIS — F33.0 MILD EPISODE OF RECURRENT MAJOR DEPRESSIVE DISORDER (HCC): ICD-10-CM

## 2020-09-29 RX ORDER — MIRTAZAPINE 30 MG/1
30 TABLET, FILM COATED ORAL
Qty: 90 TABLET | Refills: 0 | Status: SHIPPED | OUTPATIENT
Start: 2020-09-29 | End: 2020-12-23

## 2020-10-01 ENCOUNTER — APPOINTMENT (OUTPATIENT)
Dept: LAB | Facility: CLINIC | Age: 75
End: 2020-10-01
Payer: COMMERCIAL

## 2020-10-01 DIAGNOSIS — E11.59 TYPE 2 DIABETES MELLITUS WITH OTHER CIRCULATORY COMPLICATION, WITHOUT LONG-TERM CURRENT USE OF INSULIN (HCC): ICD-10-CM

## 2020-10-01 DIAGNOSIS — N18.4 STAGE 4 CHRONIC KIDNEY DISEASE (HCC): ICD-10-CM

## 2020-10-01 DIAGNOSIS — E11.69 HYPERLIPIDEMIA ASSOCIATED WITH TYPE 2 DIABETES MELLITUS (HCC): ICD-10-CM

## 2020-10-01 DIAGNOSIS — R31.0 GROSS HEMATURIA: ICD-10-CM

## 2020-10-01 DIAGNOSIS — E78.5 HYPERLIPIDEMIA ASSOCIATED WITH TYPE 2 DIABETES MELLITUS (HCC): ICD-10-CM

## 2020-10-01 LAB
ALBUMIN SERPL BCP-MCNC: 3.1 G/DL (ref 3.5–5)
ALP SERPL-CCNC: 62 U/L (ref 46–116)
ALT SERPL W P-5'-P-CCNC: 19 U/L (ref 12–78)
ANION GAP SERPL CALCULATED.3IONS-SCNC: 7 MMOL/L (ref 4–13)
AST SERPL W P-5'-P-CCNC: 10 U/L (ref 5–45)
BACTERIA UR QL AUTO: NORMAL /HPF
BILIRUB SERPL-MCNC: 0.28 MG/DL (ref 0.2–1)
BILIRUB UR QL STRIP: NEGATIVE
BUN SERPL-MCNC: 69 MG/DL (ref 5–25)
CALCIUM ALBUM COR SERPL-MCNC: 9.6 MG/DL (ref 8.3–10.1)
CALCIUM SERPL-MCNC: 8.9 MG/DL (ref 8.3–10.1)
CHLORIDE SERPL-SCNC: 116 MMOL/L (ref 100–108)
CHOLEST SERPL-MCNC: 162 MG/DL (ref 50–200)
CLARITY UR: CLEAR
CO2 SERPL-SCNC: 21 MMOL/L (ref 21–32)
COLOR UR: YELLOW
CREAT SERPL-MCNC: 3.09 MG/DL (ref 0.6–1.3)
EST. AVERAGE GLUCOSE BLD GHB EST-MCNC: 114 MG/DL
GFR SERPL CREATININE-BSD FRML MDRD: 19 ML/MIN/1.73SQ M
GLUCOSE P FAST SERPL-MCNC: 104 MG/DL (ref 65–99)
GLUCOSE UR STRIP-MCNC: NEGATIVE MG/DL
HBA1C MFR BLD: 5.6 %
HDLC SERPL-MCNC: 48 MG/DL
HGB UR QL STRIP.AUTO: NEGATIVE
KETONES UR STRIP-MCNC: NEGATIVE MG/DL
LDLC SERPL CALC-MCNC: 86 MG/DL (ref 0–100)
LEUKOCYTE ESTERASE UR QL STRIP: NEGATIVE
NITRITE UR QL STRIP: NEGATIVE
NON-SQ EPI CELLS URNS QL MICRO: NORMAL /HPF
NONHDLC SERPL-MCNC: 114 MG/DL
PH UR STRIP.AUTO: 6 [PH]
POTASSIUM SERPL-SCNC: 4.9 MMOL/L (ref 3.5–5.3)
PROT SERPL-MCNC: 6.8 G/DL (ref 6.4–8.2)
PROT UR STRIP-MCNC: ABNORMAL MG/DL
RBC #/AREA URNS AUTO: NORMAL /HPF
SODIUM SERPL-SCNC: 144 MMOL/L (ref 136–145)
SP GR UR STRIP.AUTO: 1.01 (ref 1–1.03)
TRIGL SERPL-MCNC: 141 MG/DL
UROBILINOGEN UR QL STRIP.AUTO: 0.2 E.U./DL
WBC #/AREA URNS AUTO: NORMAL /HPF

## 2020-10-01 PROCEDURE — 80053 COMPREHEN METABOLIC PANEL: CPT

## 2020-10-01 PROCEDURE — 36415 COLL VENOUS BLD VENIPUNCTURE: CPT

## 2020-10-01 PROCEDURE — 81001 URINALYSIS AUTO W/SCOPE: CPT

## 2020-10-01 PROCEDURE — 83036 HEMOGLOBIN GLYCOSYLATED A1C: CPT

## 2020-10-01 PROCEDURE — 80061 LIPID PANEL: CPT

## 2020-10-05 ENCOUNTER — OFFICE VISIT (OUTPATIENT)
Dept: FAMILY MEDICINE CLINIC | Facility: CLINIC | Age: 75
End: 2020-10-05
Payer: COMMERCIAL

## 2020-10-05 VITALS
HEIGHT: 67 IN | HEART RATE: 76 BPM | TEMPERATURE: 97.8 F | DIASTOLIC BLOOD PRESSURE: 72 MMHG | BODY MASS INDEX: 21.66 KG/M2 | WEIGHT: 138 LBS | SYSTOLIC BLOOD PRESSURE: 164 MMHG

## 2020-10-05 DIAGNOSIS — E11.69 HYPERLIPIDEMIA ASSOCIATED WITH TYPE 2 DIABETES MELLITUS (HCC): Primary | ICD-10-CM

## 2020-10-05 DIAGNOSIS — E78.5 HYPERLIPIDEMIA ASSOCIATED WITH TYPE 2 DIABETES MELLITUS (HCC): Primary | ICD-10-CM

## 2020-10-05 DIAGNOSIS — I10 ESSENTIAL HYPERTENSION: ICD-10-CM

## 2020-10-05 DIAGNOSIS — E11.22 TYPE 2 DIABETES MELLITUS WITH STAGE 4 CHRONIC KIDNEY DISEASE, WITHOUT LONG-TERM CURRENT USE OF INSULIN (HCC): ICD-10-CM

## 2020-10-05 DIAGNOSIS — E55.9 VITAMIN D DEFICIENCY: ICD-10-CM

## 2020-10-05 DIAGNOSIS — N18.4 STAGE 4 CHRONIC KIDNEY DISEASE (HCC): ICD-10-CM

## 2020-10-05 DIAGNOSIS — I25.10 CORONARY ARTERY DISEASE INVOLVING NATIVE CORONARY ARTERY OF NATIVE HEART WITHOUT ANGINA PECTORIS: ICD-10-CM

## 2020-10-05 DIAGNOSIS — J43.9 PULMONARY EMPHYSEMA, UNSPECIFIED EMPHYSEMA TYPE (HCC): ICD-10-CM

## 2020-10-05 DIAGNOSIS — N18.4 TYPE 2 DIABETES MELLITUS WITH STAGE 4 CHRONIC KIDNEY DISEASE, WITHOUT LONG-TERM CURRENT USE OF INSULIN (HCC): ICD-10-CM

## 2020-10-05 DIAGNOSIS — H35.033 HYPERTENSIVE RETINOPATHY, BILATERAL: ICD-10-CM

## 2020-10-05 PROBLEM — H25.13 AGE-RELATED NUCLEAR CATARACT, BILATERAL: Status: ACTIVE | Noted: 2020-07-13

## 2020-10-05 PROBLEM — N17.9 AKI (ACUTE KIDNEY INJURY) (HCC): Status: RESOLVED | Noted: 2017-09-07 | Resolved: 2020-10-05

## 2020-10-05 PROCEDURE — 99214 OFFICE O/P EST MOD 30 MIN: CPT | Performed by: FAMILY MEDICINE

## 2020-10-05 RX ORDER — A/SINGAPORE/GP1908/2015 IVR-180 (AN A/MICHIGAN/45/2015 (H1N1)PDM09-LIKE VIRUS, A/HONG KONG/4801/2014, NYMC X-263B (H3N2) (AN A/HONG KONG/4801/2014-LIKE VIRUS), AND B/BRISBANE/60/2008, WILD TYPE (A B/BRISBANE/60/2008-LIKE VIRUS) 15; 15; 15 UG/.5ML; UG/.5ML; UG/.5ML
INJECTION, SUSPENSION INTRAMUSCULAR
COMMUNITY
Start: 2020-09-16 | End: 2021-01-01 | Stop reason: ALTCHOICE

## 2020-10-05 RX ORDER — OXYCODONE HYDROCHLORIDE 10 MG/1
10 TABLET ORAL EVERY 6 HOURS PRN
COMMUNITY
Start: 2020-09-14 | End: 2021-01-28 | Stop reason: ALTCHOICE

## 2020-11-06 ENCOUNTER — OFFICE VISIT (OUTPATIENT)
Dept: ENDOCRINOLOGY | Facility: CLINIC | Age: 75
End: 2020-11-06
Payer: COMMERCIAL

## 2020-11-06 VITALS
HEIGHT: 67 IN | DIASTOLIC BLOOD PRESSURE: 80 MMHG | TEMPERATURE: 98 F | BODY MASS INDEX: 22.13 KG/M2 | WEIGHT: 141 LBS | SYSTOLIC BLOOD PRESSURE: 150 MMHG | HEART RATE: 60 BPM

## 2020-11-06 DIAGNOSIS — M81.8 OTHER OSTEOPOROSIS WITHOUT CURRENT PATHOLOGICAL FRACTURE: ICD-10-CM

## 2020-11-06 DIAGNOSIS — G47.33 OSA (OBSTRUCTIVE SLEEP APNEA): ICD-10-CM

## 2020-11-06 DIAGNOSIS — E55.9 VITAMIN D DEFICIENCY: ICD-10-CM

## 2020-11-06 DIAGNOSIS — J30.1 HAY FEVER: ICD-10-CM

## 2020-11-06 DIAGNOSIS — E27.49 SECONDARY ADRENAL INSUFFICIENCY (HCC): Primary | ICD-10-CM

## 2020-11-06 DIAGNOSIS — L20.9 ATOPIC DERMATITIS, UNSPECIFIED TYPE: ICD-10-CM

## 2020-11-06 DIAGNOSIS — E21.3 HYPERPARATHYROIDISM (HCC): ICD-10-CM

## 2020-11-06 PROCEDURE — 99214 OFFICE O/P EST MOD 30 MIN: CPT | Performed by: INTERNAL MEDICINE

## 2020-11-06 RX ORDER — DICLOFENAC SODIUM 1 MG/ML
SOLUTION/ DROPS OPHTHALMIC
Qty: 5 ML | Refills: 0 | Status: SHIPPED | OUTPATIENT
Start: 2020-11-06 | End: 2020-11-10

## 2020-11-06 RX ORDER — PREDNISONE 1 MG/1
TABLET ORAL
Qty: 200 TABLET | Refills: 3 | Status: SHIPPED | OUTPATIENT
Start: 2020-11-06 | End: 2021-05-07 | Stop reason: SDUPTHER

## 2020-11-06 RX ORDER — VIT C/E/ZN/COPPR/LUTEIN/ZEAXAN 60 MG-6 MG
1 CAPSULE ORAL DAILY
COMMUNITY
Start: 2020-10-13

## 2020-11-06 RX ORDER — KETOROLAC TROMETHAMINE 4 MG/ML
1 SOLUTION/ DROPS OPHTHALMIC 4 TIMES DAILY
Qty: 1 BOTTLE | Refills: 0 | Status: SHIPPED | OUTPATIENT
Start: 2020-11-06 | End: 2020-11-06

## 2020-11-10 ENCOUNTER — TELEPHONE (OUTPATIENT)
Dept: ENDOCRINOLOGY | Facility: CLINIC | Age: 75
End: 2020-11-10

## 2020-11-10 DIAGNOSIS — J30.1 HAY FEVER: ICD-10-CM

## 2020-11-10 RX ORDER — DICLOFENAC SODIUM 1 MG/ML
SOLUTION/ DROPS OPHTHALMIC
Qty: 5 ML | Refills: 0 | Status: SHIPPED | OUTPATIENT
Start: 2020-11-10 | End: 2021-04-12 | Stop reason: ALTCHOICE

## 2020-11-10 RX ORDER — DICLOFENAC SODIUM 1 MG/ML
SOLUTION/ DROPS OPHTHALMIC
Qty: 5 ML | Refills: 0 | Status: SHIPPED | OUTPATIENT
Start: 2020-11-10 | End: 2020-11-10

## 2020-12-07 ENCOUNTER — LAB (OUTPATIENT)
Dept: LAB | Facility: CLINIC | Age: 75
End: 2020-12-07
Payer: COMMERCIAL

## 2020-12-07 ENCOUNTER — TRANSCRIBE ORDERS (OUTPATIENT)
Dept: LAB | Facility: CLINIC | Age: 75
End: 2020-12-07

## 2020-12-07 DIAGNOSIS — N18.4 CHRONIC KIDNEY DISEASE, STAGE IV (SEVERE) (HCC): ICD-10-CM

## 2020-12-07 DIAGNOSIS — D50.8 OTHER IRON DEFICIENCY ANEMIA: ICD-10-CM

## 2020-12-07 DIAGNOSIS — N18.9 CHRONIC RENAL FAILURE IN PEDIATRIC PATIENT, UNSPECIFIED STAGE: ICD-10-CM

## 2020-12-07 DIAGNOSIS — N25.81 SECONDARY HYPERPARATHYROIDISM OF RENAL ORIGIN (HCC): ICD-10-CM

## 2020-12-07 DIAGNOSIS — N25.81 SECONDARY HYPERPARATHYROIDISM OF RENAL ORIGIN (HCC): Primary | ICD-10-CM

## 2020-12-07 DIAGNOSIS — I12.9 PARENCHYMAL RENAL HYPERTENSION, STAGE 1 THROUGH STAGE 4 OR UNSPECIFIED CHRONIC KIDNEY DISEASE: ICD-10-CM

## 2020-12-07 LAB
25(OH)D3 SERPL-MCNC: 49.3 NG/ML (ref 30–100)
ANION GAP SERPL CALCULATED.3IONS-SCNC: 3 MMOL/L (ref 4–13)
BASOPHILS # BLD AUTO: 0.09 THOUSANDS/ΜL (ref 0–0.1)
BASOPHILS NFR BLD AUTO: 1 % (ref 0–1)
BUN SERPL-MCNC: 78 MG/DL (ref 5–25)
CALCIUM SERPL-MCNC: 9.5 MG/DL (ref 8.3–10.1)
CHLORIDE SERPL-SCNC: 116 MMOL/L (ref 100–108)
CO2 SERPL-SCNC: 24 MMOL/L (ref 21–32)
CREAT SERPL-MCNC: 2.92 MG/DL (ref 0.6–1.3)
CREAT UR-MCNC: 75.7 MG/DL
EOSINOPHIL # BLD AUTO: 0.59 THOUSAND/ΜL (ref 0–0.61)
EOSINOPHIL NFR BLD AUTO: 7 % (ref 0–6)
ERYTHROCYTE [DISTWIDTH] IN BLOOD BY AUTOMATED COUNT: 16.5 % (ref 11.6–15.1)
FERRITIN SERPL-MCNC: 28 NG/ML (ref 8–388)
GFR SERPL CREATININE-BSD FRML MDRD: 20 ML/MIN/1.73SQ M
GLUCOSE P FAST SERPL-MCNC: 119 MG/DL (ref 65–99)
HCT VFR BLD AUTO: 33.4 % (ref 36.5–49.3)
HGB BLD-MCNC: 10.2 G/DL (ref 12–17)
IMM GRANULOCYTES # BLD AUTO: 0.02 THOUSAND/UL (ref 0–0.2)
IMM GRANULOCYTES NFR BLD AUTO: 0 % (ref 0–2)
IRON SATN MFR SERPL: 14 %
IRON SERPL-MCNC: 38 UG/DL (ref 65–175)
LYMPHOCYTES # BLD AUTO: 1.42 THOUSANDS/ΜL (ref 0.6–4.47)
LYMPHOCYTES NFR BLD AUTO: 17 % (ref 14–44)
MCH RBC QN AUTO: 29.5 PG (ref 26.8–34.3)
MCHC RBC AUTO-ENTMCNC: 30.5 G/DL (ref 31.4–37.4)
MCV RBC AUTO: 97 FL (ref 82–98)
MONOCYTES # BLD AUTO: 0.75 THOUSAND/ΜL (ref 0.17–1.22)
MONOCYTES NFR BLD AUTO: 9 % (ref 4–12)
NEUTROPHILS # BLD AUTO: 5.75 THOUSANDS/ΜL (ref 1.85–7.62)
NEUTS SEG NFR BLD AUTO: 66 % (ref 43–75)
NRBC BLD AUTO-RTO: 0 /100 WBCS
PHOSPHATE SERPL-MCNC: 3.6 MG/DL (ref 2.3–4.1)
PLATELET # BLD AUTO: 327 THOUSANDS/UL (ref 149–390)
PMV BLD AUTO: 10.7 FL (ref 8.9–12.7)
POTASSIUM SERPL-SCNC: 5.3 MMOL/L (ref 3.5–5.3)
PROT UR-MCNC: 39 MG/DL
PROT/CREAT UR: 0.52 MG/G{CREAT} (ref 0–0.1)
PTH-INTACT SERPL-MCNC: 95.4 PG/ML (ref 18.4–80.1)
RBC # BLD AUTO: 3.46 MILLION/UL (ref 3.88–5.62)
SODIUM SERPL-SCNC: 143 MMOL/L (ref 136–145)
TIBC SERPL-MCNC: 266 UG/DL (ref 250–450)
WBC # BLD AUTO: 8.62 THOUSAND/UL (ref 4.31–10.16)

## 2020-12-07 PROCEDURE — 82728 ASSAY OF FERRITIN: CPT

## 2020-12-07 PROCEDURE — 84156 ASSAY OF PROTEIN URINE: CPT

## 2020-12-07 PROCEDURE — 83970 ASSAY OF PARATHORMONE: CPT

## 2020-12-07 PROCEDURE — 80048 BASIC METABOLIC PNL TOTAL CA: CPT

## 2020-12-07 PROCEDURE — 36415 COLL VENOUS BLD VENIPUNCTURE: CPT

## 2020-12-07 PROCEDURE — 85025 COMPLETE CBC W/AUTO DIFF WBC: CPT

## 2020-12-07 PROCEDURE — 84100 ASSAY OF PHOSPHORUS: CPT

## 2020-12-07 PROCEDURE — 83540 ASSAY OF IRON: CPT

## 2020-12-07 PROCEDURE — 83550 IRON BINDING TEST: CPT

## 2020-12-07 PROCEDURE — 82570 ASSAY OF URINE CREATININE: CPT

## 2020-12-07 PROCEDURE — 82306 VITAMIN D 25 HYDROXY: CPT

## 2020-12-14 DIAGNOSIS — G25.81 RESTLESS LEGS SYNDROME (RLS): Primary | ICD-10-CM

## 2020-12-23 DIAGNOSIS — F33.0 MILD EPISODE OF RECURRENT MAJOR DEPRESSIVE DISORDER (HCC): ICD-10-CM

## 2020-12-23 DIAGNOSIS — F41.9 ANXIETY: ICD-10-CM

## 2020-12-23 RX ORDER — MIRTAZAPINE 30 MG/1
30 TABLET, FILM COATED ORAL
Qty: 90 TABLET | Refills: 0 | Status: SHIPPED | OUTPATIENT
Start: 2020-12-23 | End: 2021-01-28 | Stop reason: ALTCHOICE

## 2021-01-01 ENCOUNTER — APPOINTMENT (OUTPATIENT)
Dept: LAB | Facility: CLINIC | Age: 76
End: 2021-01-01
Payer: COMMERCIAL

## 2021-01-01 ENCOUNTER — TELEPHONE (OUTPATIENT)
Dept: PAIN MEDICINE | Facility: MEDICAL CENTER | Age: 76
End: 2021-01-01

## 2021-01-01 ENCOUNTER — CONSULT (OUTPATIENT)
Dept: CARDIOLOGY CLINIC | Facility: CLINIC | Age: 76
End: 2021-01-01
Payer: COMMERCIAL

## 2021-01-01 ENCOUNTER — OFFICE VISIT (OUTPATIENT)
Dept: PAIN MEDICINE | Facility: MEDICAL CENTER | Age: 76
End: 2021-01-01
Payer: COMMERCIAL

## 2021-01-01 ENCOUNTER — TELEPHONE (OUTPATIENT)
Dept: ENDOCRINOLOGY | Facility: CLINIC | Age: 76
End: 2021-01-01

## 2021-01-01 ENCOUNTER — OFFICE VISIT (OUTPATIENT)
Dept: ENDOCRINOLOGY | Facility: CLINIC | Age: 76
End: 2021-01-01
Payer: COMMERCIAL

## 2021-01-01 ENCOUNTER — CONSULT (OUTPATIENT)
Dept: PAIN MEDICINE | Facility: MEDICAL CENTER | Age: 76
End: 2021-01-01
Payer: COMMERCIAL

## 2021-01-01 ENCOUNTER — HOSPITAL ENCOUNTER (OUTPATIENT)
Dept: BONE DENSITY | Facility: MEDICAL CENTER | Age: 76
Discharge: HOME | End: 2021-05-17
Payer: COMMERCIAL

## 2021-01-01 ENCOUNTER — TELEPHONE (OUTPATIENT)
Dept: PAIN MEDICINE | Facility: CLINIC | Age: 76
End: 2021-01-01

## 2021-01-01 ENCOUNTER — TELEPHONE (OUTPATIENT)
Dept: FAMILY MEDICINE CLINIC | Facility: CLINIC | Age: 76
End: 2021-01-01

## 2021-01-01 ENCOUNTER — HOSPITAL ENCOUNTER (OUTPATIENT)
Dept: RADIOLOGY | Facility: MEDICAL CENTER | Age: 76
Discharge: HOME | End: 2021-11-18
Attending: PHYSICAL MEDICINE & REHABILITATION
Payer: COMMERCIAL

## 2021-01-01 ENCOUNTER — OFFICE VISIT (OUTPATIENT)
Dept: FAMILY MEDICINE CLINIC | Facility: CLINIC | Age: 76
End: 2021-01-01
Payer: COMMERCIAL

## 2021-01-01 ENCOUNTER — TELEPHONE (OUTPATIENT)
Dept: ADMINISTRATIVE | Facility: OTHER | Age: 76
End: 2021-01-01

## 2021-01-01 ENCOUNTER — HOSPITAL ENCOUNTER (OUTPATIENT)
Dept: RADIOLOGY | Facility: MEDICAL CENTER | Age: 76
Discharge: HOME | End: 2021-09-16
Attending: PHYSICAL MEDICINE & REHABILITATION
Payer: COMMERCIAL

## 2021-01-01 ENCOUNTER — OFFICE VISIT (OUTPATIENT)
Dept: CARDIOLOGY CLINIC | Facility: CLINIC | Age: 76
End: 2021-01-01
Payer: COMMERCIAL

## 2021-01-01 ENCOUNTER — TELEPHONE (OUTPATIENT)
Dept: NEUROLOGY | Facility: CLINIC | Age: 76
End: 2021-01-01

## 2021-01-01 ENCOUNTER — IMMUNIZATIONS (OUTPATIENT)
Dept: FAMILY MEDICINE CLINIC | Facility: HOSPITAL | Age: 76
End: 2021-01-01

## 2021-01-01 ENCOUNTER — TELEPHONE (OUTPATIENT)
Dept: RADIOLOGY | Facility: MEDICAL CENTER | Age: 76
End: 2021-01-01

## 2021-01-01 ENCOUNTER — CLINICAL SUPPORT (OUTPATIENT)
Dept: ENDOCRINOLOGY | Facility: CLINIC | Age: 76
End: 2021-01-01
Payer: COMMERCIAL

## 2021-01-01 VITALS
SYSTOLIC BLOOD PRESSURE: 134 MMHG | HEART RATE: 74 BPM | HEIGHT: 67 IN | DIASTOLIC BLOOD PRESSURE: 60 MMHG | BODY MASS INDEX: 22.98 KG/M2 | WEIGHT: 146.38 LBS

## 2021-01-01 VITALS
DIASTOLIC BLOOD PRESSURE: 74 MMHG | WEIGHT: 141 LBS | TEMPERATURE: 97.7 F | HEART RATE: 70 BPM | HEIGHT: 67 IN | SYSTOLIC BLOOD PRESSURE: 142 MMHG | BODY MASS INDEX: 22.13 KG/M2

## 2021-01-01 VITALS
WEIGHT: 141 LBS | TEMPERATURE: 97.9 F | HEIGHT: 67 IN | BODY MASS INDEX: 22.13 KG/M2 | HEART RATE: 67 BPM | DIASTOLIC BLOOD PRESSURE: 60 MMHG | SYSTOLIC BLOOD PRESSURE: 136 MMHG

## 2021-01-01 VITALS
OXYGEN SATURATION: 98 % | SYSTOLIC BLOOD PRESSURE: 170 MMHG | DIASTOLIC BLOOD PRESSURE: 72 MMHG | HEART RATE: 72 BPM | TEMPERATURE: 97.6 F | RESPIRATION RATE: 20 BRPM

## 2021-01-01 VITALS
WEIGHT: 143 LBS | SYSTOLIC BLOOD PRESSURE: 148 MMHG | RESPIRATION RATE: 16 BRPM | DIASTOLIC BLOOD PRESSURE: 70 MMHG | HEIGHT: 67 IN | BODY MASS INDEX: 22.44 KG/M2 | HEART RATE: 84 BPM

## 2021-01-01 VITALS
HEIGHT: 67 IN | HEART RATE: 70 BPM | BODY MASS INDEX: 23.79 KG/M2 | DIASTOLIC BLOOD PRESSURE: 90 MMHG | SYSTOLIC BLOOD PRESSURE: 210 MMHG | WEIGHT: 151.6 LBS

## 2021-01-01 VITALS
WEIGHT: 145 LBS | HEIGHT: 67 IN | BODY MASS INDEX: 22.76 KG/M2 | DIASTOLIC BLOOD PRESSURE: 74 MMHG | SYSTOLIC BLOOD PRESSURE: 177 MMHG | HEART RATE: 76 BPM

## 2021-01-01 VITALS
DIASTOLIC BLOOD PRESSURE: 70 MMHG | WEIGHT: 138.2 LBS | BODY MASS INDEX: 21.65 KG/M2 | HEART RATE: 62 BPM | SYSTOLIC BLOOD PRESSURE: 140 MMHG

## 2021-01-01 VITALS
DIASTOLIC BLOOD PRESSURE: 82 MMHG | HEART RATE: 76 BPM | TEMPERATURE: 98.1 F | HEIGHT: 67 IN | SYSTOLIC BLOOD PRESSURE: 168 MMHG | BODY MASS INDEX: 21.52 KG/M2 | WEIGHT: 137.13 LBS

## 2021-01-01 VITALS
DIASTOLIC BLOOD PRESSURE: 78 MMHG | BODY MASS INDEX: 22.44 KG/M2 | TEMPERATURE: 97.8 F | WEIGHT: 143 LBS | HEIGHT: 67 IN | SYSTOLIC BLOOD PRESSURE: 180 MMHG | HEART RATE: 87 BPM

## 2021-01-01 VITALS
TEMPERATURE: 98 F | SYSTOLIC BLOOD PRESSURE: 152 MMHG | OXYGEN SATURATION: 95 % | RESPIRATION RATE: 20 BRPM | DIASTOLIC BLOOD PRESSURE: 64 MMHG | HEART RATE: 71 BPM

## 2021-01-01 DIAGNOSIS — I25.10 CORONARY ARTERY DISEASE INVOLVING NATIVE CORONARY ARTERY OF NATIVE HEART WITHOUT ANGINA PECTORIS: Primary | ICD-10-CM

## 2021-01-01 DIAGNOSIS — M50.120 CERVICAL DISC DISORDER WITH RADICULOPATHY OF MID-CERVICAL REGION: ICD-10-CM

## 2021-01-01 DIAGNOSIS — E27.49 SECONDARY ADRENAL INSUFFICIENCY (HCC): Primary | ICD-10-CM

## 2021-01-01 DIAGNOSIS — E55.9 AVITAMINOSIS D: ICD-10-CM

## 2021-01-01 DIAGNOSIS — M48.02 NEURAL FORAMINAL STENOSIS OF CERVICAL SPINE: ICD-10-CM

## 2021-01-01 DIAGNOSIS — J43.9 PULMONARY EMPHYSEMA, UNSPECIFIED EMPHYSEMA TYPE (HCC): ICD-10-CM

## 2021-01-01 DIAGNOSIS — Z72.0 TOBACCO ABUSE: ICD-10-CM

## 2021-01-01 DIAGNOSIS — J44.1 CHRONIC OBSTRUCTIVE PULMONARY DISEASE WITH ACUTE EXACERBATION (HCC): ICD-10-CM

## 2021-01-01 DIAGNOSIS — M81.0 OSTEOPOROSIS WITHOUT CURRENT PATHOLOGICAL FRACTURE, UNSPECIFIED OSTEOPOROSIS TYPE: ICD-10-CM

## 2021-01-01 DIAGNOSIS — I10 ESSENTIAL HYPERTENSION: ICD-10-CM

## 2021-01-01 DIAGNOSIS — G89.4 CHRONIC PAIN SYNDROME: ICD-10-CM

## 2021-01-01 DIAGNOSIS — M54.2 NECK PAIN: ICD-10-CM

## 2021-01-01 DIAGNOSIS — N18.30 STAGE 3 CHRONIC KIDNEY DISEASE, UNSPECIFIED WHETHER STAGE 3A OR 3B CKD (HCC): ICD-10-CM

## 2021-01-01 DIAGNOSIS — G47.33 OSA (OBSTRUCTIVE SLEEP APNEA): ICD-10-CM

## 2021-01-01 DIAGNOSIS — M48.02 FORAMINAL STENOSIS OF CERVICAL REGION: ICD-10-CM

## 2021-01-01 DIAGNOSIS — E11.69 HYPERLIPIDEMIA ASSOCIATED WITH TYPE 2 DIABETES MELLITUS (HCC): ICD-10-CM

## 2021-01-01 DIAGNOSIS — N18.4 CHRONIC KIDNEY DISEASE, STAGE IV (SEVERE) (HCC): ICD-10-CM

## 2021-01-01 DIAGNOSIS — N18.4 STAGE 4 CHRONIC KIDNEY DISEASE (HCC): ICD-10-CM

## 2021-01-01 DIAGNOSIS — E55.9 VITAMIN D DEFICIENCY: ICD-10-CM

## 2021-01-01 DIAGNOSIS — G25.81 RESTLESS LEGS SYNDROME (RLS): ICD-10-CM

## 2021-01-01 DIAGNOSIS — G89.4 CHRONIC PAIN SYNDROME: Primary | ICD-10-CM

## 2021-01-01 DIAGNOSIS — D50.8 OTHER IRON DEFICIENCY ANEMIA: ICD-10-CM

## 2021-01-01 DIAGNOSIS — N25.81 SECONDARY HYPERPARATHYROIDISM (HCC): ICD-10-CM

## 2021-01-01 DIAGNOSIS — F33.0 MILD EPISODE OF RECURRENT MAJOR DEPRESSIVE DISORDER (HCC): ICD-10-CM

## 2021-01-01 DIAGNOSIS — D63.8 ANEMIA OF CHRONIC DISEASE: ICD-10-CM

## 2021-01-01 DIAGNOSIS — M81.0 AGE-RELATED OSTEOPOROSIS WITHOUT CURRENT PATHOLOGICAL FRACTURE: ICD-10-CM

## 2021-01-01 DIAGNOSIS — I21.4 NSTEMI (NON-ST ELEVATED MYOCARDIAL INFARCTION) (HCC): ICD-10-CM

## 2021-01-01 DIAGNOSIS — N18.4 TYPE 2 DIABETES MELLITUS WITH STAGE 4 CHRONIC KIDNEY DISEASE, WITHOUT LONG-TERM CURRENT USE OF INSULIN (HCC): ICD-10-CM

## 2021-01-01 DIAGNOSIS — J44.1 CHRONIC OBSTRUCTIVE PULMONARY DISEASE WITH ACUTE EXACERBATION (HCC): Primary | ICD-10-CM

## 2021-01-01 DIAGNOSIS — I25.10 CORONARY ARTERY DISEASE INVOLVING NATIVE CORONARY ARTERY OF NATIVE HEART WITHOUT ANGINA PECTORIS: ICD-10-CM

## 2021-01-01 DIAGNOSIS — E78.00 PURE HYPERCHOLESTEROLEMIA: ICD-10-CM

## 2021-01-01 DIAGNOSIS — E21.3 HYPERPARATHYROIDISM (HCC): ICD-10-CM

## 2021-01-01 DIAGNOSIS — Z98.1 HISTORY OF FUSION OF CERVICAL SPINE: ICD-10-CM

## 2021-01-01 DIAGNOSIS — I10 PRIMARY HYPERTENSION: ICD-10-CM

## 2021-01-01 DIAGNOSIS — E78.5 HYPERLIPIDEMIA ASSOCIATED WITH TYPE 2 DIABETES MELLITUS (HCC): ICD-10-CM

## 2021-01-01 DIAGNOSIS — L20.9 ATOPIC DERMATITIS, UNSPECIFIED TYPE: ICD-10-CM

## 2021-01-01 DIAGNOSIS — G89.4 CHRONIC PAIN DISORDER: ICD-10-CM

## 2021-01-01 DIAGNOSIS — Z23 ENCOUNTER FOR IMMUNIZATION: Primary | ICD-10-CM

## 2021-01-01 DIAGNOSIS — C61 MALIGNANT NEOPLASM OF PROSTATE (HCC): ICD-10-CM

## 2021-01-01 DIAGNOSIS — M50.00 CERVICAL DISC DISEASE WITH MYELOPATHY: ICD-10-CM

## 2021-01-01 DIAGNOSIS — E27.49 SECONDARY ADRENAL INSUFFICIENCY (HCC): ICD-10-CM

## 2021-01-01 DIAGNOSIS — M81.8 OTHER OSTEOPOROSIS WITHOUT CURRENT PATHOLOGICAL FRACTURE: ICD-10-CM

## 2021-01-01 DIAGNOSIS — I25.2 HISTORY OF NON-ST ELEVATION MYOCARDIAL INFARCTION (NSTEMI): ICD-10-CM

## 2021-01-01 DIAGNOSIS — M51.36 DDD (DEGENERATIVE DISC DISEASE), LUMBAR: ICD-10-CM

## 2021-01-01 DIAGNOSIS — N18.4 CHRONIC KIDNEY DISEASE, STAGE 4 (SEVERE) (HCC): ICD-10-CM

## 2021-01-01 DIAGNOSIS — D50.9 IRON DEFICIENCY ANEMIA, UNSPECIFIED IRON DEFICIENCY ANEMIA TYPE: ICD-10-CM

## 2021-01-01 DIAGNOSIS — E11.22 TYPE 2 DIABETES MELLITUS WITH STAGE 4 CHRONIC KIDNEY DISEASE, WITHOUT LONG-TERM CURRENT USE OF INSULIN (HCC): ICD-10-CM

## 2021-01-01 DIAGNOSIS — I12.0 PARENCHYMAL RENAL HYPERTENSION, STAGE 5 CHRONIC KIDNEY DISEASE OR END STAGE RENAL DISEASE (HCC): ICD-10-CM

## 2021-01-01 DIAGNOSIS — N18.4 STAGE 4 CHRONIC KIDNEY DISEASE (HCC): Primary | ICD-10-CM

## 2021-01-01 DIAGNOSIS — N25.81 SECONDARY HYPERPARATHYROIDISM OF RENAL ORIGIN (HCC): ICD-10-CM

## 2021-01-01 LAB
ANION GAP SERPL CALCULATED.3IONS-SCNC: 8 MMOL/L (ref 4–13)
BASOPHILS # BLD AUTO: 0.11 THOUSANDS/ΜL (ref 0–0.1)
BASOPHILS NFR BLD AUTO: 1 % (ref 0–1)
BUN SERPL-MCNC: 48 MG/DL (ref 5–25)
CALCIUM SERPL-MCNC: 8.7 MG/DL (ref 8.3–10.1)
CHLORIDE SERPL-SCNC: 112 MMOL/L (ref 100–108)
CO2 SERPL-SCNC: 21 MMOL/L (ref 21–32)
CREAT SERPL-MCNC: 3.31 MG/DL (ref 0.6–1.3)
EOSINOPHIL # BLD AUTO: 0.3 THOUSAND/ΜL (ref 0–0.61)
EOSINOPHIL NFR BLD AUTO: 3 % (ref 0–6)
ERYTHROCYTE [DISTWIDTH] IN BLOOD BY AUTOMATED COUNT: 17 % (ref 11.6–15.1)
FERRITIN SERPL-MCNC: 21 NG/ML (ref 8–388)
GFR SERPL CREATININE-BSD FRML MDRD: 17 ML/MIN/1.73SQ M
GLUCOSE P FAST SERPL-MCNC: 253 MG/DL (ref 65–99)
HCT VFR BLD AUTO: 35.8 % (ref 36.5–49.3)
HGB BLD-MCNC: 10.8 G/DL (ref 12–17)
IMM GRANULOCYTES # BLD AUTO: 0.06 THOUSAND/UL (ref 0–0.2)
IMM GRANULOCYTES NFR BLD AUTO: 1 % (ref 0–2)
LEFT EYE DIABETIC RETINOPATHY: NORMAL
LYMPHOCYTES # BLD AUTO: 1.28 THOUSANDS/ΜL (ref 0.6–4.47)
LYMPHOCYTES NFR BLD AUTO: 14 % (ref 14–44)
MCH RBC QN AUTO: 28.9 PG (ref 26.8–34.3)
MCHC RBC AUTO-ENTMCNC: 30.2 G/DL (ref 31.4–37.4)
MCV RBC AUTO: 96 FL (ref 82–98)
MONOCYTES # BLD AUTO: 0.82 THOUSAND/ΜL (ref 0.17–1.22)
MONOCYTES NFR BLD AUTO: 9 % (ref 4–12)
NEUTROPHILS # BLD AUTO: 6.73 THOUSANDS/ΜL (ref 1.85–7.62)
NEUTS SEG NFR BLD AUTO: 72 % (ref 43–75)
NRBC BLD AUTO-RTO: 0 /100 WBCS
PLATELET # BLD AUTO: 363 THOUSANDS/UL (ref 149–390)
PMV BLD AUTO: 10.8 FL (ref 8.9–12.7)
POTASSIUM SERPL-SCNC: 4.7 MMOL/L (ref 3.5–5.3)
PSA FREE MFR SERPL: 5 %
PSA FREE SERPL-MCNC: 0.01 NG/ML
PSA SERPL-MCNC: 0.2 NG/ML (ref 0–4)
PTH-INTACT SERPL-MCNC: 332.2 PG/ML (ref 18.4–80.1)
RBC # BLD AUTO: 3.74 MILLION/UL (ref 3.88–5.62)
RIGHT EYE DIABETIC RETINOPATHY: NORMAL
SODIUM SERPL-SCNC: 141 MMOL/L (ref 136–145)
WBC # BLD AUTO: 9.3 THOUSAND/UL (ref 4.31–10.16)

## 2021-01-01 PROCEDURE — 3066F NEPHROPATHY DOC TX: CPT | Performed by: INTERNAL MEDICINE

## 2021-01-01 PROCEDURE — 0064A COVID-19 MODERNA VACC 0.25 ML BOOSTER: CPT

## 2021-01-01 PROCEDURE — 99214 OFFICE O/P EST MOD 30 MIN: CPT | Performed by: FAMILY MEDICINE

## 2021-01-01 PROCEDURE — 3078F DIAST BP <80 MM HG: CPT | Performed by: INTERNAL MEDICINE

## 2021-01-01 PROCEDURE — 62321 NJX INTERLAMINAR CRV/THRC: CPT | Performed by: PHYSICAL MEDICINE & REHABILITATION

## 2021-01-01 PROCEDURE — 99214 OFFICE O/P EST MOD 30 MIN: CPT | Performed by: INTERNAL MEDICINE

## 2021-01-01 PROCEDURE — 82728 ASSAY OF FERRITIN: CPT

## 2021-01-01 PROCEDURE — 3077F SYST BP >= 140 MM HG: CPT | Performed by: INTERNAL MEDICINE

## 2021-01-01 PROCEDURE — 99204 OFFICE O/P NEW MOD 45 MIN: CPT | Performed by: PHYSICAL MEDICINE & REHABILITATION

## 2021-01-01 PROCEDURE — 80048 BASIC METABOLIC PNL TOTAL CA: CPT

## 2021-01-01 PROCEDURE — 93000 ELECTROCARDIOGRAM COMPLETE: CPT | Performed by: INTERNAL MEDICINE

## 2021-01-01 PROCEDURE — 36415 COLL VENOUS BLD VENIPUNCTURE: CPT

## 2021-01-01 PROCEDURE — 91306 COVID-19 MODERNA VACC 0.25 ML BOOSTER: CPT

## 2021-01-01 PROCEDURE — 96372 THER/PROPH/DIAG INJ SC/IM: CPT

## 2021-01-01 PROCEDURE — 99214 OFFICE O/P EST MOD 30 MIN: CPT | Performed by: NURSE PRACTITIONER

## 2021-01-01 PROCEDURE — 1160F RVW MEDS BY RX/DR IN RCRD: CPT | Performed by: INTERNAL MEDICINE

## 2021-01-01 PROCEDURE — 96372 THER/PROPH/DIAG INJ SC/IM: CPT | Performed by: INTERNAL MEDICINE

## 2021-01-01 PROCEDURE — G0439 PPPS, SUBSEQ VISIT: HCPCS | Performed by: FAMILY MEDICINE

## 2021-01-01 PROCEDURE — 77080 DXA BONE DENSITY AXIAL: CPT

## 2021-01-01 PROCEDURE — 84154 ASSAY OF PSA FREE: CPT

## 2021-01-01 PROCEDURE — 83970 ASSAY OF PARATHORMONE: CPT

## 2021-01-01 PROCEDURE — 84153 ASSAY OF PSA TOTAL: CPT

## 2021-01-01 PROCEDURE — 99203 OFFICE O/P NEW LOW 30 MIN: CPT | Performed by: INTERNAL MEDICINE

## 2021-01-01 PROCEDURE — 85025 COMPLETE CBC W/AUTO DIFF WBC: CPT

## 2021-01-01 PROCEDURE — 99214 OFFICE O/P EST MOD 30 MIN: CPT | Performed by: PHYSICAL MEDICINE & REHABILITATION

## 2021-01-01 RX ORDER — PREDNISONE 10 MG/1
10 TABLET ORAL DAILY
Qty: 30 TABLET | Refills: 5 | Status: SHIPPED | OUTPATIENT
Start: 2021-01-01 | End: 2021-01-01

## 2021-01-01 RX ORDER — CARVEDILOL 25 MG/1
25 TABLET ORAL 2 TIMES DAILY WITH MEALS
Qty: 180 TABLET | Refills: 3 | Status: SHIPPED | OUTPATIENT
Start: 2021-01-01

## 2021-01-01 RX ORDER — CALCITRIOL 0.25 UG/1
CAPSULE, LIQUID FILLED ORAL EVERY OTHER DAY
COMMUNITY
Start: 2021-01-01

## 2021-01-01 RX ORDER — PREDNISONE 1 MG/1
TABLET ORAL
Qty: 200 TABLET | Refills: 0 | OUTPATIENT
Start: 2021-01-01

## 2021-01-01 RX ORDER — PREDNISONE 10 MG/1
TABLET ORAL
Qty: 30 TABLET | Refills: 5 | Status: SHIPPED | OUTPATIENT
Start: 2021-01-01 | End: 2021-01-01 | Stop reason: SDUPTHER

## 2021-01-01 RX ORDER — CLOPIDOGREL BISULFATE 75 MG/1
75 TABLET ORAL DAILY
Qty: 90 TABLET | Refills: 3 | Status: SHIPPED | OUTPATIENT
Start: 2021-01-01

## 2021-01-01 RX ORDER — ALBUTEROL SULFATE 90 UG/1
AEROSOL, METERED RESPIRATORY (INHALATION)
COMMUNITY
Start: 2021-01-01

## 2021-01-01 RX ORDER — MIRTAZAPINE 30 MG/1
30 TABLET, FILM COATED ORAL
Qty: 90 TABLET | Refills: 1 | Status: SHIPPED | OUTPATIENT
Start: 2021-01-01 | End: 2022-01-01 | Stop reason: DRUGHIGH

## 2021-01-01 RX ORDER — METHYLPREDNISOLONE ACETATE 80 MG/ML
80 INJECTION, SUSPENSION INTRA-ARTICULAR; INTRALESIONAL; INTRAMUSCULAR; PARENTERAL; SOFT TISSUE ONCE
Status: COMPLETED | OUTPATIENT
Start: 2021-01-01 | End: 2021-01-01

## 2021-01-01 RX ORDER — PREDNISONE 10 MG/1
10 TABLET ORAL DAILY
Qty: 90 TABLET | Refills: 5 | Status: SHIPPED | OUTPATIENT
Start: 2021-01-01

## 2021-01-01 RX ORDER — ISOSORBIDE MONONITRATE 30 MG/1
30 TABLET, EXTENDED RELEASE ORAL DAILY
COMMUNITY
Start: 2021-01-01 | End: 2022-01-01 | Stop reason: SINTOL

## 2021-01-01 RX ORDER — CEFUROXIME AXETIL 500 MG/1
500 TABLET ORAL EVERY 12 HOURS SCHEDULED
Qty: 20 TABLET | Refills: 0 | Status: SHIPPED | OUTPATIENT
Start: 2021-01-01 | End: 2021-01-01

## 2021-01-01 RX ADMIN — IOHEXOL 1 ML: 300 INJECTION, SOLUTION INTRAVENOUS at 14:13

## 2021-01-01 RX ADMIN — METHYLPREDNISOLONE ACETATE 80 MG: 80 INJECTION, SUSPENSION INTRA-ARTICULAR; INTRALESIONAL; INTRAMUSCULAR; PARENTERAL; SOFT TISSUE at 14:13

## 2021-01-01 RX ADMIN — IOHEXOL 1 ML: 300 INJECTION, SOLUTION INTRAVENOUS at 09:52

## 2021-01-01 RX ADMIN — METHYLPREDNISOLONE ACETATE 80 MG: 80 INJECTION, SUSPENSION INTRA-ARTICULAR; INTRALESIONAL; INTRAMUSCULAR; PARENTERAL; SOFT TISSUE at 09:53

## 2021-01-05 ENCOUNTER — TRANSCRIBE ORDERS (OUTPATIENT)
Dept: ADMINISTRATIVE | Facility: HOSPITAL | Age: 76
End: 2021-01-05

## 2021-01-05 DIAGNOSIS — G25.81 RLS (RESTLESS LEGS SYNDROME): Primary | ICD-10-CM

## 2021-01-20 DIAGNOSIS — Z23 ENCOUNTER FOR IMMUNIZATION: ICD-10-CM

## 2021-01-28 ENCOUNTER — OFFICE VISIT (OUTPATIENT)
Dept: SLEEP CENTER | Facility: CLINIC | Age: 76
End: 2021-01-28
Payer: COMMERCIAL

## 2021-01-28 VITALS
BODY MASS INDEX: 22.32 KG/M2 | DIASTOLIC BLOOD PRESSURE: 90 MMHG | SYSTOLIC BLOOD PRESSURE: 150 MMHG | HEIGHT: 67 IN | WEIGHT: 142.2 LBS

## 2021-01-28 DIAGNOSIS — D50.8 OTHER IRON DEFICIENCY ANEMIA: Primary | ICD-10-CM

## 2021-01-28 DIAGNOSIS — G25.81 RLS (RESTLESS LEGS SYNDROME): ICD-10-CM

## 2021-01-28 PROCEDURE — 99214 OFFICE O/P EST MOD 30 MIN: CPT | Performed by: INTERNAL MEDICINE

## 2021-01-28 RX ORDER — PRAMIPEXOLE DIHYDROCHLORIDE 0.25 MG/1
TABLET ORAL
Qty: 90 TABLET | Refills: 3 | Status: SHIPPED | OUTPATIENT
Start: 2021-01-28 | End: 2021-04-12

## 2021-01-28 NOTE — PROGRESS NOTES
Consultation - [address] : 1945  MRN: [id number]     Assessment:  The patient has a history of severe obstructive sleep apnea, but results of prior testing are not available  The patient flatly refuses to try positive airway pressure therapy again  He has tried in the past without success  We discussed treatment alternatives including a mandibular advancement device  Inspire therapy may be considered  The patient also has restless legs syndrome  While he was taking OxyContin for his chronic pain, his symptoms were controlled  He has tried gabapentin in the past which was not effective  As far as I can tell, he has not used dopamine agonists and I will start pramipexole titrating between 0 125 mg and 0 75 mg over the next several weeks  He had been on oral iron in the past   I have checked a serum ferritin level to see if iron infusion may be appropriate  Plan:  Start pramipexole as above    I await results of his sleep study  I will address treatment options at a follow-up visit  Follow up: Three months    History of Present Illness:   80 y  o female with a history of neck injury requiring multiple surgeries  For years, he had experienced chronic pain, but inexplicably his pain improved and he was able to get off of his pain medications  As a result, he noticed that the restless legs, which he has experience in the past, returned once he was off of his opiates  His restless legs cause severe insomnia  The patient has a history of severe obstructive sleep apnea by report  I have no results from former testing  Apparently he tried positive airway pressure therapy for at least several months and was unable to adapt to using a mask  The patient has been treated for COPD with [facility] and p r n  Albuterol inhaler  He stopped those medications along with most of his other medications with no adverse effect        Review of Systems      Genitourinary none   Cardiology none   Gastrointestinal none   Neurology none   Constitutional none   Integumentary rash or dry skin and itching   Psychiatry none   Musculoskeletal none   Pulmonary none   ENT ringing in ears   Endocrine none   Hematological none       I have reviewed and updated the review of systems as necessary    Historical Information    Past Medical History:  The patient has renal insufficiency with a creatinine about 3 0, he has a history of two NSTMIs  He has a history of hypertension, neuropathy, including trigeminal neuralgia, arthritis, chronic back pain and 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  He also has depression and anxiety  He has COPD and he quit smoking  Family History: non-contributory    Social history:  The patient denies any history of alcohol abuse  He was a smoker of one pack per day of cigarettes until six years ago, when he quit  Counseling / Coordination of Care  A description of the counseling / coordination of care: We discussed the pathophysiology of obstructive sleep apnea as well as the possible treatment options  We also discussed the rationale for positive airway pressure therapy    Physical exam:  The patient is awake and alert  His cognitive function is intact and he is in no distress  His neck is unremarkable for thyromegaly or lymphadenopathy  Cardiac exam reveals regular rate with normal S1-S2  No murmurs or rubs are appreciated  Lungs are clear bilaterally  There is no evidence of wheezing  Air entry is normal   Extremities demonstrate no clubbing, cyanosis or edema  Board Certified Sleep Specialist    Portions of the record may have been created with voice recognition software  Occasional wrong word or "sound a like" substitutions may have occurred due to the inherent limitations of voice recognition software  Read the chart carefully and recognize, using context, where substitutions have occurred

## 2021-01-29 ENCOUNTER — TELEPHONE (OUTPATIENT)
Dept: SLEEP CENTER | Facility: CLINIC | Age: 76
End: 2021-01-29

## 2021-02-03 ENCOUNTER — TELEPHONE (OUTPATIENT)
Dept: SLEEP CENTER | Facility: CLINIC | Age: 76
End: 2021-02-03

## 2021-02-03 NOTE — TELEPHONE ENCOUNTER
I called and left a message  Rhabdomyolysis can occur with Mirapex  It is possible that the dark urine may have been a results  It is interesting to note that after three days the symptoms stopped  I will discuss regular use of OxyContin

## 2021-02-03 NOTE — TELEPHONE ENCOUNTER
Patient had multiple My Chart messages  I spoke with patient  Patient states he started taking the pramipexole, states the took 1/2 tablet for 3 days then increased to 1 tablet on the 4th day  He states read on line that if he has changes in his urine to contact his doctor  States he was having dark brown urine for about 3 days  Also he was having a "feeling that something was wrong" but there wasn't  Patient also wanted to DR Sena to know that he did NOT stop the mirtazipine 30 mg  Also: I get the same feeling down my left arm during the day, it used to be pain  i forgot to tell you about that  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 Also: Sorry   should have asked you on Thursday   if the oxy works and I find its not addictive which would you recommend? ? I have been taking oxy since 2003 with no addiction problems          Dr Js Huggins, please advise

## 2021-02-09 ENCOUNTER — IMMUNIZATIONS (OUTPATIENT)
Dept: FAMILY MEDICINE CLINIC | Facility: HOSPITAL | Age: 76
End: 2021-02-09

## 2021-02-09 DIAGNOSIS — Z23 ENCOUNTER FOR IMMUNIZATION: Primary | ICD-10-CM

## 2021-02-09 PROCEDURE — 91301 SARS-COV-2 / COVID-19 MRNA VACCINE (MODERNA) 100 MCG: CPT

## 2021-02-09 PROCEDURE — 0012A SARS-COV-2 / COVID-19 MRNA VACCINE (MODERNA) 100 MCG: CPT

## 2021-02-22 ENCOUNTER — LAB (OUTPATIENT)
Dept: LAB | Facility: CLINIC | Age: 76
End: 2021-02-22
Payer: COMMERCIAL

## 2021-02-22 ENCOUNTER — TRANSCRIBE ORDERS (OUTPATIENT)
Dept: LAB | Facility: CLINIC | Age: 76
End: 2021-02-22

## 2021-02-22 DIAGNOSIS — C61 MALIGNANT NEOPLASM OF PROSTATE (HCC): ICD-10-CM

## 2021-02-22 DIAGNOSIS — C61 MALIGNANT NEOPLASM OF PROSTATE (HCC): Primary | ICD-10-CM

## 2021-02-22 PROCEDURE — 84153 ASSAY OF PSA TOTAL: CPT

## 2021-02-22 PROCEDURE — 84154 ASSAY OF PSA FREE: CPT

## 2021-02-22 PROCEDURE — 36415 COLL VENOUS BLD VENIPUNCTURE: CPT

## 2021-02-23 NOTE — TELEPHONE ENCOUNTER
Received call from patient  States he did not receive voice message from Dr Bryce Boone  Advised patient of Dr Flavio Coleman message  Patient states he is no longer taking mirapex  He stopped taking it after his episode of dark urine  He is taking the Oxycontin and that controls the symptoms of restlesness that he has in his left arm  Offered to make a sooner appointment for patient so that he could discuss with Dr Bryce Boone but patient declined  He states he thinks his kidney cancer is returning  He plans to reach out to his nephrologist        Advised patient to call if any further questions or concerns

## 2021-02-24 LAB
PSA FREE MFR SERPL: <10 %
PSA FREE SERPL-MCNC: <0.02 NG/ML
PSA SERPL-MCNC: 0.2 NG/ML (ref 0–4)

## 2021-03-08 ENCOUNTER — IMMUNIZATIONS (OUTPATIENT)
Dept: FAMILY MEDICINE CLINIC | Facility: HOSPITAL | Age: 76
End: 2021-03-08

## 2021-03-08 DIAGNOSIS — Z23 ENCOUNTER FOR IMMUNIZATION: Primary | ICD-10-CM

## 2021-03-08 PROCEDURE — 91301 SARS-COV-2 / COVID-19 MRNA VACCINE (MODERNA) 100 MCG: CPT

## 2021-03-08 PROCEDURE — 0012A SARS-COV-2 / COVID-19 MRNA VACCINE (MODERNA) 100 MCG: CPT

## 2021-04-12 ENCOUNTER — OFFICE VISIT (OUTPATIENT)
Dept: FAMILY MEDICINE CLINIC | Facility: CLINIC | Age: 76
End: 2021-04-12
Payer: COMMERCIAL

## 2021-04-12 ENCOUNTER — TELEPHONE (OUTPATIENT)
Dept: ADMINISTRATIVE | Facility: OTHER | Age: 76
End: 2021-04-12

## 2021-04-12 VITALS
DIASTOLIC BLOOD PRESSURE: 80 MMHG | HEIGHT: 67 IN | HEART RATE: 72 BPM | SYSTOLIC BLOOD PRESSURE: 144 MMHG | BODY MASS INDEX: 22.84 KG/M2 | TEMPERATURE: 97.4 F | WEIGHT: 145.5 LBS

## 2021-04-12 DIAGNOSIS — E11.69 HYPERLIPIDEMIA ASSOCIATED WITH TYPE 2 DIABETES MELLITUS (HCC): ICD-10-CM

## 2021-04-12 DIAGNOSIS — N18.4 STAGE 4 CHRONIC KIDNEY DISEASE (HCC): ICD-10-CM

## 2021-04-12 DIAGNOSIS — Z72.0 TOBACCO ABUSE: ICD-10-CM

## 2021-04-12 DIAGNOSIS — E11.22 TYPE 2 DIABETES MELLITUS WITH STAGE 4 CHRONIC KIDNEY DISEASE, WITHOUT LONG-TERM CURRENT USE OF INSULIN (HCC): Primary | ICD-10-CM

## 2021-04-12 DIAGNOSIS — E78.5 HYPERLIPIDEMIA ASSOCIATED WITH TYPE 2 DIABETES MELLITUS (HCC): ICD-10-CM

## 2021-04-12 DIAGNOSIS — I10 ESSENTIAL HYPERTENSION: ICD-10-CM

## 2021-04-12 DIAGNOSIS — C64.1 CLEAR CELL CARCINOMA OF RIGHT KIDNEY (HCC): ICD-10-CM

## 2021-04-12 DIAGNOSIS — M50.00 CERVICAL DISC DISEASE WITH MYELOPATHY: ICD-10-CM

## 2021-04-12 DIAGNOSIS — I25.10 CORONARY ARTERY DISEASE INVOLVING NATIVE CORONARY ARTERY OF NATIVE HEART WITHOUT ANGINA PECTORIS: ICD-10-CM

## 2021-04-12 DIAGNOSIS — G89.4 CHRONIC PAIN DISORDER: ICD-10-CM

## 2021-04-12 DIAGNOSIS — N18.4 TYPE 2 DIABETES MELLITUS WITH STAGE 4 CHRONIC KIDNEY DISEASE, WITHOUT LONG-TERM CURRENT USE OF INSULIN (HCC): Primary | ICD-10-CM

## 2021-04-12 PROCEDURE — 99214 OFFICE O/P EST MOD 30 MIN: CPT | Performed by: FAMILY MEDICINE

## 2021-04-12 RX ORDER — OXYCODONE HYDROCHLORIDE 10 MG/1
TABLET ORAL
COMMUNITY
Start: 2021-04-09

## 2021-04-12 NOTE — ASSESSMENT & PLAN NOTE
Patient continues to have some issues with regard to cervical disc issues  He is getting x-rays from pain management, with the possibility of injections  I do think injections represent a reasonable option for treatment if this is indeed disc disease

## 2021-04-12 NOTE — PATIENT INSTRUCTIONS
Problem List Items Addressed This Visit     Cervical disc disease with myelopathy     Patient continues to have some issues with regard to cervical disc issues  He is getting x-rays from pain management, with the possibility of injections  I do think injections represent a reasonable option for treatment if this is indeed disc disease  Chronic pain disorder     Following with pain management  CKD (chronic kidney disease)     Lab Results   Component Value Date    EGFR 20 12/07/2020    EGFR 19 10/01/2020    EGFR 20 04/20/2020    CREATININE 2 92 (H) 12/07/2020    CREATININE 3 09 (H) 10/01/2020    CREATININE 3 00 (H) 04/20/2020   Labs due  Recommend check and follow with OV  [facility]     Follow with Renal and Urology  Coronary artery disease involving native coronary artery of native heart without angina pectoris     Follow with Cardio  No current problems  Hyperlipidemia associated with type 2 diabetes mellitus ([facility] 75 )       Lab Results   Component Value Date    HGBA1C 5 6 10/01/2020   Labs due  Follow after that  Hypertension     Stable  No changes  BP is a bit elevated  Would follow with cardio  Tobacco abuse     Still smoking  He was off tobacco for about a month, but developed some withdrawal symptoms and restarted  Type 2 diabetes mellitus ([facility] 75 ) - Primary       Lab Results   Component Value Date    HGBA1C 5 6 10/01/2020   Labs due  Follow after that  No concerns for now  COVID 19 Instructions    Rooseveltrobert Dawn was advised to limit contact with others to essential tasks such as getting food, medications, and medical care  Proper handwashing reviewed, and Hand sanitzer when washing is not available  If the patient develops symptoms of COVID 19, the patient should call the office as soon as possible  For [id number] Flu season, it is strongly recommended that Flu Vaccinations be obtained        Please try to download Google Duo  Once you do download this on your phone, you will be prompted to add your phone number to the account  After that, he should receive a text from [employer], and use that code to verify your phone number  After that, you should be able to use [employer] Duo to receive and make video calls  Please download Microsoft Teams to your phone or computer  We will be transitioning to this platform for Video Visits  Instructions for downloading this are available from the office  We are committed to getting you vaccinated as soon as possible and will be closely following CDC and [facility]  guidelines as they are released and revised  Please refer to our COVID-19 vaccine webpage for the most up to date information on the vaccine and our distribution efforts      Rc pederson

## 2021-04-12 NOTE — ASSESSMENT & PLAN NOTE
Lab Results   Component Value Date    EGFR 20 12/07/2020    EGFR 19 10/01/2020    EGFR 20 04/20/2020    CREATININE 2 92 (H) 12/07/2020    CREATININE 3 09 (H) 10/01/2020    CREATININE 3 00 (H) 04/20/2020   Labs due  Recommend check and follow with OV

## 2021-04-12 NOTE — PROGRESS NOTES
Assessment and Plan:    Problem List Items Addressed This Visit     Cervical disc disease with myelopathy     Patient continues to have some issues with regard to cervical disc issues  He is getting x-rays from pain management, with the possibility of injections  I do think injections represent a reasonable option for treatment if this is indeed disc disease  Chronic pain disorder     Following with pain management  CKD (chronic kidney disease)     Lab Results   Component Value Date    EGFR 20 12/07/2020    EGFR 19 10/01/2020    EGFR 20 04/20/2020    CREATININE 2 92 (H) 12/07/2020    CREATININE 3 09 (H) 10/01/2020    CREATININE 3 00 (H) 04/20/2020   Labs due  Recommend check and follow with OV  Clear cell carcinoma of kidney ([facility] )     Follow with Renal and Urology  Coronary artery disease involving native coronary artery of native heart without angina pectoris     Follow with Cardio  No current problems  Hyperlipidemia associated with type 2 diabetes mellitus ([facility] )       Lab Results   Component Value Date    HGBA1C 5 6 10/01/2020   Labs due  Follow after that  Hypertension     Stable  No changes  BP is a bit elevated  Would follow with cardio  Tobacco abuse     Still smoking  He was off tobacco for about a month, but developed some withdrawal symptoms and restarted  Type 2 diabetes mellitus ([facility] ) - Primary       Lab Results   Component Value Date    HGBA1C 5 6 10/01/2020   Labs due  Follow after that  No concerns for now                          Diagnoses and all orders for this visit:    Type 2 diabetes mellitus with stage 4 chronic kidney disease, without long-term current use of insulin (HCC)    Stage 4 chronic kidney disease (HCC)    Chronic pain disorder    Cervical disc disease with myelopathy    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)    Coronary artery disease involving native coronary artery of native heart without angina pectoris    Hyperlipidemia associated with type 2 diabetes mellitus ([facility] )    Essential hypertension    Tobacco abuse    Other orders  -     oxyCODONE (ROXICODONE) 10 MG TABS              Subjective:      Patient ID: Renita Naranjo is a 76 y o  male  CC:    Chief Complaint   Patient presents with    Follow-up     for chronic conditions  mgb       HPI:    Patient here to follow up on multiple issues  He went off pain meds in December, and was ok for a month  He noted that he was on Oxy before, and that helped the RLS symptoms he was having in the arms  No pain at this time  He wondered about Neuro follow up  Was about a month for this  Patient went to Pain management last week, and they was XR first   Patient reports was [facility]  Plan at this time is injections  CAD: States he is OK  Last OV was June 2020  Biggest issue at last OV was BP  DM: He is due for labs  He is seeing Endocrine  Not on meds currently  RLS: Was at neuro, and then went to Dr Rios Sanders  He was on a medication from Dr Rios Sanders, and noted brown urine  Due to this,    Spinal cord problems: He has been on multiple pain meds in past, including Fentanyl and Oxy  He reports he never gets sedation, and does not seem to have addiction issues per patient  Tobacco: Had quit for 23 days, but went back to smoking  He was feeling lightheaded  Hyperlipidemia: on Lipitor  No changes  Hypertension: on Coreg, and hydralazine  The following portions of the patient's history were reviewed and updated as appropriate: allergies, current medications, past family history, past medical history, past social history, past surgical history and problem list       Review of Systems   Constitutional: Negative  HENT: Negative  Eyes: Negative  Respiratory: Negative  Cardiovascular: Negative  Gastrointestinal: Negative  Endocrine: Negative  Genitourinary: Negative  Musculoskeletal: Negative  Skin: Negative  Allergic/Immunologic: Negative  Neurological:        Per HPI  Hematological: Negative  Psychiatric/Behavioral: Negative  Data to review:       Objective:    Vitals:    04/12/21 0859   BP: 144/80   BP Location: Left arm   Patient Position: Sitting   Cuff Size: Standard   Pulse: 72   Temp: (!) 97 4 °F (36 3 °C)   TempSrc: Temporal   Weight: 66 kg (145 lb 8 oz)   Height: 5' 7" (1 702 m)        Physical Exam  Vitals signs and nursing note reviewed  Constitutional:       Appearance: Normal appearance  Neck:      Vascular: No carotid bruit  Cardiovascular:      Rate and Rhythm: Normal rate and regular rhythm  Pulses: Normal pulses  no weak pulses          Carotid pulses are 2+ on the right side and 2+ on the left side  Dorsalis pedis pulses are 2+ on the right side and 2+ on the left side  Posterior tibial pulses are 2+ on the right side and 2+ on the left side  Heart sounds: Normal heart sounds  No murmur  No gallop  Pulmonary:      Effort: Pulmonary effort is normal  No respiratory distress  Breath sounds: Normal breath sounds  No stridor  No wheezing, rhonchi or rales  Chest:      Chest wall: No tenderness  Feet:      Right foot:      Skin integrity: No ulcer, skin breakdown, erythema, warmth, callus or dry skin  Left foot:      Skin integrity: No ulcer, skin breakdown, erythema, warmth, callus or dry skin  Neurological:      Mental Status: He is alert  Diabetic Foot Exam    Patient's shoes and socks removed  Right Foot/Ankle   Right Foot Inspection  Skin Exam: skin normal and skin intact no dry skin, no warmth, no callus, no erythema, no maceration, no abnormal color, no pre-ulcer, no ulcer and no callus                          Toe Exam: ROM and strength within normal limits  Sensory   Vibration: intact  Proprioception: intact   Monofilament testing: intact  Vascular  Capillary refills: < 3 seconds  The right DP pulse is 2+  The right PT pulse is 2+  Left Foot/Ankle  Left Foot Inspection  Skin Exam: skin normal and skin intactno dry skin, no warmth, no erythema, no maceration, normal color, no pre-ulcer, no ulcer and no callus                         Toe Exam: ROM and strength within normal limits                   Sensory   Vibration: intact  Proprioception: intact  Monofilament: intact  Vascular  Capillary refills: < 3 seconds  The left DP pulse is 2+  The left PT pulse is 2+  Assign Risk Category:  No deformity present; No loss of protective sensation;  No weak pulses       Risk: 0

## 2021-04-12 NOTE — TELEPHONE ENCOUNTER
----- Message from Mera Manjarrez sent at 4/12/2021  9:01 AM EDT -----  Regarding: diabetic eye exam  04/12/21 9:01 AM    Hello, our patient No patient name on file  has had Diabetic Eye Exam completed/performed  Please assist in updating the patient chart by making an External outreach to Dr Lopez Angel facility located in [place], [place]  The date of service is 07/13/2020      Thank you,  Mera Manjarrez  [facility] PRIMARY CARE

## 2021-04-12 NOTE — ASSESSMENT & PLAN NOTE
Still smoking  He was off tobacco for about a month, but developed some withdrawal symptoms and restarted

## 2021-04-15 NOTE — TELEPHONE ENCOUNTER
Upon review of the In Basket request we were able to locate, review, and update the patient chart as requested for Diabetic Eye Exam     Any additional questions or concerns should be emailed to the Practice Liaisons via [email or web address] email, please do not reply via In Basket      Thank you  Marielos Lees

## 2021-04-20 ENCOUNTER — APPOINTMENT (OUTPATIENT)
Dept: LAB | Facility: CLINIC | Age: 76
End: 2021-04-20
Payer: COMMERCIAL

## 2021-04-20 ENCOUNTER — TRANSCRIBE ORDERS (OUTPATIENT)
Dept: ADMINISTRATIVE | Facility: HOSPITAL | Age: 76
End: 2021-04-20

## 2021-04-20 ENCOUNTER — APPOINTMENT (OUTPATIENT)
Dept: RADIOLOGY | Facility: MEDICAL CENTER | Age: 76
End: 2021-04-20
Payer: COMMERCIAL

## 2021-04-20 DIAGNOSIS — E55.9 VITAMIN D DEFICIENCY: ICD-10-CM

## 2021-04-20 DIAGNOSIS — M47.812 SPONDYLOSIS OF CERVICAL REGION WITHOUT MYELOPATHY OR RADICULOPATHY: Primary | ICD-10-CM

## 2021-04-20 DIAGNOSIS — G25.81 RESTLESS LEGS SYNDROME (RLS): Primary | ICD-10-CM

## 2021-04-20 DIAGNOSIS — M47.812 SPONDYLOSIS OF CERVICAL REGION WITHOUT MYELOPATHY OR RADICULOPATHY: ICD-10-CM

## 2021-04-20 DIAGNOSIS — E11.69 HYPERLIPIDEMIA ASSOCIATED WITH TYPE 2 DIABETES MELLITUS (HCC): ICD-10-CM

## 2021-04-20 DIAGNOSIS — I10 ESSENTIAL HYPERTENSION: ICD-10-CM

## 2021-04-20 DIAGNOSIS — E78.5 HYPERLIPIDEMIA ASSOCIATED WITH TYPE 2 DIABETES MELLITUS (HCC): ICD-10-CM

## 2021-04-20 DIAGNOSIS — N18.4 STAGE 4 CHRONIC KIDNEY DISEASE (HCC): ICD-10-CM

## 2021-04-20 DIAGNOSIS — N18.4 TYPE 2 DIABETES MELLITUS WITH STAGE 4 CHRONIC KIDNEY DISEASE, WITHOUT LONG-TERM CURRENT USE OF INSULIN (HCC): ICD-10-CM

## 2021-04-20 DIAGNOSIS — E11.22 TYPE 2 DIABETES MELLITUS WITH STAGE 4 CHRONIC KIDNEY DISEASE, WITHOUT LONG-TERM CURRENT USE OF INSULIN (HCC): ICD-10-CM

## 2021-04-20 LAB
25(OH)D3 SERPL-MCNC: 45.3 NG/ML (ref 30–100)
ALBUMIN SERPL BCP-MCNC: 3.2 G/DL (ref 3.5–5)
ALP SERPL-CCNC: 50 U/L (ref 46–116)
ALT SERPL W P-5'-P-CCNC: 15 U/L (ref 12–78)
ANION GAP SERPL CALCULATED.3IONS-SCNC: 6 MMOL/L (ref 4–13)
AST SERPL W P-5'-P-CCNC: 11 U/L (ref 5–45)
BILIRUB SERPL-MCNC: 0.39 MG/DL (ref 0.2–1)
BUN SERPL-MCNC: 48 MG/DL (ref 5–25)
CALCIUM ALBUM COR SERPL-MCNC: 9.2 MG/DL (ref 8.3–10.1)
CALCIUM SERPL-MCNC: 8.6 MG/DL (ref 8.3–10.1)
CHLORIDE SERPL-SCNC: 114 MMOL/L (ref 100–108)
CHOLEST SERPL-MCNC: 152 MG/DL (ref 50–200)
CO2 SERPL-SCNC: 23 MMOL/L (ref 21–32)
CREAT SERPL-MCNC: 3.22 MG/DL (ref 0.6–1.3)
CREAT UR-MCNC: 89.2 MG/DL
EST. AVERAGE GLUCOSE BLD GHB EST-MCNC: 114 MG/DL
GFR SERPL CREATININE-BSD FRML MDRD: 18 ML/MIN/1.73SQ M
GLUCOSE P FAST SERPL-MCNC: 92 MG/DL (ref 65–99)
HBA1C MFR BLD: 5.6 %
HDLC SERPL-MCNC: 48 MG/DL
LDLC SERPL DIRECT ASSAY-MCNC: 89 MG/DL (ref 0–100)
MICROALBUMIN UR-MCNC: 308 MG/L (ref 0–20)
MICROALBUMIN/CREAT 24H UR: 345 MG/G CREATININE (ref 0–30)
POTASSIUM SERPL-SCNC: 5 MMOL/L (ref 3.5–5.3)
PROT SERPL-MCNC: 6.5 G/DL (ref 6.4–8.2)
SODIUM SERPL-SCNC: 143 MMOL/L (ref 136–145)

## 2021-04-20 PROCEDURE — 83721 ASSAY OF BLOOD LIPOPROTEIN: CPT

## 2021-04-20 PROCEDURE — 72100 X-RAY EXAM L-S SPINE 2/3 VWS: CPT

## 2021-04-20 PROCEDURE — 82306 VITAMIN D 25 HYDROXY: CPT

## 2021-04-20 PROCEDURE — 82465 ASSAY BLD/SERUM CHOLESTEROL: CPT

## 2021-04-20 PROCEDURE — 36415 COLL VENOUS BLD VENIPUNCTURE: CPT

## 2021-04-20 PROCEDURE — 82043 UR ALBUMIN QUANTITATIVE: CPT

## 2021-04-20 PROCEDURE — 83036 HEMOGLOBIN GLYCOSYLATED A1C: CPT

## 2021-04-20 PROCEDURE — 80053 COMPREHEN METABOLIC PANEL: CPT

## 2021-04-20 PROCEDURE — 72050 X-RAY EXAM NECK SPINE 4/5VWS: CPT

## 2021-04-20 PROCEDURE — 83718 ASSAY OF LIPOPROTEIN: CPT

## 2021-04-20 PROCEDURE — 82570 ASSAY OF URINE CREATININE: CPT

## 2021-04-20 NOTE — TELEPHONE ENCOUNTER
----- Message from Alexandria Short sent at 4/20/2021 10:16 AM EDT -----  Regarding: Prescription Question  Contact: [phone]  Can you refill the MIRTAZAPINE for me    Gerhardt Sep Gerhardt Sep thank you

## 2021-04-21 DIAGNOSIS — G25.81 RESTLESS LEGS SYNDROME (RLS): Primary | ICD-10-CM

## 2021-04-21 DIAGNOSIS — M50.00 CERVICAL DISC DISEASE WITH MYELOPATHY: ICD-10-CM

## 2021-04-21 DIAGNOSIS — G93.40 ENCEPHALOPATHY ACUTE: ICD-10-CM

## 2021-04-21 DIAGNOSIS — F41.9 ANXIETY: Primary | ICD-10-CM

## 2021-04-21 RX ORDER — MIRTAZAPINE 30 MG/1
30 TABLET, FILM COATED ORAL
Qty: 90 TABLET | Refills: 0 | OUTPATIENT
Start: 2021-04-21

## 2021-04-21 RX ORDER — MIRTAZAPINE 30 MG/1
30 TABLET, FILM COATED ORAL
Qty: 90 TABLET | Refills: 1 | Status: SHIPPED | OUTPATIENT
Start: 2021-04-21 | End: 2021-01-01

## 2021-05-05 ENCOUNTER — TELEPHONE (OUTPATIENT)
Dept: NEUROLOGY | Facility: CLINIC | Age: 76
End: 2021-05-05

## 2021-05-05 ENCOUNTER — TELEPHONE (OUTPATIENT)
Dept: FAMILY MEDICINE CLINIC | Facility: CLINIC | Age: 76
End: 2021-05-05

## 2021-05-05 NOTE — TELEPHONE ENCOUNTER
Pt was given a referral for Neurology from Dr Kelsey Amador  He called [facility] today, and has an appointment for 8/17/21  However, he states that when he was on the phone with them, they told him he had "seizure" listed as one of the diagnoses  The pt states he does not have seizures  However, on the referral in Epic, it states G25 81 Restless legs syndrome, M50 00 cervical disc disease with myelopathy, & G93 40 Encephalopathy acute as the diagnoses  The pt is just very confused and wants to know what he should do, even though he has the appointment?

## 2021-05-05 NOTE — TELEPHONE ENCOUNTER
Best contact number for patient:  [phone]  Emergency Contact name and number:  Cesardougiejoyce Rodolfo  Referring provider and telephone number:  Dr Felicia Brown  Primary Care Provider Name and if affiliated with [facility]:     Reason for Appointment/Dx:  RLS/Cervical Disc disease with meylopathy  Have you seen and followed up with a pediatric Neurologist for this disease in the past?      No (If yes ok to schedule with Dr Funmilayo Banegas)    Neurology Location patient would like to be seen:    Order received? Yes                                                 Records Received? Yes    Have you ever seen another Neurologist?       No    Insurance Information    Insurance Name:    ID/Policy #:    Secondary Insurance:    ID/Policy#: Workman's Comp/ Accident/ School  Information      Workman's Comp/Accident/School related?        No    If yes name of Insurance company:    Claim #:    Date of Injury:    Type of Injury:     Name and Telephone Number:    Notes:                   Appointment date:   New patient appt made with Dr Von Borjas at [facility] 9/23 @ 2p - new pt ppwk mailed to home - placed on cancellation list

## 2021-05-05 NOTE — TELEPHONE ENCOUNTER
I would recommend that he follow-up with Neurology  I do not have seizures listed in his problem list, and I do not see that from other places either  He will need to discuss with Neurology

## 2021-05-06 DIAGNOSIS — M51.36 DDD (DEGENERATIVE DISC DISEASE), LUMBAR: ICD-10-CM

## 2021-05-06 DIAGNOSIS — M50.00 CERVICAL DISC DISEASE WITH MYELOPATHY: Primary | ICD-10-CM

## 2021-05-07 DIAGNOSIS — L20.9 ATOPIC DERMATITIS, UNSPECIFIED TYPE: ICD-10-CM

## 2021-05-07 RX ORDER — PREDNISONE 1 MG/1
TABLET ORAL
Qty: 200 TABLET | Refills: 0 | Status: SHIPPED | OUTPATIENT
Start: 2021-05-07 | End: 2021-01-01 | Stop reason: DRUGHIGH

## 2021-06-04 PROBLEM — M81.0 AGE-RELATED OSTEOPOROSIS WITHOUT CURRENT PATHOLOGICAL FRACTURE: Status: ACTIVE | Noted: 2021-01-01

## 2021-06-04 NOTE — PROGRESS NOTES
Established Patient Progress Note       Chief Complaint   Patient presents with    Osteoporosis        History of Present Illness:     Alfreda Severance is a 76 y o  male with a history of secondary adrenal insufficiency, secondary hyperparathyroidism due to CKD stage4, osteoporosis, and vitamin d deficiency  For the secondary adrenal insufficiency he is currently taking prednisone 10 mg daily  He is feeling well on this dose and it helps prevent him from having flair of his atopic dermatitis  For the secondary hyperparathyroidism he is following with nephrology  He underwent dexa scan recently which did show osteoporosis with lumbar spine t score -2 4, left hip -3 1, left femoral neck -3 6, and right forearm -2 5  FRAX score 15% for hip fracture and 24% for any major osteoporotic fracture  He is active  He denies any recent falls or fractures  For the vitamin d deficiency he is taking 5,000 units daily             Patient Active Problem List   Diagnosis    Atopic dermatitis    Anxiety    Bradycardia, sinus    DIEUDONNE (obstructive sleep apnea)    Cervical disc disease with myelopathy    Chronic pain disorder    CKD (chronic kidney disease)    Type 2 diabetes mellitus ([facility]    Coronary artery disease involving native coronary artery of native heart without angina pectoris    DDD (degenerative disc disease), lumbar    Depression    DNR (do not resuscitate)    Eczema    Encephalopathy acute    Hypertension    Hyperlipidemia associated with type 2 diabetes mellitus ([facility]    Increased frequency of urination    Iron deficiency anemia    Long term (current) use of systemic steroids    NSTEMI (non-ST elevated myocardial infarction) (HCC)    Prostate cancer (HCC)    Ptosis, bilateral    Chronic obstructive pulmonary disease (HCC)    Clear cell carcinoma of kidney (HCC)    Restless legs syndrome (RLS)    Retroperitoneal lymphadenopathy    Secondary adrenal insufficiency (HCC)    Tobacco abuse    Adenomatous duodenal polyp    Vitamin D deficiency    Hyperparathyroidism ([place] [address] )    Hematuria    Hypertensive retinopathy, bilateral    Age-related nuclear cataract, bilateral    Age-related osteoporosis without current pathological fracture      Past Medical History:   Diagnosis Date    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[address] )     JESSICA (acute kidney injury) ([place] [address] ) 9/7/2017    Nephrology: Dr Yael Jordan   CAD (coronary artery disease)     Hypertension     Macular degeneration     Personal history of kidney cancer     Prostate cancer ([place] [address] )     Spinal cord injury, cervical region ([place] [address] )     Type 2 diabetes mellitus ([place] [address] )       Past Surgical History:   Procedure Laterality Date    CERVICAL SPINE SURGERY      COLONOSCOPY W/ BIOPSIES      PARTIAL NEPHRECTOMY Right 02/01/2017      Family History   Problem Relation Age of Onset    Diabetes Mother     Heart attack Mother     Heart failure Mother     Diabetes Father     Heart attack Father     Lupus Sister     Diabetes Brother     Heart disease Brother      Social History     Tobacco Use    Smoking status: Current Every Day Smoker     Packs/day: 1 00     Years: 60 00     Pack years: 60 00     Types: Cigarettes    Smokeless tobacco: Never Used   Substance Use Topics    Alcohol use: Not Currently     Allergies   Allergen Reactions    Ace Inhibitors Hives     Renal cell ca with prior nephrectomy, CKD with JESSICA recently       Baclofen      Sedation toxicity     Bactrim [Sulfamethoxazole-Trimethoprim] Dizziness and Fatigue     Severe fatigue    Bupropion      Violent     Fentanyl Myalgia     "i lost control of my left arm and left leg"    Gabapentin      Cannot control his bowels    Mirapex [Pramipexole] Other (See Comments)     Dark or brown urine    Norvasc [Amlodipine] Nausea Only and Other (See Comments)     Bad taste in mouth    Pregabalin      Weight gain, become manic    Topiramate      Increased pain and body aches        Current Outpatient Medications:    aspirin (ASPIRIN 81) 81 mg EC tablet, Take 81 mg by mouth daily, Disp: , Rfl:     atorvastatin (LIPITOR) 80 mg tablet, Take 80 mg by mouth daily, Disp: , Rfl:     carvedilol (COREG) 25 mg tablet, Take 25 mg by mouth 2 (two) times a day with meals, Disp: , Rfl:     Cholecalciferol ( VITAMIN D3) 50 MCG (2000 UT) CAPS, Take 1 capsule (2,000 Units total) by mouth daily, Disp: 30 capsule, Rfl: 11    clopidogrel (PLAVIX) 75 mg tablet, Take 75 mg by mouth daily, Disp: , Rfl:     ferrous sulfate 325 (65 Fe) mg tablet, Take 325 mg by mouth, Disp: , Rfl:     Fluad Quadrivalent 0 5 ML PRSY, PHARMACY ADMINISTERED, Disp: , Rfl:     hydrALAZINE (APRESOLINE) 25 mg tablet, Take 25 mg by mouth 2 (two) times a day, Disp: , Rfl:     mirtazapine (REMERON) 30 mg tablet, Take 1 tablet (30 mg total) by mouth daily at bedtime, Disp: 90 tablet, Rfl: 1    Multiple Vitamins-Minerals (Ocuvite-Lutein) CAPS, Take 1 capsule by mouth daily, Disp: , Rfl:     nitroglycerin (NITROSTAT) 0 4 mg SL tablet, Place 0 4 mg under the tongue, Disp: , Rfl:     oxyCODONE (ROXICODONE) 10 MG TABS, , Disp: , Rfl:     predniSONE 10 mg tablet, Take 1 tablet (10 mg total) by mouth daily, Disp: 30 tablet, Rfl: 5    tacrolimus (PROTOPIC) 0 1 % ointment, Apply topically twice daily to affected areas (Patient not taking: Reported on 11/6/2020), Disp: 100 g, Rfl: 3    Review of Systems   Constitutional: Negative for activity change, appetite change and fatigue  HENT: Negative for sore throat, trouble swallowing and voice change  Eyes: Negative for visual disturbance  Respiratory: Negative for choking, chest tightness and shortness of breath  Cardiovascular: Negative for chest pain, palpitations and leg swelling  Gastrointestinal: Negative for abdominal pain, constipation and diarrhea  Endocrine: Negative for cold intolerance, heat intolerance, polydipsia, polyphagia and polyuria  Genitourinary: Negative for frequency     Musculoskeletal: Negative for arthralgias and myalgias  Skin: Negative for rash  Neurological: Negative for dizziness and syncope  Hematological: Negative for adenopathy  Psychiatric/Behavioral: Negative for sleep disturbance  All other systems reviewed and are negative  Physical Exam:  Body mass index is 23 74 kg/m²  BP (!) 210/90 (BP Location: Left arm, Patient Position: Sitting, Cuff Size: Large)   Pulse 70   Ht 5' 7" (1 702 m)   Wt 68 8 kg (151 lb 9 6 oz)   BMI 23 74 kg/m²    Wt Readings from Last 3 Encounters:   06/04/21 68 8 kg (151 lb 9 6 oz)   04/12/21 66 kg (145 lb 8 oz)   01/28/21 64 5 kg (142 lb 3 2 oz)       Physical Exam  Vitals signs reviewed  Constitutional:       General: He is not in acute distress  Appearance: He is well-developed  HENT:      Head: Normocephalic and atraumatic  Eyes:      Conjunctiva/sclera: Conjunctivae normal       Pupils: Pupils are equal, round, and reactive to light  Neck:      Musculoskeletal: Normal range of motion and neck supple  Thyroid: No thyromegaly  Cardiovascular:      Rate and Rhythm: Normal rate and regular rhythm  Heart sounds: Normal heart sounds  No murmur  Pulmonary:      Effort: Pulmonary effort is normal  No respiratory distress  Breath sounds: Normal breath sounds  No wheezing or rales  Abdominal:      General: Bowel sounds are normal  There is no distension  Palpations: Abdomen is soft  Tenderness: There is no abdominal tenderness  Musculoskeletal: Normal range of motion  Lymphadenopathy:      Cervical: No cervical adenopathy  Skin:     General: Skin is warm and dry  Neurological:      Mental Status: He is alert and oriented to person, place, and time  Imaging:     CENTRAL  DXA SCAN     CLINICAL HISTORY:   76year old  male risk factors include personal history prostate and kidney cancer  Type II diabetes with stage IV kidney disease    Steroid medication for the past 9 years      TECHNIQUE: Bone densitometry was performed using a [employer]'s W bone densitometer  Regions of interest appear properly placed  There are   other confounding variables which could limit the study        Degenerative or osteoarthritic changes often noted in this age group appear to be present on the spine image      COMPARISON:  Baseline     RESULTS:   LUMBAR SPINE:  L1-L4:  BMD 0 822 gm/cm2  T-score below normal, -2 4  However, L1 and L2 measure at -2 85, osteoporosis  Z-score -1 4     LEFT TOTAL HIP:  BMD 0 569 gm/cm2  T-score below normal, -3 1  Z-score -2 2     LEFT FEMORAL NECK:  BMD 0 439 gm/cm2  T-score below normal, -3 6  Z-score -2 3     The right forearm BMD is 0 684 and the T score is below normal, -2 5, osteoporosis  The Z score is -0  8      A 25-hydroxy vitamin D level, an intact PTH, and a comprehensive metabolic panel are suggested in this patient      Treatment is a consideration perhaps with Prolia for a number of years followed by intravenous Reclast      IMPRESSION:  1  Based on the [facility] classification, this study identifies a diagnosis of osteoporosis, notable at the spine, femoral neck, total hip, and forearm areas and the patient is considered at significantly increased risk for fracture  2  A daily intake of calcium of at least 1200 mg and vitamin D, 800-1000 IU, as well as weight bearing and muscle strengthening exercise, fall prevention and avoidance of tobacco and excessive alcohol intake as basic preventive measures are recommended      3  Repeat DXA scan on the same equipment in 18-24 months as clinically indicated         The 10 year risk of hip fracture is 15%, with the 10 year risk of major osteoporotic fracture being 24%, as calculated by the WHO fracture risk assessment tool (FRAX)    The current NOF guidelines recommend treating patients with FRAX 10 year risk score   of >3% for hip fracture and >20% for major osteoporotic fracture          Impression & Plan:    Problem List Items Addressed This Visit        Endocrine    Secondary adrenal insufficiency ([facility] ) - Primary     Continue current dose of prednisone          Hyperparathyroidism ([facility] )     Due to CKD            Musculoskeletal and Integument    Age-related osteoporosis without current pathological fracture     DEXA scan showed osteoporosis  Recommend treatment with Prolia every 6 months  Patient agreeable to this  Will submit for prior auth  Genitourinary    CKD (chronic kidney disease)     Continue f/u with nephrology             Other    Vitamin D deficiency     Continue vitamin d supplementation                Discussed with the patient and all questioned fully answered  He will call me if any problems arise        Counseled patient on diagnostic results, prognosis, risk and benefit of treatment options, instruction for management, importance of treatment compliance, Risk  factor reduction and impressions      Laila Vallejo [address]

## 2021-06-04 NOTE — ASSESSMENT & PLAN NOTE
DEXA scan showed osteoporosis  Recommend treatment with Prolia every 6 months  Patient agreeable to this  Will submit for prior auth

## 2021-06-04 NOTE — TELEPHONE ENCOUNTER
DEXA scan showed osteoporosis  Recommend treatment with Prolia every 6 months  Patient agreeable to this  Please submit for prior auth

## 2021-06-08 NOTE — TELEPHONE ENCOUNTER
Verification received  PA required  OOP cost for Prolia is 20% and admin fee of $35  Called and notified pt that cost for Prolia and admin fee is around $370 give or take  Pt said that was ok and he would like me to move forward with PA  Told him I would call him once I give approval so appt can be scheduled

## 2021-06-08 NOTE — TELEPHONE ENCOUNTER
Perfect  Please call the pt to schedule   I spoke to him this morning and he is aware of the cost     Thanks

## 2021-06-08 NOTE — TELEPHONE ENCOUNTER
Dipti Contreras from [facility] called  The [facility] is AUTHORIZED    Auth# [id number]    Lifetime/Open-Ended    Dipti Contreras # [phone]    Thank you!

## 2021-06-08 NOTE — TELEPHONE ENCOUNTER
Guido Oconnor from [facility] called wanting to know if med was to go through medical as buy and bill or under pharmacy  Notified her that it was to be under buy and bill  She transferred info over

## 2021-06-25 PROBLEM — F33.0 MILD EPISODE OF RECURRENT MAJOR DEPRESSIVE DISORDER (HCC): Status: ACTIVE | Noted: 2021-01-01

## 2021-06-25 NOTE — PROGRESS NOTES
Assessment  1  Chronic pain syndrome    2  Cervical disc disease with myelopathy    3  DDD (degenerative disc disease), lumbar    4  History of fusion of cervical spine    5  Cervical disc disorder with radiculopathy of mid-cervical region    6  Foraminal stenosis of cervical region    7  Mild episode of recurrent major depressive disorder [facility])        Plan  Mr Edmundo Espinoza is a pleasant 71-year-old male with significant past medical history of decompression and anterior fusion from C4-C6 and posterior instrumented fusion from C3-C6 who presents for initial evaluation regarding worsening neck pain with radiating symptoms into the left upper extremity  During today's evaluation he is demonstrating cervical radiculopathy into the left upper extremity with previous MRI in 2016 demonstrating multilevel moderate to severe foraminal stenosis most notable at C6-C7 and C7-T1  However these imaging studies were done nearly 5 years ago in further diagnostic workup would be beneficial and warranted  At this time we will order a cervical MRI without contrast to better identify progressively worsening stenosis and degenerative changes that may be contributing to his ongoing pain  He has already receiving opiate medications from his other providers so for now we will just order an MRI and follow-up with results and plan moving forward  He may benefit from an interventional approach including a cervical epidural steroid injection but again we will await MRI  My impressions and treatment recommendations were discussed in detail with the patient who verbalized understanding and had no further questions  Discharge instructions were provided  I personally saw and examined the patient and I agree with the above discussed plan of care  Orders Placed This Encounter   Procedures    MRI cervical spine without contrast     Standing Status:   Future     Standing Expiration Date:   6/25/2025     Scheduling Instructions:       There is no preparation for this test  Please leave your jewelry and valuables at home, wedding rings are the exception  Magnetic nail polish must be removed prior to arrival for your test  Please bring your insurance cards, a form of photo ID and a list of your medications with you  Arrive 15 minutes prior to your appointment time in order to register  Please bring any prior CT or MRI studies of this area that were not performed at a [facility] facility  To schedule this appointment, please contact Central Scheduling at [phone]  Prior to your appointment, please make sure you complete the MRI Screening Form when you e-Check in for your appointment  This will be available starting 7 days before your appointment in [address]  You may receive an e-mail with an activation code if you do not have a [employer] account  If you do not have access to a device, we will complete your screening at your appointment  Order Specific Question:   What is the patient's sedation requirement? Answer:   No Sedation     Order Specific Question:   Release to patient through [employer]     Answer:   Immediate     Order Specific Question:   Is order priority selected as STAT? Answer:   No     Order Specific Question:   Reason for Exam (FREE TEXT)     Answer:   History of anterior and posterior cervical fusion with worsening cervical radiculopathy     No orders of the defined types were placed in this encounter  History of Present Illness    Remedios Pitchkvng is a 76 y o  male with significant past medical history of a C4-C6 anterior and C3-C6 posterior fusion [facility] for initial evaluation regarding 4 months duration of left upper extremity pain  Patient denies any significant inciting event or recent trauma  Today reports moderate to severe pain rated 6/10 and interfering with daily activities    Pain is nearly constant 60-95% of the time with no specific pattern of morning, afternoon or evening  Describes symptoms as numbness, dull aching, sharp pain  Denies dropping objects or any weakness  Does not use any durable medical equipment for ambulation  Symptoms are worse with lying down  He has had no significant relief with previous injections  Admits to smoking 1 pack per day for the last 60 years currently taking MS Contin which is providing relief  Presents for initial evaluation and referral for suspected cervical radiculopathy  I have personally reviewed and/or updated the patient's past medical history, past surgical history, family history, social history, current medications, allergies, and vital signs today  Review of Systems   Constitutional: Negative for fever and unexpected weight change  HENT: Negative for trouble swallowing  Eyes: Negative for visual disturbance  Respiratory: Negative for shortness of breath and wheezing  Cardiovascular: Negative for chest pain and palpitations  Gastrointestinal: Negative for constipation, diarrhea, nausea and vomiting  Endocrine: Negative for cold intolerance, heat intolerance and polydipsia  Genitourinary: Negative for difficulty urinating and frequency  Musculoskeletal: Positive for neck pain and neck stiffness  Negative for arthralgias, gait problem, joint swelling and myalgias  Skin: Negative for rash  Neurological: Negative for dizziness, seizures, syncope, weakness and headaches  Hematological: Does not bruise/bleed easily  Psychiatric/Behavioral: Negative for dysphoric mood  All other systems reviewed and are negative        Patient Active Problem List   Diagnosis    Atopic dermatitis    Anxiety    Bradycardia, sinus    DIEUDONNE (obstructive sleep apnea)    Cervical disc disease with myelopathy    Chronic pain disorder    CKD (chronic kidney disease)    Type 2 diabetes mellitus ([facility] )    Coronary artery disease involving native coronary artery of native heart without angina pectoris    DDD (degenerative disc disease), lumbar    Depression    DNR (do not resuscitate)    Eczema    Encephalopathy acute    Hypertension    Hyperlipidemia associated with type 2 diabetes mellitus ([facility] )    Increased frequency of urination    Iron deficiency anemia    Long term (current) use of systemic steroids    NSTEMI (non-ST elevated myocardial infarction) (HCC)    Prostate cancer (HCC)    Ptosis, bilateral    Chronic obstructive pulmonary disease (HCC)    Clear cell carcinoma of kidney (HCC)    Restless legs syndrome (RLS)    Retroperitoneal lymphadenopathy    Secondary adrenal insufficiency (HCC)    Tobacco abuse    Adenomatous duodenal polyp    Vitamin D deficiency    Hyperparathyroidism (HCC)    Hematuria    Hypertensive retinopathy, bilateral    Age-related nuclear cataract, bilateral    Age-related osteoporosis without current pathological fracture    Mild episode of recurrent major depressive disorder ([facility] )       Past Medical History:   Diagnosis Date    [facility]     JESSICA [facility]acute kidney [facility]) ([facility] ) 9/7/2017    Nephrology: Dr Ada Correa      CAD (coronary artery disease)     Cancer (HCC)     Chronic kidney disease     Chronic pain     Depression     Diabetes mellitus ([facility] )     Hyperlipidemia     Hypertension     Macular degeneration     Personal history of kidney cancer     Prostate cancer ([facility] )     Spinal cord injury, cervical region ([facility] )     Type 2 diabetes mellitus ([facility] )        Past Surgical History:   Procedure Laterality Date    CERVICAL SPINE SURGERY      COLONOSCOPY W/ BIOPSIES      LAMINECTOMY      PARTIAL NEPHRECTOMY Right 02/01/2017       Family History   Problem Relation Age of Onset    Diabetes Mother     Heart attack Mother     Heart failure Mother     Diabetes Father     Heart attack Father     Lupus Sister     Diabetes Brother     Heart disease Brother        Social History     Occupational History    Occupation:    Tobacco Use    Smoking status: Current Every Day Smoker     Packs/day: 1 00     Years: 60 00     Pack years: 60 00     Types: Cigarettes    Smokeless tobacco: Never Used   Vaping Use    Vaping Use: Never used   Substance and Sexual Activity    Alcohol use: Not Currently    Drug use: Never    Sexual activity: Not on file       Current Outpatient Medications on File Prior to Visit   Medication Sig    aspirin (ASPIRIN 81) 81 mg EC tablet Take 81 mg by mouth daily    atorvastatin (LIPITOR) 80 mg tablet Take 80 mg by mouth daily    carvedilol (COREG) 25 mg tablet Take 25 mg by mouth 2 (two) times a day with meals    Cholecalciferol (SM VITAMIN D3) 50 MCG (2000 UT) CAPS Take 1 capsule (2,000 Units total) by mouth daily    clopidogrel (PLAVIX) 75 mg tablet Take 75 mg by mouth daily    ferrous sulfate 325 (65 Fe) mg tablet Take 325 mg by mouth    hydrALAZINE (APRESOLINE) 25 mg tablet Take 25 mg by mouth 2 (two) times a day    mirtazapine (REMERON) 30 mg tablet Take 1 tablet (30 mg total) by mouth daily at bedtime    Multiple Vitamins-Minerals (Ocuvite-Lutein) CAPS Take 1 capsule by mouth daily    nitroglycerin (NITROSTAT) 0 4 mg SL tablet Place 0 4 mg under the tongue    oxyCODONE (ROXICODONE) 10 MG TABS     predniSONE 10 mg tablet Take 1 tablet (10 mg total) by mouth daily    Fluad Quadrivalent 0 5 ML PRSY PHARMACY ADMINISTERED    tacrolimus (PROTOPIC) 0 1 % ointment Apply topically twice daily to affected areas (Patient not taking: Reported on 11/6/2020)     Current Facility-Administered Medications on File Prior to Visit   Medication    denosumab (PROLIA) subcutaneous injection 60 mg       Allergies   Allergen Reactions    Ace Inhibitors Hives     Renal cell ca with prior nephrectomy, CKD with JESSICA recently       Baclofen      Sedation toxicity     Bactrim [Sulfamethoxazole-Trimethoprim] Dizziness and Fatigue     Severe fatigue    Bupropion      Violent     Fentanyl Myalgia     "i lost control of my left arm and left leg"    Mirapex [Pramipexole] Other (See Comments)     Dark or brown urine    Norvasc [Amlodipine] Nausea Only and Other (See Comments)     Bad taste in mouth    Pregabalin      Weight gain, become manic    Topiramate      Increased pain and body aches        Physical Exam    BP (!) 180/78   Pulse 87   Temp 97 8 °F (36 6 °C)   Ht 5' 7" (1 702 m)   Wt 64 9 kg (143 lb)   BMI 22 40 kg/m²     Constitutional: normal, well developed, well nourished, alert, in no distress and non-toxic and no overt pain behavior  Eyes: anicteric  HEENT: grossly intact  Neck: supple, symmetric, trachea midline and no masses   Pulmonary:even and unlabored  Cardiovascular:No edema or pitting edema present  Skin:Normal without rashes or lesions and well hydrated  Psychiatric:Mood and affect appropriate  Neurologic:Cranial Nerves II-XII grossly intact  Musculoskeletal:normal gait, tenderness to palpation bilateral cervical paraspinals, decreased active and passive range of motion with cervical flexion, extension, left side bending and rotation limited by pain and previous surgeries, MMT 5/5 bilateral upper extremities, sensation decreased to light touch in patchy distribution left upper extremity, positive Spurling with radicular pain into the left upper extremity, DTRs within normal limits, Lawrence sign negative    Imaging  Study Result    Narrative & Impression   CERVICAL SPINE     INDICATION:   [id number]: Spondylosis without myelopathy or radiculopathy, cervical region  Neck pain radiating down left arm with numbness and tingling      COMPARISON:  None     VIEWS:  XR SPINE CERVICAL COMPLETE 4 OR 5 VW NON INJURY         FINDINGS:     There is surgical hardware with bilateral pedicle screws from C3 through C6  There is an anterior plate and screw device from C4 through C6  Hardware is intact      No fracture       Mild reversal of the cervical lordosis      There is fusion from C4 through C6  There is narrowing at C3-4 and C6 through T1      Mild narrowing on the right at C2-3      The prevertebral soft tissues are within normal limits        The lung apices are clear      IMPRESSION:     1  Intact surgical hardware status post anterior fusion from C4 through C6 and posterior fusion from C3 through C6      2  Disc space narrowing at C3-4 and C6 through T1      3   Mild neuroforaminal narrowing on the right at C2-3      4   Mild reversal of the normal cervical lordosis          Workstation performed: [id number]

## 2021-06-25 NOTE — PATIENT INSTRUCTIONS
Cervical Radiculopathy   WHAT YOU NEED TO KNOW:   What is cervical radiculopathy? Cervical radiculopathy is a painful condition that happens when a spinal nerve in your neck is pinched or irritated  What causes cervical radiculopathy? Changes in the vertebrae (bones) and discs in your neck can put pressure on a spinal nerve  Discs are natural, spongy cushions between your vertebrae that allow your spine to move  The following can cause a pinched nerve:  · Disc damage  may occur if a disc flattens, bulges, or moves over time  An injury can also cause disc damage  · Cervical spondylosis  is when the vertebrae in your neck break down  This normally occurs as you age  · Growths , such as tumors or cysts (fluid-filled lumps), may grow and press on the nerve  What are the signs and symptoms of cervical radiculopathy? The most common symptom is sharp pain that travels from your neck all the way down your arm  You may have pain in your shoulder, chest, and hand  The pain may get worse with movement or when you cough or sneeze  You may also have any of the following:  · Burning or tingling sensations in your neck or arm     · Numbness or weakness in your arm or hand that makes it hard for you to  objects    · Headaches    How is cervical radiculopathy diagnosed? Your healthcare provider will ask when and how your symptoms began  He will gently press on your neck to check for tenderness and areas that are not shaped correctly  He will also check your arms and hands for numbness or weakness  You may have any of the following:  · Provocative tests  are done to check your response to certain movements  He will ask you to move your neck, shoulder, and arm in different ways  Some movements will increase your symptoms, while others will make you feel better  · An x-ray  is a picture of the bones and tissues in your neck  · An MRI or a CT scan  may be used to take pictures of your neck   The pictures can show problems and changes in your nerves, discs, and vertebrae  You may be given dye to help the pictures show up better  Tell the healthcare provider if you have ever had an allergic reaction to contrast dye  Do not enter the MRI room with anything metal  Metal can cause serious injury  Tell the healthcare provider if you have any metal in or on your body  · An electromyography  is also called an EMG  An EMG is done to test the function of your muscles and the nerves that control them  Electrodes (wires) are placed on the muscle being tested  Needles may be attached to the electrodes and placed in your skin  The electrical activity of your muscles and nerves is measured by a machine attached to the electrodes  Your muscles are tested at rest and during movement  How is cervical radiculopathy treated? · NSAIDs  decrease swelling and pain  This medicine can be bought without a doctor's order  This medicine can cause stomach bleeding or kidney problems in certain people  If you take blood thinner medicine, always ask your healthcare provider if NSAIDs are safe for you  Always read the medicine label and follow the directions on it before using this medicine  · Prescription pain medicine  may be given to decrease pain  Do not wait until the pain is severe before you take this medicine  · Steroids  help decrease pain and swelling  These may be given as a pill or as an injection in your neck  You may need more than 1 injection if your symptoms do not improve after the first treatment  · Physical therapy  helps stretch and strengthen your muscles  Your physical therapist can teach you how to improve your posture and the way you hold your neck  He may also teach you how to be safely active and avoid further injury  He can also help you develop an exercise program that is safe for your back and neck       · Surgery  may be used to treat a pinched nerve if other treatments have not helped after 6 to 12 weeks  How can I manage my symptoms? · Ice  helps decrease swelling and pain  Ice may also help prevent tissue damage  Use an ice pack, or put crushed ice in a plastic bag  Cover it with a towel and place it on your neck for 15 to 20 minutes every hour or as directed  · Rest  when you feel it is needed  Slowly start to do more each day  Return to your daily activities as directed  · Wear a soft collar  You may be given a soft collar to support your neck while you sleep  Wear the soft collar only as directed  · Do light stretches and regular exercise  Your healthcare provider may suggest light stretches to help decrease stiffness in your neck and arm as you recover  After your pain is controlled, you may benefit from regular exercise  Ask what type of exercise is safe for your back and neck  · Review your work area  A comfortable work area can help prevent neck strain  Ask your employer for an ergonomic review to check the position of your desk, chair, phone, and computer  Make any necessary adjustments for your comfort  When should I contact my healthcare provider? · You are losing weight without trying  · Your pain is worse, even with medicine  · One or both hands feel more numb than before, or you cannot move your fingers well  · You have questions or concerns about your condition or care  CARE AGREEMENT:   You have the right to help plan your care  Learn about your health condition and how it may be treated  Discuss treatment options with your healthcare providers to decide what care you want to receive  You always have the right to refuse treatment  The above information is an  only  It is not intended as medical advice for individual conditions or treatments  Talk to your doctor, nurse or pharmacist before following any medical regimen to see if it is safe and effective for you    © Copyright [employer] 2020 Information is for End User's use only and may not be sold, redistributed or otherwise used for commercial purposes   All illustrations and images included in CareNotes® are the copyrighted property of A D A M , Inc  or [facility] Noemi Grissom

## 2021-07-09 NOTE — TELEPHONE ENCOUNTER
----- Message from [facility] Never sent at 7/9/2021  1:24 PM EDT -----  Regarding: Non-Urgent Medical Question  Contact: [phone]  I was admitted to [facility] on 6/28 with copd and  Came home on 7/3  I was sent home without oxygen  I am light headed when I try to do things like walk  What do you think I should do? ??

## 2021-07-09 NOTE — TELEPHONE ENCOUNTER
I sent message back to pt that according to Dr Zay Yung pt should go back to the ED  Pt then called the office back and we spoke and he does not want to go back as he told hem before he left the Hospital that he was dizzy with movement and they D/C'd him anyway  He asked if having a cigarette would make it better and I told him that would more than likely not help  Pt was willing to make a OV here but he does not want to go to the [address] [place] [address] for 7/12/2021

## 2021-07-12 NOTE — ASSESSMENT & PLAN NOTE
Patient has had appointment set up in the past, but they keep getting canceled by Cardiology  Would recommend that he speak to another provider in that office  Imdur was added at [facility] during recent admission

## 2021-07-12 NOTE — ASSESSMENT & PLAN NOTE
Patient with increased SOB and wheezes  This is despite using with sella  Would recommend that he use the albuterol every 4 hours while awake  He should do this for the next week or so, and then transition to as needed  If he is coughing, wheezing, feeling any short of breath, he should use the albuterol  Continue on Wixela b i d

## 2021-07-12 NOTE — PROGRESS NOTES
Assessment and Plan:     Problem List Items Addressed This Visit     None           Preventive health issues were discussed with patient, and age appropriate screening tests were ordered as noted in patient's After Visit Summary  Personalized health advice and appropriate referrals for health education or preventive services given if needed, as noted in patient's After Visit Summary       History of Present Illness:     Patient presents for Medicare Annual Wellness visit    Patient Care Team:  Michael Amador MD as PCP - General (Family Medicine)  Magdalena Buckner MD as PCP - Endocrinology (Endocrinology)  Tressa Guerra DO as PCP - [address] [place] [address] (RTE)  Michael Amador MD as PCP - [facility] (RTE)     Problem List:     Patient Active Problem List   Diagnosis    Atopic dermatitis    Anxiety    Bradycardia, sinus    DIEUDONNE (obstructive sleep apnea)    Cervical disc disease with myelopathy    Chronic pain disorder    CKD (chronic kidney disease)    Type 2 diabetes mellitus ([address] )    Coronary artery disease involving native coronary artery of native heart without angina pectoris    DDD (degenerative disc disease), lumbar    Depression    DNR (do not resuscitate)    Eczema    Encephalopathy acute    Hypertension    Hyperlipidemia associated with type 2 diabetes mellitus ([address] )    Increased frequency of urination    Iron deficiency anemia    Long term (current) use of systemic steroids    NSTEMI (non-ST elevated myocardial infarction) ([address] )    Prostate cancer ([address] )    Ptosis, bilateral    Chronic obstructive pulmonary disease ([address] )    Clear cell carcinoma of kidney ([address] )    Restless legs syndrome (RLS)    Retroperitoneal lymphadenopathy    Secondary adrenal insufficiency (HCC)    Tobacco abuse    Adenomatous duodenal polyp    Vitamin D deficiency    Hyperparathyroidism ([address] )    Hematuria    Hypertensive retinopathy, bilateral    Age-related nuclear cataract, bilateral    Age-related osteoporosis without current pathological fracture    Mild episode of recurrent major depressive disorder 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[address] )     JESSICA (acute kidney injury) ([place] [address] ) 9/7/2017    Nephrology: Dr Richard Davenport   CAD (coronary artery disease)     Cancer (HCC)     Chronic kidney disease     Chronic pain     Depression     Diabetes mellitus ([place] [address] )     Hyperlipidemia     Hypertension     Macular degeneration     Personal history of kidney cancer     Prostate cancer ([place] [address] )     Spinal cord injury, cervical region ([place] [address] )     Type 2 diabetes mellitus ([place] [address] )      Past Surgical History:   Procedure Laterality Date    CERVICAL SPINE SURGERY      COLONOSCOPY W/ BIOPSIES      LAMINECTOMY      PARTIAL NEPHRECTOMY Right 02/01/2017      Family History:     Family History   Problem Relation Age of Onset    Diabetes Mother     Heart attack Mother     Heart failure Mother     Diabetes Father     Heart attack Father     Lupus Sister     Diabetes Brother     Heart disease Brother       Social History:     Social History     Socioeconomic History    Marital status:       Spouse name: Not on file    Number of children: Not on file    Years of education: Not on file    Highest education level: Not on file   Occupational History    Occupation:    Tobacco Use    Smoking status: Current Every Day Smoker     Packs/day: 1 00     Years: 60 00     Pack years: 60 00     Types: Cigarettes    Smokeless tobacco: Never Used   Vaping Use    Vaping Use: Never used   Substance and Sexual Activity    Alcohol use: Not Currently    Drug use: Never    Sexual activity: Not on file   Other Topics Concern    Not on file   Social History Narrative    Not on file     Social Determinants of Health     Financial Resource Strain:     Difficulty of Paying Living Expenses:    Food Insecurity:     Worried About Running Out of [email or web address] in the Last Year:    [address] in the Last Year:    Transportation Needs:     Lack of Transportation (Medical):      Lack of Transportation (Non-Medical):    Physical Activity:     Days of Exercise per Week:     Minutes of Exercise per Session:    Stress:     Feeling of Stress :    Social Connections:     Frequency of Communication with Friends and Family:     Frequency of Social Gatherings with Friends and Family:     Attends Scientologist Services:     Active Member of Clubs or Organizations:     Attends Club or Organization Meetings:     Marital Status:    Intimate Partner Violence:     Fear of Current or Ex-Partner:     Emotionally Abused:     Physically Abused:     Sexually Abused:       Medications and Allergies:     Current Outpatient Medications   Medication Sig Dispense Refill    aspirin (ASPIRIN 81) 81 mg EC tablet Take 81 mg by mouth daily      atorvastatin (LIPITOR) 80 mg tablet Take 80 mg by mouth daily      carvedilol (COREG) 25 mg tablet Take 25 mg by mouth 2 (two) times a day with meals      Cholecalciferol (SM VITAMIN D3) 50 MCG (2000 UT) CAPS Take 1 capsule (2,000 Units total) by mouth daily 30 capsule 11    clopidogrel (PLAVIX) 75 mg tablet Take 75 mg by mouth daily      ferrous sulfate 325 (65 Fe) mg tablet Take 325 mg by mouth      hydrALAZINE (APRESOLINE) 25 mg tablet Take 25 mg by mouth 2 (two) times a day      mirtazapine (REMERON) 30 mg tablet Take 1 tablet (30 mg total) by mouth daily at bedtime 90 tablet 1    Multiple Vitamins-Minerals (Ocuvite-Lutein) CAPS Take 1 capsule by mouth daily      nitroglycerin (NITROSTAT) 0 4 mg SL tablet Place 0 4 mg under the tongue      oxyCODONE (ROXICODONE) 10 MG TABS       predniSONE 10 mg tablet Take 1 tablet (10 mg total) by mouth daily 30 tablet 5    albuterol (PROVENTIL HFA,VENTOLIN HFA) 90 mcg/act inhaler TAKE 2 PUFFS BY MOUTH EVERY 4 HOURS AS NEEDED FOR WHEEZE      Fluad Quadrivalent 0 5 ML [facility] ADMINISTERED (Patient not taking: Reported on 7/12/2021)      isosorbide mononitrate (IMDUR) 30 mg 24 hr tablet Take 30 mg by mouth daily      tacrolimus (PROTOPIC) 0 1 % ointment Apply topically twice daily to affected areas (Patient not taking: Reported on 11/6/2020) 100 g 3     Current Facility-Administered Medications   Medication Dose Route Frequency Provider Last Rate Last Admin    denosumab (PROLIA) subcutaneous injection 60 mg  60 mg Subcutaneous Q6 Months Mita PolkhenrikYOSEF   60 mg at 06/16/21 9492     Allergies   Allergen Reactions    Ace Inhibitors Hives     Renal cell ca with prior nephrectomy, CKD with JESSICA recently       Baclofen      Sedation toxicity     Bactrim [Sulfamethoxazole-Trimethoprim] Dizziness and Fatigue     Severe fatigue    Bupropion      Violent     Fentanyl Myalgia     "i lost control of my left arm and left leg"    Mirapex [Pramipexole] Other (See Comments)     Dark or brown urine    Norvasc [Amlodipine] Nausea Only and Other (See Comments)     Bad taste in mouth    Pregabalin      Weight gain, become manic    Topiramate      Increased pain and body aches       Immunizations:     Immunization History   Administered Date(s) Administered    INFLUENZA 11/27/2015, 11/11/2016, 09/25/2017, 10/03/2018    Influenza Split High Dose Preservative Free IM 11/11/2016, 09/25/2017, 12/01/2017, 11/01/2019    Influenza, seasonal, injectable, preservative free 09/16/2020    Pneumococcal Conjugate 13-Valent 07/28/2017    Pneumococcal Polysaccharide PPV23 02/07/2016    SARS-CoV-2 / COVID-19 mRNA IM (Shaye Cardenas) 02/09/2021, 03/08/2021    influenza, trivalent, adjuvanted 10/03/2018      Health Maintenance:         Topic Date Due    Lung Cancer Screening  Never done    Hepatitis C Screening  04/12/2022 (Originally 1945)    DXA SCAN  05/17/2023    Colorectal Cancer Screening  10/17/2023         Topic Date Due    Influenza Vaccine (1) 09/01/2021      Medicare Health Risk Assessment:     There were no vitals taken for this visit  Patrick Ferrer is here for his Subsequent Wellness visit  Last Medicare Wellness visit information reviewed, patient interviewed and updates made to the record today  Health Risk Assessment:   Patient rates overall health as fair  Patient feels that their physical health rating is same  Patient is satisfied with their life  Eyesight was rated as same  Hearing was rated as same  Patient feels that their emotional and mental health rating is same  Patients states they are sometimes angry  Patient states they are sometimes unusually tired/fatigued  Pain experienced in the last 7 days has been some  Patient's pain rating has been 4/10  Patient states that he has experienced no weight loss or gain in last 6 months  Depression Screening:   PHQ-2 Score: 0  PHQ-9 Score: 0      Fall Risk Screening: In the past year, patient has experienced: no history of falling in past year      Home Safety:  Patient does not have trouble with stairs inside or outside of their home  Patient has working smoke alarms and has working carbon monoxide detector  Home safety hazards include: none  Nutrition:   Current diet is Regular  Medications:   Patient is currently taking over-the-counter supplements  OTC medications include: see medication list  Patient is able to manage medications  Activities of Daily Living (ADLs)/Instrumental Activities of Daily Living (IADLs):   Walk and transfer into and out of bed and chair?: Yes  Dress and groom yourself?: Yes    Bathe or shower yourself?: Yes    Feed yourself?  Yes  Do your laundry/housekeeping?: Yes  Manage your money, pay your bills and track your expenses?: Yes  Make your own meals?: Yes    Do your own shopping?: Yes    Previous Hospitalizations:   Any hospitalizations or ED visits within the last 12 months?: Yes    How many hospitalizations have you had in the last year?: 1-2    Advance Care Planning:   Living will: No    Durable POA for healthcare: No    Advanced directive counseling given: Yes    Five wishes given: Yes    Patient declined ACP directive: No      Cognitive Screening:   Provider or family/friend/caregiver concerned regarding cognition?: No    PREVENTIVE SCREENINGS      Cardiovascular Screening:    General: Screening Current      Diabetes Screening:     General: Screening Not Indicated and History Diabetes      Colorectal Cancer Screening:     General: Screening Current      Prostate Cancer Screening:    General: History Prostate Cancer and Screening Not Indicated      Osteoporosis Screening:    General: Screening Not Indicated and History Osteoporosis      Abdominal Aortic Aneurysm (AAA) Screening:    Risk factors include: age between 73-67 yo and tobacco use        Lung Cancer Screening:     General: Screening Not Indicated      Hepatitis C Screening:    General: Screening Current      Preventive Screening Comments: COPD exacerbation, No Lung Ca Screen  Screening, Brief Intervention, and Referral to Treatment (SBIRT)    Screening  Typical number of drinks in a day: 0  Typical number of drinks in a week: 0  Interpretation: Low risk drinking behavior      Single Item Drug Screening:  How often have you used an illegal drug (including marijuana) or a prescription medication for non-medical reasons in the past year? never    Single Item Drug Screen Score: 0  Interpretation: Negative screen for possible drug use disorder    Review of Current Opioid Use    Opioid Risk Tool (ORT) Interpretation: Complete Opioid Risk Tool (ORT)      Rosi Severe, MD

## 2021-07-12 NOTE — ASSESSMENT & PLAN NOTE
Lab Results   Component Value Date    EGFR 18 04/20/2021    EGFR 20 12/07/2020    EGFR 19 10/01/2020    CREATININE 3 22 (H) 04/20/2021    CREATININE 2 92 (H) 12/07/2020    CREATININE 3 09 (H) 10/01/2020   Patient follows with Nephrology  Recommend refer to nephrology

## 2021-07-12 NOTE — PROGRESS NOTES
Assessment and Plan:    Problem List Items Addressed This Visit     Chronic obstructive pulmonary disease ([facility] ) - Primary     Patient with increased SOB and wheezes  This is despite using with sella  Would recommend that he use the albuterol every 4 hours while awake  He should do this for the next week or so, and then transition to as needed  If he is coughing, wheezing, feeling any short of breath, he should use the albuterol  Continue on Wixela b i d  Relevant Medications    albuterol (PROVENTIL HFA,VENTOLIN HFA) 90 mcg/act inhaler    fluticasone-salmeterol (Advair) 250-50 mcg/dose inhaler    cefuroxime (CEFTIN) 500 mg tablet    Other Relevant Orders    Ambulatory referral to Pulmonology    Ambulatory referral to Cardiology    CKD (chronic kidney disease)     Lab Results   Component Value Date    EGFR 18 04/20/2021    EGFR 20 12/07/2020    EGFR 19 10/01/2020    CREATININE 3 22 (H) 04/20/2021    CREATININE 2 92 (H) 12/07/2020    CREATININE 3 09 (H) 10/01/2020   Patient follows with Nephrology  Recommend refer to nephrology  Relevant Orders    Ambulatory referral to Nephrology    Basic metabolic panel    Ambulatory referral to Cardiology    Coronary artery disease involving native coronary artery of native heart without angina pectoris     Patient has had appointment set up in the past, but they keep getting canceled by Cardiology  Would recommend that he speak to another provider in that office  Imdur was added at [facility] during recent admission  Relevant Medications    clopidogrel (PLAVIX) 75 mg tablet    isosorbide mononitrate (IMDUR) 30 mg 24 hr tablet    Other Relevant Orders    Ambulatory referral to Cardiology    Hypertension     Blood pressure is clearly not controlled  Would recommend follow-up with Nephrology  They should be able to adjust medications  Of note, hospital it changed Coreg, as well as Imdur           Relevant Orders    Ambulatory referral to Nephrology    Basic metabolic panel    Ambulatory referral to Cardiology    Tobacco abuse     Patient reports he quit smoking now  Will follow in the future  Diagnoses and all orders for this visit:    Chronic obstructive pulmonary disease with acute exacerbation (HCC)  -     cefuroxime (CEFTIN) 500 mg tablet; Take 1 tablet (500 mg total) by mouth every 12 (twelve) hours for 10 days  -     Ambulatory referral to Pulmonology; Future  -     Ambulatory referral to Cardiology; Future    Stage 4 chronic kidney disease ([facility] )  -     Ambulatory referral to Nephrology; Future  -     Basic metabolic panel; Future  -     Ambulatory referral to Cardiology; Future    Coronary artery disease involving native coronary artery of native heart without angina pectoris  -     clopidogrel (PLAVIX) 75 mg tablet; Take 1 tablet (75 mg total) by mouth daily  -     Ambulatory referral to Cardiology; Future    Essential hypertension  -     Ambulatory referral to Nephrology; Future  -     Basic metabolic panel; Future  -     Ambulatory referral to Cardiology; Future    Tobacco abuse    Other orders  -     albuterol (PROVENTIL HFA,VENTOLIN HFA) 90 mcg/act inhaler; TAKE 2 PUFFS BY MOUTH EVERY 4 HOURS AS NEEDED FOR WHEEZE  -     isosorbide mononitrate (IMDUR) 30 mg 24 hr tablet; Take 30 mg by mouth daily  -     fluticasone-salmeterol (Advair) 250-50 mcg/dose inhaler; Inhale 1 puff 2 (two) times a day Rinse mouth after use  Subjective:      Patient ID: Sherman Velez is a 76 y o  male  CC:    Chief Complaint   Patient presents with    Dizziness     x 2weeks sinus drainage as well   mgb       HPI:    Patient recently in the hospital   After discharge, he noted that there was some congestion in the head, and echoing sensation in his head, dizziness when he walked  He felt this was related to sinus infection  Postnasal drip, runny nose, no sore throat  Denies any face or tooth pain    Again, this is only been since discharge from hospital     As far as hospital admission, the patient had COPD exacerbation  He does report that his grandson was near him, and had cold symptoms, which the patient then inherited  Unfortunately, it was worse, and he needed to be in the hospital for a couple of days at [facility]  Since discharge, the patient reports his breathing is excellent  Hypertension:  In the hospital, the patient was started on Imdur, Norvasc  Coreg was 25 b i d  Hydralazine t i d  He has seen Cardio before, and renal before  CKD: Does follow with renal   Sees Dr Louise Elizondo  The following portions of the patient's history were reviewed and updated as appropriate: allergies, current medications, past family history, past medical history, past social history, past surgical history and problem list       Review of Systems   Constitutional: Negative  HENT: Positive for congestion, postnasal drip, rhinorrhea and sinus pressure  Negative for sinus pain, sore throat and trouble swallowing  Respiratory: Positive for shortness of breath and wheezing  Gastrointestinal: Negative  Genitourinary: Negative  Musculoskeletal: Negative  All other systems reviewed and are negative  Data to review:       Objective:    Vitals:    07/12/21 1413   BP: 168/82   BP Location: Right arm   Patient Position: Sitting   Cuff Size: Standard   Pulse: 76   Temp: 98 1 °F (36 7 °C)   TempSrc: Temporal   Weight: 62 2 kg (137 lb 2 oz)   Height: 5' 7" (1 702 m)        Physical Exam  Vitals and nursing note reviewed  Constitutional:       Appearance: Normal appearance  Neck:      Vascular: No carotid bruit  Cardiovascular:      Rate and Rhythm: Normal rate and regular rhythm  Pulses: Normal pulses  Carotid pulses are 2+ on the right side and 2+ on the left side  Heart sounds: Normal heart sounds  No murmur heard  No gallop  Pulmonary:      Effort: Pulmonary effort is normal  No respiratory distress  Breath sounds: No stridor  Wheezing present  No rhonchi or rales  Chest:      Chest wall: No tenderness  Neurological:      Mental Status: He is alert

## 2021-07-12 NOTE — PATIENT INSTRUCTIONS
Problem List Items Addressed This Visit     Chronic obstructive pulmonary disease ([facility] [address] ) - Primary     Patient with increased SOB and wheezes  This is despite using with sella  Would recommend that he use the albuterol every 4 hours while awake  He should do this for the next week or so, and then transition to as needed  If he is coughing, wheezing, feeling any short of breath, he should use the albuterol  Continue on Wixela b i d  Relevant Medications    albuterol (PROVENTIL HFA,VENTOLIN HFA) 90 mcg/act inhaler    fluticasone-salmeterol (Advair) 250-50 mcg/dose inhaler    cefuroxime (CEFTIN) 500 mg tablet    Other Relevant Orders    Ambulatory referral to Pulmonology    Ambulatory referral to Cardiology    CKD (chronic kidney disease)     Lab Results   Component Value Date    EGFR 18 04/20/2021    EGFR 20 12/07/2020    EGFR 19 10/01/2020    CREATININE 3 22 (H) 04/20/2021    CREATININE 2 92 (H) 12/07/2020    CREATININE 3 09 (H) 10/01/2020   Patient follows with Nephrology  Recommend refer to nephrology  Relevant Orders    Ambulatory referral to Nephrology    Basic metabolic panel    Ambulatory referral to Cardiology    Coronary artery disease involving native coronary artery of native heart without angina pectoris     Patient has had appointment set up in the past, but they keep getting canceled by Cardiology  Would recommend that he speak to another provider in that office  Imdur was added at [facility] during recent admission  Relevant Medications    clopidogrel (PLAVIX) 75 mg tablet    isosorbide mononitrate (IMDUR) 30 mg 24 hr tablet    Other Relevant Orders    Ambulatory referral to Cardiology    Hypertension     Blood pressure is clearly not controlled  Would recommend follow-up with Nephrology  They should be able to adjust medications  Of note, hospital it changed Coreg, as well as Imdur           Relevant Orders    Ambulatory referral to Nephrology    Basic metabolic panel Ambulatory referral to Cardiology    Tobacco abuse     Patient reports he quit smoking now  Will follow in the future  COVID 19 Instructions    Christy Thrasher was advised to limit contact with others to essential tasks such as getting food, medications, and medical care  Proper handwashing reviewed, and Hand sanitzer when washing is not available  If the patient develops symptoms of COVID 19, the patient should call the office as soon as possible  For 5606-1211 Flu season, it is strongly recommended that Flu Vaccinations be obtained  Virtual Visits may be conducted in several ways: Dagoberto: You should get a text message when the provider is ready to see you  Click on the link in the text message, and the call should start  You will need to type in your name, and allow camera and microphone access  This is HIPPA compliant, and secure  Please try to download Google Duo  Once you do download this on your phone, you will be prompted to add your phone number to the account  After that, he should receive a text from [email or web address], and use that code to verify your phone number  After that, you should be able to use Google Duo to receive and make video calls  Please download Microsoft Teams to your phone or computer  You would get an email with the meeting after scheduling with the office  You will Join the meeting, and wait there till the provider joins as well  Instructions for downloading this are available from the office  This is HIPPA compliant, and secure  We are committed to getting you vaccinated as soon as possible and will be closely following CDC and [facility]  guidelines as they are released and revised  Please refer to our COVID-19 vaccine webpage for the most up to date information on the vaccine and our distribution efforts      [employer] tn    OUR NEW LOCATION:  Starting around [id number], our new location and phone are:    [place] [address] [facility] [address], [address] [facility]  [place], [address]  Fax: [phone]    Lab services and OB/GYN will be at this location as well

## 2021-07-12 NOTE — ASSESSMENT & PLAN NOTE
Blood pressure is clearly not controlled  Would recommend follow-up with Nephrology  They should be able to adjust medications  Of note, hospital it changed Coreg, as well as Imdur

## 2021-07-14 NOTE — TELEPHONE ENCOUNTER
PT WOULD LIKE TO TALK THE NURSING STAFF, HE CAN BE REACHED -[phone]  REFUSED TO MAKE AN APPT, PT STATES HE IS REALLY REALLY SICK    PLEASE CALL BACK [phone]

## 2021-07-14 NOTE — TELEPHONE ENCOUNTER
Spoke with patient  He is very dizzy and has diarrhea this morning  I advised he not take any more of the antibiotic  He said he did take the dose this morning and feels worse ever since  I asked if he feels bad enough to go to the hospital and he said no  He said he was ok to wait for Dr Jovana Rodrigues advice after 12:00 today  Please advise

## 2021-07-14 NOTE — TELEPHONE ENCOUNTER
Patient notified  He said he is not going to go to the ER  He thinks his symptoms are from the antibiotic  He is going to rest today and see how he feels tomorrow

## 2021-07-30 NOTE — PATIENT INSTRUCTIONS
Cervical Radiculopathy   WHAT YOU NEED TO KNOW:   What is cervical radiculopathy? Cervical radiculopathy is a painful condition that happens when a spinal nerve in your neck is pinched or irritated  What causes cervical radiculopathy? Changes in the vertebrae (bones) and discs in your neck can put pressure on a spinal nerve  Discs are natural, spongy cushions between your vertebrae that allow your spine to move  The following can cause a pinched nerve:  · Disc damage  may occur if a disc flattens, bulges, or moves over time  An injury can also cause disc damage  · Cervical spondylosis  is when the vertebrae in your neck break down  This normally occurs as you age  · Growths , such as tumors or cysts (fluid-filled lumps), may grow and press on the nerve  What are the signs and symptoms of cervical radiculopathy? The most common symptom is sharp pain that travels from your neck all the way down your arm  You may have pain in your shoulder, chest, and hand  The pain may get worse with movement or when you cough or sneeze  You may also have any of the following:  · Burning or tingling sensations in your neck or arm     · Numbness or weakness in your arm or hand that makes it hard for you to  objects    · Headaches    How is cervical radiculopathy diagnosed? Your healthcare provider will ask when and how your symptoms began  He will gently press on your neck to check for tenderness and areas that are not shaped correctly  He will also check your arms and hands for numbness or weakness  You may have any of the following:  · Provocative tests  are done to check your response to certain movements  He will ask you to move your neck, shoulder, and arm in different ways  Some movements will increase your symptoms, while others will make you feel better  · An x-ray  is a picture of the bones and tissues in your neck  · An MRI or a CT scan  may be used to take pictures of your neck   The pictures can show problems and changes in your nerves, discs, and vertebrae  You may be given dye to help the pictures show up better  Tell the healthcare provider if you have ever had an allergic reaction to contrast dye  Do not enter the MRI room with anything metal  Metal can cause serious injury  Tell the healthcare provider if you have any metal in or on your body  · An electromyography  is also called an EMG  An EMG is done to test the function of your muscles and the nerves that control them  Electrodes (wires) are placed on the muscle being tested  Needles may be attached to the electrodes and placed in your skin  The electrical activity of your muscles and nerves is measured by a machine attached to the electrodes  Your muscles are tested at rest and during movement  How is cervical radiculopathy treated? · NSAIDs  decrease swelling and pain  This medicine can be bought without a doctor's order  This medicine can cause stomach bleeding or kidney problems in certain people  If you take blood thinner medicine, always ask your healthcare provider if NSAIDs are safe for you  Always read the medicine label and follow the directions on it before using this medicine  · Prescription pain medicine  may be given to decrease pain  Do not wait until the pain is severe before you take this medicine  · Steroids  help decrease pain and swelling  These may be given as a pill or as an injection in your neck  You may need more than 1 injection if your symptoms do not improve after the first treatment  · Physical therapy  helps stretch and strengthen your muscles  Your physical therapist can teach you how to improve your posture and the way you hold your neck  He may also teach you how to be safely active and avoid further injury  He can also help you develop an exercise program that is safe for your back and neck       · Surgery  may be used to treat a pinched nerve if other treatments have not helped after 6 to 12 weeks  How can I manage my symptoms? · Ice  helps decrease swelling and pain  Ice may also help prevent tissue damage  Use an ice pack, or put crushed ice in a plastic bag  Cover it with a towel and place it on your neck for 15 to 20 minutes every hour or as directed  · Rest  when you feel it is needed  Slowly start to do more each day  Return to your daily activities as directed  · Wear a soft collar  You may be given a soft collar to support your neck while you sleep  Wear the soft collar only as directed  · Do light stretches and regular exercise  Your healthcare provider may suggest light stretches to help decrease stiffness in your neck and arm as you recover  After your pain is controlled, you may benefit from regular exercise  Ask what type of exercise is safe for your back and neck  · Review your work area  A comfortable work area can help prevent neck strain  Ask your employer for an ergonomic review to check the position of your desk, chair, phone, and computer  Make any necessary adjustments for your comfort  When should I contact my healthcare provider? · You are losing weight without trying  · Your pain is worse, even with medicine  · One or both hands feel more numb than before, or you cannot move your fingers well  · You have questions or concerns about your condition or care  CARE AGREEMENT:   You have the right to help plan your care  Learn about your health condition and how it may be treated  Discuss treatment options with your healthcare providers to decide what care you want to receive  You always have the right to refuse treatment  The above information is an  only  It is not intended as medical advice for individual conditions or treatments  Talk to your doctor, nurse or pharmacist before following any medical regimen to see if it is safe and effective for you    © Copyright [employer] 2021 Information is for End User's use only and may not be sold, redistributed or otherwise used for commercial purposes   All illustrations and images included in CareNotes® are the copyrighted property of A D A M , Inc  or [facility] Noemi Grissom

## 2021-07-30 NOTE — PROGRESS NOTES
Assessment  1  Chronic pain syndrome    2  Cervical disc disorder with radiculopathy of mid-cervical region    3  Neural foraminal stenosis of cervical spine    4  Neck pain        Plan  Mr Raheem Wood is a pleasant 80-year-old male who presents for follow-up and re-evaluation after an MRI cervical spine which demonstrates multilevel moderate to severe foraminal stenosis as well as the postsurgical changes as identified on C5-C6 and C6-C7  Medication management is difficult in light of the patient's comorbidities including chronic kidney disease and patient is not interested in further surgical evaluation  At this time we will   1  Plan for cervical epidural steroid injection under fluoro guidance   2  Complete risks and benefits including bleeding, infection, tissue reaction, nerve injury and allergic reaction were discussed  The approach was demonstrated using models and literature was provided  Verbal and written consent was obtained  My impressions and treatment recommendations were discussed in detail with the patient who verbalized understanding and had no further questions  Discharge instructions were provided  I personally saw and examined the patient and I agree with the above discussed plan of care  Orders Placed This Encounter   Procedures    FL spine and pain procedure     Standing Status:   Future     Standing Expiration Date:   7/30/2025     Order Specific Question:   Reason for Exam:     Answer:   HEATHER     Order Specific Question:   Anticoagulant hold needed? Answer:   Yes plavix and ASA     No orders of the defined types were placed in this encounter  History of Present Illness    Felicia Quispe is a 76 y o  male presents to [facility] for follow-up and re-evaluation regarding neck pain with radiating symptoms into the left worse than right upper extremity currently rates his pain 6/10 and interfering with daily activities    Pain is described as a intermittent numbness, dull aching, throbbing, shooting pain that is worse in the morning and evening  Presents today for follow-up and re-evaluation  I have personally reviewed and/or updated the patient's past medical history, past surgical history, family history, social history, current medications, allergies, and vital signs today  Review of Systems   Respiratory: Negative for shortness of breath  Cardiovascular: Negative for chest pain  Gastrointestinal: Negative for constipation, diarrhea, nausea and vomiting  Musculoskeletal: Positive for myalgias, neck pain and neck stiffness  Negative for arthralgias, gait problem and joint swelling  Skin: Negative for rash  Neurological: Negative for dizziness, seizures and weakness  All other systems reviewed and are negative        Patient Active Problem List   Diagnosis    Atopic dermatitis    Anxiety    Bradycardia, sinus    DIEUDONNE (obstructive sleep apnea)    Cervical disc disease with myelopathy    Chronic pain disorder    CKD (chronic kidney disease)    Type 2 diabetes mellitus ([facility] [address] )    Coronary artery disease involving native coronary artery of native heart without angina pectoris    DDD (degenerative disc disease), lumbar    Depression    DNR (do not resuscitate)    Eczema    Encephalopathy acute    Hypertension    Hyperlipidemia associated with type 2 diabetes mellitus ([facility] [address] )    Increased frequency of urination    Iron deficiency anemia    Long term (current) use of systemic steroids    NSTEMI (non-ST elevated myocardial infarction) ([facility] [address] )    Prostate cancer (HCC)    Ptosis, bilateral    Chronic obstructive pulmonary disease (HCC)    Clear cell carcinoma of kidney (HCC)    Restless legs syndrome (RLS)    Retroperitoneal lymphadenopathy    Secondary adrenal insufficiency (HCC)    Tobacco abuse    Adenomatous duodenal polyp    Vitamin D deficiency    Hyperparathyroidism ([facility] [address] )    Hematuria    Hypertensive retinopathy, bilateral    Age-related nuclear cataract, bilateral    Age-related osteoporosis without current pathological fracture    Mild episode of recurrent major depressive disorder ([place] [address] )       Past Medical History:   Diagnosis Date    [place]'s disease ([place] [address] )     JESSICA (acute kidney injury) ([place] [address] ) 2017    Nephrology: Dr Fei Narvaez      CAD (coronary artery disease)     Cancer (HCC)     Chronic kidney disease     Chronic pain     Depression     Diabetes mellitus ([place] [address] )     Hyperlipidemia     Hypertension     Macular degeneration     Personal history of kidney cancer     Prostate cancer ([place] [address] )     Spinal cord injury, cervical region ([place] [address] )     Type 2 diabetes mellitus ([place] [address] )        Past Surgical History:   Procedure Laterality Date    CERVICAL SPINE SURGERY      COLONOSCOPY W/ BIOPSIES      LAMINECTOMY      PARTIAL NEPHRECTOMY Right 2017    SPINE SURGERY         Family History   Problem Relation Age of Onset    Diabetes Mother     Heart attack Mother     Heart failure Mother     Diabetes Father     Heart attack Father     Lupus Sister     Diabetes Brother     Heart disease Brother        Social History     Occupational History    Occupation:    Tobacco Use    Smoking status: Current Every Day Smoker     Packs/day: 1 00     Years: 35 00     Pack years: 35 00     Types: Cigars     Last attempt to quit: 3/14/2021     Years since quittin 3    Smokeless tobacco: Never Used    Tobacco comment: On and off smoker   Vaping Use    Vaping Use: Never used   Substance and Sexual Activity    Alcohol use: Not Currently     Alcohol/week: 0 0 standard drinks    Drug use: Not Currently    Sexual activity: Not Currently     Partners: Female       Current Outpatient Medications on File Prior to Visit   Medication Sig    albuterol (PROVENTIL HFA,VENTOLIN HFA) 90 mcg/act inhaler TAKE 2 PUFFS BY MOUTH EVERY 4 HOURS AS NEEDED FOR WHEEZE    aspirin (ASPIRIN 81) 81 mg EC tablet Take 81 mg by mouth daily    atorvastatin (LIPITOR) 80 mg tablet Take 80 mg by mouth daily    carvedilol (COREG) 25 mg tablet Take 25 mg by mouth 2 (two) times a day with meals    Cholecalciferol (SM VITAMIN D3) 50 MCG (2000 UT) CAPS Take 1 capsule (2,000 Units total) by mouth daily    clopidogrel (PLAVIX) 75 mg tablet Take 1 tablet (75 mg total) by mouth daily    ferrous sulfate 325 (65 Fe) mg tablet Take 325 mg by mouth    hydrALAZINE (APRESOLINE) 25 mg tablet Take 25 mg by mouth 2 (two) times a day    isosorbide mononitrate (IMDUR) 30 mg 24 hr tablet Take 30 mg by mouth daily    mirtazapine (REMERON) 30 mg tablet Take 1 tablet (30 mg total) by mouth daily at bedtime    Multiple Vitamins-Minerals (Ocuvite-Lutein) CAPS Take 1 capsule by mouth daily    nitroglycerin (NITROSTAT) 0 4 mg SL tablet Place 0 4 mg under the tongue    oxyCODONE (ROXICODONE) 10 MG TABS     predniSONE 10 mg tablet Take 1 tablet (10 mg total) by mouth daily    Fluad Quadrivalent 0 5 ML PRSY PHARMACY ADMINISTERED (Patient not taking: Reported on 7/12/2021)    fluticasone-salmeterol (Advair) 250-50 mcg/dose inhaler Inhale 1 puff 2 (two) times a day Rinse mouth after use  (Patient not taking: Reported on 7/30/2021)    tacrolimus (PROTOPIC) 0 1 % ointment Apply topically twice daily to affected areas (Patient not taking: Reported on 11/6/2020)     Current Facility-Administered Medications on File Prior to Visit   Medication    denosumab (PROLIA) subcutaneous injection 60 mg       Allergies   Allergen Reactions    Ace Inhibitors Hives     Renal cell ca with prior nephrectomy, CKD with JESSICA recently       Baclofen      Sedation toxicity     Bactrim [Sulfamethoxazole-Trimethoprim] Dizziness and Fatigue     Severe fatigue    Bupropion      Violent     Fentanyl Myalgia     "i lost control of my left arm and left leg"    Mirapex [Pramipexole] Other (See Comments)     Dark or brown urine    Norvasc [Amlodipine] Nausea Only and Other (See Comments) Bad taste in mouth    Pregabalin      Weight gain, become manic    Topiramate      Increased pain and body aches        Physical Exam    /60   Pulse 67   Temp 97 9 °F (36 6 °C)   Ht 5' 7" (1 702 m)   Wt 64 kg (141 lb)   BMI 22 08 kg/m²     Constitutional: normal, well developed, well nourished, alert, in no distress and non-toxic and no overt pain behavior  Eyes: anicteric  HEENT: grossly intact  Neck: supple, symmetric, trachea midline and no masses   Pulmonary:even and unlabored  Cardiovascular:No edema or pitting edema present  Skin:Normal without rashes or lesions and well hydrated  Psychiatric:Mood and affect appropriate  Neurologic:Cranial Nerves II-XII grossly intact  Musculoskeletal:normal gait, tenderness to palpation bilateral cervical paraspinals, decreased active and passive range of motion cervical flexion and extension limited by pain, positive Spurling with radicular symptoms into the left upper extremity, MMT unchanged from previous evaluation  Imaging  Impression    Impression:   1  Postsurgical changes in the cervical spine, as described in detail above  Stable chronic myelomalacia in the spinal cord at the C4-C5 and C5-C6 levels  No   new cord signal abnormality is identified  2  Neural foraminal narrowing at C6-C7 and C7-T1, similar to the prior study  Workstation:[id number]  Narrative    History: Neck pain, prior surgery     Procedure: MRI of the cervical spine without contrast     Technique: Sagittal T1, T2 and STIR as well as axial T2-weighted images through   the cervical spine     Comparison: MRI dated 8/19/2016     Findings: There is slight reversal of the expected cervical lordosis  The craniocervical   junction is intact  Vertebral body heights are maintained  There are   postsurgical changes, with laminectomy defects at C3-C6 as well as posterior   fusion through this segment  There is prior ACDF at C4-C6   Please note that   spinal hardware cannot be assessed on MRI due to susceptibility artifact  Modic   type II degenerative endplate changes are noted at C7-T1  The visualized   posterior fossa structures are grossly intact  There is stable focal T2   hyperintense signal in the spinal cord at the C4-C5 level and the C5-C6 level,   likely reflecting chronic myelomalacia  No new signal abnormality is noted in   the cervical spinal cord  C2-3: No spinal stenosis  There is facet arthrosis and uncovertebral   hypertrophy, with mild left and moderate right neural foraminal narrowing  C3-4: Posterior decompression without recurrent spinal canal narrowing  There   are marginal osteophytes, with mild left neural foraminal narrowing  C4-5: Postsurgical osseous fusion across the disc space  Posterior decompression   without recurrent spinal stenosis  There are marginal osteophytes, with mild   left neural foraminal narrowing  C5-6: Postsurgical fusion across the disc space with a small posterior ossific   ridge which indents the ventral thecal sac  There is posterior decompression,   without recurrent spinal canal narrowing  There are marginal osteophytes, with   mild bilateral neural foraminal narrowing  C6-7: Disc space narrowing with a circumferential disc osteophyte complex  There   is focal prominence of ligamentum flavum  The spinal canal is mildly narrowed,   without spinal cord impingement  There are marginal osteophytes, with severe   bilateral neural foraminal narrowing  C7-T1: Disc space narrowing with a circumferential disc osteophyte complex  No   spinal canal narrowing  There are marginal osteophytes, with mild left and   moderate to severe right neural foraminal narrowing  Visualized Upper Thoracic Spine: Mild spinal canal narrowing at T1-T2  There is   moderate left neural foraminal narrowing at this level as well     Paraspinal Soft Tissues: Grossly unremarkable  Other Result Text    Lasha Avilez MD - 07/17/2021   Formatting of this note might be different from the original    History: Neck pain, prior surgery     Procedure: MRI of the cervical spine without contrast     Technique: Sagittal T1, T2 and STIR as well as axial T2-weighted images through   the cervical spine     Comparison: MRI dated 8/19/2016     Findings: There is slight reversal of the expected cervical lordosis  The craniocervical   junction is intact  Vertebral body heights are maintained  There are   postsurgical changes, with laminectomy defects at C3-C6 as well as posterior   fusion through this segment  There is prior ACDF at C4-C6  Please note that   spinal hardware cannot be assessed on MRI due to susceptibility artifact  Modic   type II degenerative endplate changes are noted at C7-T1  The visualized   posterior fossa structures are grossly intact  There is stable focal T2   hyperintense signal in the spinal cord at the C4-C5 level and the C5-C6 level,   likely reflecting chronic myelomalacia  No new signal abnormality is noted in   the cervical spinal cord  C2-3: No spinal stenosis  There is facet arthrosis and uncovertebral   hypertrophy, with mild left and moderate right neural foraminal narrowing  C3-4: Posterior decompression without recurrent spinal canal narrowing  There   are marginal osteophytes, with mild left neural foraminal narrowing  C4-5: Postsurgical osseous fusion across the disc space  Posterior decompression   without recurrent spinal stenosis  There are marginal osteophytes, with mild   left neural foraminal narrowing  C5-6: Postsurgical fusion across the disc space with a small posterior ossific   ridge which indents the ventral thecal sac  There is posterior decompression,   without recurrent spinal canal narrowing  There are marginal osteophytes, with   mild bilateral neural foraminal narrowing  C6-7: Disc space narrowing with a circumferential disc osteophyte complex  There   is focal prominence of ligamentum flavum  The spinal canal is mildly narrowed,   without spinal cord impingement  There are marginal osteophytes, with severe   bilateral neural foraminal narrowing  C7-T1: Disc space narrowing with a circumferential disc osteophyte complex  No   spinal canal narrowing  There are marginal osteophytes, with mild left and   moderate to severe right neural foraminal narrowing  Visualized Upper Thoracic Spine: Mild spinal canal narrowing at T1-T2  There is   moderate left neural foraminal narrowing at this level as well  Paraspinal Soft Tissues: Grossly unremarkable     IMPRESSION:   Impression:   1  Postsurgical changes in the cervical spine, as described in detail above  Stable chronic myelomalacia in the spinal cord at the C4-C5 and C5-C6 levels  No   new cord signal abnormality is identified  2  Neural foraminal narrowing at C6-C7 and C7-T1, similar to the prior study             Workstation:[id number]     Date: 07/17/21   Received From: [facility]     Encounter Summary

## 2021-07-30 NOTE — TELEPHONE ENCOUNTER
Patient   [phone]  Dr Cheung    Patient said that he needs to get scheduled for an injection in his neck  However he needs to stop his meds first  Pt said that he did speak with his Cardio Dr Colin Mandel told him that our office needs to reach out Please follow up       Dr Suellen Leslie office  Phone: [phone]  Fax: [phone]

## 2021-07-30 NOTE — TELEPHONE ENCOUNTER
Explained to patient prior to him leaving that I would reach out to his doctors for approval for medication holds    plavix and asa holds are required for HEATHER

## 2021-08-06 PROBLEM — E78.00 PURE HYPERCHOLESTEROLEMIA: Status: ACTIVE | Noted: 2021-01-01

## 2021-08-06 PROBLEM — I21.4 NSTEMI (NON-ST ELEVATED MYOCARDIAL INFARCTION) (HCC): Status: RESOLVED | Noted: 2017-04-13 | Resolved: 2021-01-01

## 2021-08-06 PROBLEM — I25.2 HISTORY OF NON-ST ELEVATION MYOCARDIAL INFARCTION (NSTEMI): Status: ACTIVE | Noted: 2021-01-01

## 2021-08-06 NOTE — TELEPHONE ENCOUNTER
Noted in media on 8/2- Dr Alva Albarado note on Anti-coag hold form advised contacting pt's Cardiologist for hold requests  Hold requests faxed to Dr Giovanni Alvarez  for Aspirin and Plavix  Fax [phone]

## 2021-08-09 NOTE — LETTER
2021     Enrique Keenan MD  [address] [address]    Patient: Bree Elias   YOB: 1945   Date of Visit: 2021       Dear Dr Ivania Rodriguez: Thank you for referring Bree Elias to me for evaluation  Below are my notes for this consultation  If you have questions, please do not hesitate to call me  I look forward to following your patient along with you  Sincerely,        Marylin Anderson MD        CC: No Recipients  Marylin Anderson MD  2021 10:40 AM  Sign when Signing Visit     [facility]  [address] [place] [address], [place] [address]  Phone#  [phone]   Fax#  [phone]  *-*-*-*-*-*-*-*-*-*-*-*-*-*-*-*-*-*-*-*-*-*-*-*-*-*-*-*-*-*-*-*-*-*-*-*-*-*-*-*-*-*-*-*-*-*-*-*-*-*-*-*-*-*                                              Cardiology Consultation       ENCOUNTER DATE: 21 10:02 AM  PATIENT NAME: Bree Elias   : 1945    MRN: [id number]  AGE:75 y o  SEX: male  [address]issom MD     PRIMARY CARE PHYSICIAN: Enrique Keenan MD    ACTIVE DIAGNOSIS THIS VISIT  1  Coronary artery disease involving native coronary artery of native heart without angina pectoris  Ambulatory referral to Cardiology   2  History of non-ST elevation myocardial infarction (NSTEMI)     3  Pure hypercholesterolemia     4  Essential hypertension  Ambulatory referral to Cardiology    POCT ECG   5  Iron deficiency anemia, unspecified iron deficiency anemia type     6  Tobacco abuse     7  DIEUDONNE (obstructive sleep apnea)     8  Type 2 diabetes mellitus with stage 4 chronic kidney disease, without long-term current use of insulin ([facility] )     9  Chronic obstructive pulmonary disease with acute exacerbation [facility])  Ambulatory referral to Cardiology   10   Stage 4 chronic kidney disease [facility])  Ambulatory referral to Cardiology     ACTIVE PROBLEM LIST  Patient Active Problem List   Diagnosis    Atopic dermatitis    Anxiety    Bradycardia, sinus  DIEUDONNE (obstructive sleep apnea)    Cervical disc disease with myelopathy    Chronic pain disorder    CKD (chronic kidney disease)    Type 2 diabetes mellitus ([facility] )    Coronary artery disease involving native coronary artery of native heart without angina pectoris    DDD (degenerative disc disease), lumbar    Depression    DNR (do not resuscitate)    Eczema    Encephalopathy acute    Hypertension    Hyperlipidemia associated with type 2 diabetes mellitus ([facility] )    Increased frequency of urination    Iron deficiency anemia    Long term (current) use of systemic steroids    Prostate cancer (HCC)    Ptosis, bilateral    Chronic obstructive pulmonary disease (HCC)    Clear cell carcinoma of kidney (HCC)    Restless legs syndrome (RLS)    Retroperitoneal lymphadenopathy    Secondary adrenal insufficiency (HCC)    Tobacco abuse    Adenomatous duodenal polyp    Vitamin D deficiency    Hyperparathyroidism (HCC)    Hematuria    Hypertensive retinopathy, bilateral    Age-related nuclear cataract, bilateral    Age-related osteoporosis without current pathological fracture    Mild episode of recurrent major depressive disorder ([facility] )    History of non-ST elevation myocardial infarction (NSTEMI)    Pure hypercholesterolemia       HPI:    Patient is a 80-year-old male who had a myocardial infarction involving his 1st obtuse marginal branch of the circumflex in April 2017 which was then stented  He had a 2nd myocardial infarction in November 2018 which involved the 2nd obtuse marginal is circumflex artery which was also stented  The condition of the other arteries of his heart are not known and I have requested cardiac catheterization reports and CDs  Patient denies cardiac symptoms  Patient denies chest discomfort or shortness of breath  Patient has no palpitations  Patient denies symptoms of dizziness, lightheadedness or near-syncope/syncope  Patient denies leg edema    Patient denies symptoms of orthopnea or paroxysmal nocturnal dyspnea  He  has had 2 neck operations with considerable metal in his neck and constant pain for which he takes narcotics  He has significant cervical disc disease and myelopathy  He has clear cell carcinoma of the kidney and prostate cancer  He has had long-term use of corticosteroids  He was hospitalized in June 2021 with COPD exacerbation  After discharge he was debilitated and had severe orthostatic hypotension  This has been gradually improving and he is now 90% better  Patient is a long-term cigarette smoker  He has tried to quit many times but is now discouraged concerning his kidney tumor and it is not best time to quit        DISCUSSION/PLAN:    Return in 3 months    Lab Studies:    Lab Results   Component Value Date    CHOLESTEROL 152 04/20/2021    CHOLESTEROL 162 10/01/2020    CHOLESTEROL 171 01/15/2020     Lab Results   Component Value Date    TRIG 141 10/01/2020    TRIG 140 01/15/2020     Lab Results   Component Value Date    HDL 48 04/20/2021    HDL 48 10/01/2020    HDL 42 01/15/2020     Lab Results   Component Value Date    LDLCALC 86 10/01/2020    LDLCALC 101 (H) 01/15/2020     No results found for: NONHDL  Lab Results   Component Value Date    LDLDIRECT 89 04/20/2021         Lab Results   Component Value Date    HGBA1C 5 6 06/28/2021      Lab Results   Component Value Date    EGFR 18 04/20/2021    EGFR 20 12/07/2020    EGFR 19 10/01/2020    SODIUM 143 04/20/2021    SODIUM 143 12/07/2020    SODIUM 144 10/01/2020    K 5 0 04/20/2021    K 5 3 12/07/2020    K 4 9 10/01/2020     (H) 04/20/2021     (H) 12/07/2020     (H) 10/01/2020    CO2 23 04/20/2021    CO2 24 12/07/2020    CO2 21 10/01/2020    BUN 48 (H) 04/20/2021    BUN 78 (H) 12/07/2020    BUN 69 (H) 10/01/2020    CREATININE 3 22 (H) 04/20/2021    CREATININE 2 92 (H) 12/07/2020    CREATININE 3 09 (H) 10/01/2020     Lab Results   Component Value Date    WBC 8 62 12/07/2020    WBC 7 73 01/15/2020    HGB 10 2 (L) 12/07/2020    HGB 11 7 (L) 01/15/2020    HCT 33 4 (L) 12/07/2020    HCT 38 2 01/15/2020    MCV 97 12/07/2020    MCV 93 01/15/2020    MCH 29 5 12/07/2020    MCH 28 5 01/15/2020    MCHC 30 5 (L) 12/07/2020    MCHC 30 6 (L) 01/15/2020     12/07/2020     01/15/2020        Lab Results   Component Value Date    CALCIUM 8 6 04/20/2021    CALCIUM 9 5 12/07/2020    CALCIUM 8 9 10/01/2020    AST 11 04/20/2021    AST 10 10/01/2020    AST 13 04/02/2020    ALT 15 04/20/2021    ALT 19 10/01/2020    ALT 17 04/02/2020    ALKPHOS 50 04/20/2021    ALKPHOS 62 10/01/2020    ALKPHOS 68 04/02/2020       Lab Results   Component Value Date    FERRITIN 28 12/07/2020    IRON 38 (L) 12/07/2020    TIBC 266 12/07/2020         Current Outpatient Medications:     albuterol (PROVENTIL HFA,VENTOLIN HFA) 90 mcg/act inhaler, TAKE 2 PUFFS BY MOUTH EVERY 4 HOURS AS NEEDED FOR WHEEZE, Disp: , Rfl:     aspirin (ASPIRIN 81) 81 mg EC tablet, Take 81 mg by mouth daily, Disp: , Rfl:     atorvastatin (LIPITOR) 80 mg tablet, Take 80 mg by mouth daily, Disp: , Rfl:     carvedilol (COREG) 25 mg tablet, Take 25 mg by mouth 2 (two) times a day with meals, Disp: , Rfl:     Cholecalciferol ( VITAMIN D3) 50 MCG (2000 UT) CAPS, Take 1 capsule (2,000 Units total) by mouth daily, Disp: 30 capsule, Rfl: 11    clopidogrel (PLAVIX) 75 mg tablet, Take 1 tablet (75 mg total) by mouth daily, Disp: 90 tablet, Rfl: 3    ferrous sulfate 325 (65 Fe) mg tablet, Take 325 mg by mouth, Disp: , Rfl:     hydrALAZINE (APRESOLINE) 25 mg tablet, Take 25 mg by mouth 2 (two) times a day, Disp: , Rfl:     isosorbide mononitrate (IMDUR) 30 mg 24 hr tablet, Take 30 mg by mouth daily, Disp: , Rfl:     mirtazapine (REMERON) 30 mg tablet, Take 1 tablet (30 mg total) by mouth daily at bedtime, Disp: 90 tablet, Rfl: 1    Multiple Vitamins-Minerals (Ocuvite-Lutein) CAPS, Take 1 capsule by mouth daily, Disp: , Rfl:    nitroglycerin (NITROSTAT) 0 4 mg SL tablet, Place 0 4 mg under the tongue, Disp: , Rfl:     oxyCODONE (ROXICODONE) 10 MG TABS, , Disp: , Rfl:     predniSONE 10 mg tablet, Take 1 tablet (10 mg total) by mouth daily, Disp: 30 tablet, Rfl: 5    Fluad Quadrivalent 0 5 ML PRSY, PHARMACY ADMINISTERED (Patient not taking: Reported on 7/12/2021), Disp: , Rfl:     fluticasone-salmeterol (Advair) 250-50 mcg/dose inhaler, Inhale 1 puff 2 (two) times a day Rinse mouth after use  (Patient not taking: Reported on 7/30/2021), Disp: , Rfl:     tacrolimus (PROTOPIC) 0 1 % ointment, Apply topically twice daily to affected areas (Patient not taking: Reported on 11/6/2020), Disp: 100 g, Rfl: 3    Current Facility-Administered Medications:     denosumab (PROLIA) subcutaneous injection 60 mg, 60 mg, Subcutaneous, Q6 Months, FerchoYOSEF Perdue, 60 mg at 06/16/21 8927  Allergies   Allergen Reactions    Ace Inhibitors Hives     Renal cell ca with prior nephrectomy, CKD with JESSICA recently   Baclofen      Sedation toxicity     Bactrim [Sulfamethoxazole-Trimethoprim] Dizziness and Fatigue     Severe fatigue    Bupropion      Violent     Fentanyl Myalgia     "i lost control of my left arm and left leg"    Mirapex [Pramipexole] Other (See Comments)     Dark or brown urine    Norvasc [Amlodipine] Nausea Only and Other (See Comments)     Bad taste in mouth    Pregabalin      Weight gain, become manic    Topiramate      Increased pain and body aches        Past Medical History:   Diagnosis Date    [facility]     JESSICA [facility]acute kidney [facility]) [facility] ) 9/7/2017    Nephrology: Dr Lopez Marrero      CAD (coronary artery disease)     Cancer (HCC)     Chronic kidney disease     Chronic pain     Depression     Diabetes mellitus ([facility] )     Hyperlipidemia     Hypertension     Macular degeneration     Personal history of kidney cancer     Prostate cancer ([facility] )     Spinal cord injury, cervical region ([facility] )     Type [facility]      Social History     Socioeconomic History    Marital status:      Spouse name: Not on file    Number of children: Not on file    Years of education: Not on file    Highest education level: Not on file   Occupational History    Occupation:    Tobacco Use    Smoking status: Current Every Day Smoker     Packs/day: 1 00     Years: 35 00     Pack years: 35 00     Types: Cigars     Last attempt to quit: 3/14/2021     Years since quittin 4    Smokeless tobacco: Never Used    Tobacco comment: On and off smoker   Vaping Use    Vaping Use: Never used   Substance and Sexual Activity    Alcohol use: Not Currently     Alcohol/week: 0 0 standard drinks    Drug use: Not Currently    Sexual activity: Not Currently     Partners: Female   Other Topics Concern    Not on file   Social History Narrative    Not on file     Social Determinants of Health     Financial Resource Strain:     Difficulty of Paying Living Expenses:    Food Insecurity:     Worried About Running Out of Food in the Last Year:     [address] in the Last Year:    Transportation Needs:     Lack of Transportation (Medical):      Lack of Transportation (Non-Medical):    Physical Activity:     Days of Exercise per Week:     Minutes of Exercise per Session:    Stress:     Feeling of Stress :    Social Connections:     Frequency of Communication with Friends and Family:     Frequency of Social Gatherings with Friends and Family:     Attends Yarsanism Services:     Active Member of Clubs or Organizations:     Attends Club or Organization Meetings:     Marital Status:    Intimate Partner Violence:     Fear of Current or Ex-Partner:     Emotionally Abused:     Physically Abused:     Sexually Abused:       Family History   Problem Relation Age of Onset    Diabetes Mother     Heart attack Mother     Heart failure Mother     Diabetes Father     Heart attack Father     Lupus Sister     Diabetes Brother     Heart disease Brother      Past Surgical History:   Procedure Laterality Date    CERVICAL SPINE SURGERY      COLONOSCOPY W/ BIOPSIES      LAMINECTOMY      PARTIAL NEPHRECTOMY Right 02/01/2017    SPINE SURGERY  2003       Review of Systems:  Review of Systems   Constitutional: Negative  HENT: Negative  Neck pain with radiculopathy down his left arm   Eyes: Negative  Respiratory: Negative for chest tightness and shortness of breath  Cardiovascular: Negative for chest pain, palpitations and leg swelling  Gastrointestinal: Negative  Endocrine: Negative  Musculoskeletal: Positive for neck pain  Skin: Negative  Allergic/Immunologic: Negative  Neurological: Positive for light-headedness  Hematological: Negative  Psychiatric/Behavioral: Negative  Physical Exam:  /70 (BP Location: Right arm, Patient Position: Sitting, Cuff Size: Adult)   Pulse 62   Wt 62 7 kg (138 lb 3 2 oz)   BMI 21 65 kg/m²     PREVIOUS WEIGHTS:   Wt Readings from Last 10 Encounters:   08/09/21 62 7 kg (138 lb 3 2 oz)   07/30/21 64 kg (141 lb)   07/12/21 62 2 kg (137 lb 2 oz)   06/25/21 64 9 kg (143 lb)   06/04/21 68 8 kg (151 lb 9 6 oz)   04/12/21 66 kg (145 lb 8 oz)   01/28/21 64 5 kg (142 lb 3 2 oz)   11/06/20 64 kg (141 lb)   10/05/20 62 6 kg (138 lb)   08/24/20 60 3 kg (133 lb)      Physical Exam  Vitals reviewed  Constitutional:       General: He is not in acute distress  Appearance: He is well-developed  HENT:      Head: Normocephalic and atraumatic  Neck:      Thyroid: No thyromegaly  Vascular: No carotid bruit or JVD  Trachea: No tracheal deviation  Cardiovascular:      Rate and Rhythm: Normal rate and regular rhythm  Pulses: Normal pulses  Heart sounds: Normal heart sounds  No murmur heard  No friction rub  No gallop  Pulmonary:      Effort: Pulmonary effort is normal  No respiratory distress  Breath sounds:  No wheezing, rhonchi or rales  Comments:  Decreased breath sounds bilaterally consistent with COPD  Chest:      Chest wall: No tenderness  Abdominal:      General: Bowel sounds are normal  There is no distension  Palpations: Abdomen is soft  Tenderness: There is no abdominal tenderness  Musculoskeletal:      Cervical back: Normal range of motion and neck supple  Right lower leg: No edema  Left lower leg: No edema  Skin:     General: Skin is warm and dry  Neurological:      General: No focal deficit present  Mental Status: He is alert and oriented to person, place, and time  Gait: Gait normal    Psychiatric:         Mood and Affect: Mood normal          Behavior: Behavior normal          Thought Content: Thought content normal          Judgment: Judgment normal        ======================================================   I have personally reviewed pertinent reports  Portions of the record may have been created with voice recognition software  Occasional wrong word or "sound a like" substitutions may have occurred due to the inherent limitations of voice recognition software  Read the chart carefully and recognize, using context, where substitutions have occurred      SIGNATURES:   Jayden hZong MD

## 2021-08-09 NOTE — PROGRESS NOTES
[facility]  [address] [facility], [place] [address]  Phone#  [phone]   Fax#  [phone]  *-*-*-*-*-*-*-*-*-*-*-*-*-*-*-*-*-*-*-*-*-*-*-*-*-*-*-*-*-*-*-*-*-*-*-*-*-*-*-*-*-*-*-*-*-*-*-*-*-*-*-*-*-*                                              Cardiology Consultation       ENCOUNTER DATE: 21 10:02 AM  PATIENT NAME: Gregory Victoria   : 1945    MRN: [id number]  AGE:75 y o  SEX: male  [address] Dequan Grissom MD     PRIMARY CARE PHYSICIAN: Alexa Ortiz MD    ACTIVE DIAGNOSIS THIS VISIT  1  Coronary artery disease involving native coronary artery of native heart without angina pectoris  Ambulatory referral to Cardiology   2  History of non-ST elevation myocardial infarction (NSTEMI)     3  Pure hypercholesterolemia     4  Essential hypertension  Ambulatory referral to Cardiology    POCT ECG   5  Iron deficiency anemia, unspecified iron deficiency anemia type     6  Tobacco abuse     7  DIEUDONNE (obstructive sleep apnea)     8  Type 2 diabetes mellitus with stage 4 chronic kidney disease, without long-term current use of insulin ([place] [address] )     9  Chronic obstructive pulmonary disease with acute exacerbation [facility])  Ambulatory referral to Cardiology   10   Stage 4 chronic kidney disease ([place] [address] )  Ambulatory referral to Cardiology     ACTIVE PROBLEM LIST  Patient Active Problem List   Diagnosis    Atopic dermatitis    Anxiety    Bradycardia, sinus    DIEUDONNE (obstructive sleep apnea)    Cervical disc disease with myelopathy    Chronic pain disorder    CKD (chronic kidney disease)    Type 2 diabetes mellitus ([place] [address] )    Coronary artery disease involving native coronary artery of native heart without angina pectoris    DDD (degenerative disc disease), lumbar    Depression    DNR (do not resuscitate)    Eczema    Encephalopathy acute    Hypertension    Hyperlipidemia associated with type 2 diabetes mellitus ([facility] )    Increased frequency of urination    Iron deficiency anemia    Long term (current) use of systemic steroids    Prostate cancer (HCC)    Ptosis, bilateral    Chronic obstructive pulmonary disease (HCC)    Clear cell carcinoma of kidney (HCC)    Restless legs syndrome (RLS)    Retroperitoneal lymphadenopathy    Secondary adrenal insufficiency (HCC)    Tobacco abuse    Adenomatous duodenal polyp    Vitamin D deficiency    Hyperparathyroidism ([facility] )    Hematuria    Hypertensive retinopathy, bilateral    Age-related nuclear cataract, bilateral    Age-related osteoporosis without current pathological fracture    Mild episode of recurrent major depressive disorder ([facility] )    History of non-ST elevation myocardial infarction (NSTEMI)    Pure hypercholesterolemia       HPI:    Patient is a 59-year-old male who had a myocardial infarction involving his 1st obtuse marginal branch of the circumflex in April 2017 which was then stented  He had a 2nd myocardial infarction in November 2018 which involved the 2nd obtuse marginal is circumflex artery which was also stented  The condition of the other arteries of his heart are not known and I have requested cardiac catheterization reports and CDs  Patient denies cardiac symptoms  Patient denies chest discomfort or shortness of breath  Patient has no palpitations  Patient denies symptoms of dizziness, lightheadedness or near-syncope/syncope  Patient denies leg edema  Patient denies symptoms of orthopnea or paroxysmal nocturnal dyspnea  He  has had 2 neck operations with considerable metal in his neck and constant pain for which he takes narcotics  He has significant cervical disc disease and myelopathy  He has clear cell carcinoma of the kidney and prostate cancer  He has had long-term use of corticosteroids  He was hospitalized in June 2021 with COPD exacerbation  After discharge he was debilitated and had severe orthostatic hypotension  This has been gradually improving and he is now 90% better      Patient is a long-term cigarette smoker  He has tried to quit many times but is now discouraged concerning his kidney tumor and it is not best time to quit        DISCUSSION/PLAN:    Return in 3 months    Lab Studies:    Lab Results   Component Value Date    CHOLESTEROL 152 04/20/2021    CHOLESTEROL 162 10/01/2020    CHOLESTEROL 171 01/15/2020     Lab Results   Component Value Date    TRIG 141 10/01/2020    TRIG 140 01/15/2020     Lab Results   Component Value Date    HDL 48 04/20/2021    HDL 48 10/01/2020    HDL 42 01/15/2020     Lab Results   Component Value Date    LDLCALC 86 10/01/2020    LDLCALC 101 (H) 01/15/2020     No results found for: NONHDL  Lab Results   Component Value Date    LDLDIRECT 89 04/20/2021         Lab Results   Component Value Date    HGBA1C 5 6 06/28/2021      Lab Results   Component Value Date    EGFR 18 04/20/2021    EGFR 20 12/07/2020    EGFR 19 10/01/2020    SODIUM 143 04/20/2021    SODIUM 143 12/07/2020    SODIUM 144 10/01/2020    K 5 0 04/20/2021    K 5 3 12/07/2020    K 4 9 10/01/2020     (H) 04/20/2021     (H) 12/07/2020     (H) 10/01/2020    CO2 23 04/20/2021    CO2 24 12/07/2020    CO2 21 10/01/2020    BUN 48 (H) 04/20/2021    BUN 78 (H) 12/07/2020    BUN 69 (H) 10/01/2020    CREATININE 3 22 (H) 04/20/2021    CREATININE 2 92 (H) 12/07/2020    CREATININE 3 09 (H) 10/01/2020     Lab Results   Component Value Date    WBC 8 62 12/07/2020    WBC 7 73 01/15/2020    HGB 10 2 (L) 12/07/2020    HGB 11 7 (L) 01/15/2020    HCT 33 4 (L) 12/07/2020    HCT 38 2 01/15/2020    MCV 97 12/07/2020    MCV 93 01/15/2020    MCH 29 5 12/07/2020    MCH 28 5 01/15/2020    MCHC 30 5 (L) 12/07/2020    MCHC 30 6 (L) 01/15/2020     12/07/2020     01/15/2020        Lab Results   Component Value Date    CALCIUM 8 6 04/20/2021    CALCIUM 9 5 12/07/2020    CALCIUM 8 9 10/01/2020    AST 11 04/20/2021    AST 10 10/01/2020    AST 13 04/02/2020    ALT 15 04/20/2021    ALT 19 10/01/2020    ALT 17 04/02/2020    ALKPHOS 50 04/20/2021    ALKPHOS 62 10/01/2020    ALKPHOS 68 04/02/2020       Lab Results   Component Value Date    FERRITIN 28 12/07/2020    IRON 38 (L) 12/07/2020    TIBC 266 12/07/2020         Current Outpatient Medications:     albuterol (PROVENTIL HFA,VENTOLIN HFA) 90 mcg/act inhaler, TAKE 2 PUFFS BY MOUTH EVERY 4 HOURS AS NEEDED FOR WHEEZE, Disp: , Rfl:     aspirin (ASPIRIN 81) 81 mg EC tablet, Take 81 mg by mouth daily, Disp: , Rfl:     atorvastatin (LIPITOR) 80 mg tablet, Take 80 mg by mouth daily, Disp: , Rfl:     carvedilol (COREG) 25 mg tablet, Take 25 mg by mouth 2 (two) times a day with meals, Disp: , Rfl:     Cholecalciferol ( VITAMIN D3) 50 MCG (2000 UT) CAPS, Take 1 capsule (2,000 Units total) by mouth daily, Disp: 30 capsule, Rfl: 11    clopidogrel (PLAVIX) 75 mg tablet, Take 1 tablet (75 mg total) by mouth daily, Disp: 90 tablet, Rfl: 3    ferrous sulfate 325 (65 Fe) mg tablet, Take 325 mg by mouth, Disp: , Rfl:     hydrALAZINE (APRESOLINE) 25 mg tablet, Take 25 mg by mouth 2 (two) times a day, Disp: , Rfl:     isosorbide mononitrate (IMDUR) 30 mg 24 hr tablet, Take 30 mg by mouth daily, Disp: , Rfl:     mirtazapine (REMERON) 30 mg tablet, Take 1 tablet (30 mg total) by mouth daily at bedtime, Disp: 90 tablet, Rfl: 1    Multiple Vitamins-Minerals (Ocuvite-Lutein) CAPS, Take 1 capsule by mouth daily, Disp: , Rfl:     nitroglycerin (NITROSTAT) 0 4 mg SL tablet, Place 0 4 mg under the tongue, Disp: , Rfl:     oxyCODONE (ROXICODONE) 10 MG TABS, , Disp: , Rfl:     predniSONE 10 mg tablet, Take 1 tablet (10 mg total) by mouth daily, Disp: 30 tablet, Rfl: 5    Fluad Quadrivalent 0 5 ML PRSY, PHARMACY ADMINISTERED (Patient not taking: Reported on 7/12/2021), Disp: , Rfl:     fluticasone-salmeterol (Advair) 250-50 mcg/dose inhaler, Inhale 1 puff 2 (two) times a day Rinse mouth after use   (Patient not taking: Reported on 7/30/2021), Disp: , Rfl:     tacrolimus (PROTOPIC) 0 1 % ointment, Apply topically twice daily to affected areas (Patient not taking: Reported on 2020), Disp: 100 g, Rfl: 3    Current Facility-Administered Medications:     denosumab (PROLIA) subcutaneous injection 60 mg, 60 mg, Subcutaneous, Q6 Months, Shabnam Finely, CRNP, 60 mg at 21 8031  Allergies   Allergen Reactions    Ace Inhibitors Hives     Renal cell ca with prior nephrectomy, CKD with JESSICA recently   Baclofen      Sedation toxicity     Bactrim [Sulfamethoxazole-Trimethoprim] Dizziness and Fatigue     Severe fatigue    Bupropion      Violent     Fentanyl Myalgia     "i lost control of my left arm and left leg"    Mirapex [Pramipexole] Other (See Comments)     Dark or brown urine    Norvasc [Amlodipine] Nausea Only and Other (See Comments)     Bad taste in mouth    Pregabalin      Weight gain, become manic    Topiramate      Increased pain and body aches        Past Medical History:   Diagnosis Date    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[address] )     JESSICA (acute kidney injury) ([place] [address] ) 2017    Nephrology: Dr Mamta Alonzo   CAD (coronary artery disease)     Cancer (HCC)     Chronic kidney disease     Chronic pain     Depression     Diabetes mellitus ([facility] )     Hyperlipidemia     Hypertension     Macular degeneration     Personal history of kidney cancer     Prostate cancer ([facility] )     Spinal cord injury, cervical region ([place] [address] )     Type 2 diabetes mellitus ([place] [address] )      Social History     Socioeconomic History    Marital status:       Spouse name: Not on file    Number of children: Not on file    Years of education: Not on file    Highest education level: Not on file   Occupational History    Occupation:    Tobacco Use    Smoking status: Current Every Day Smoker     Packs/day: 1 00     Years: 35 00     Pack years: 35 00     Types: Cigars     Last attempt to quit: 3/14/2021     Years since quittin 4    Smokeless tobacco: Never Used    Tobacco comment: On and off smoker Vaping Use    Vaping Use: Never used   Substance and Sexual Activity    Alcohol use: Not Currently     Alcohol/week: 0 0 standard drinks    Drug use: Not Currently    Sexual activity: Not Currently     Partners: Female   Other Topics Concern    Not on file   Social History Narrative    Not on file     Social Determinants of Health     Financial Resource Strain:     Difficulty of Paying Living Expenses:    Food Insecurity:     Worried About Running Out of Food in the Last Year:     [address] in the Last Year:    Transportation Needs:     Lack of Transportation (Medical):  Lack of Transportation (Non-Medical):    Physical Activity:     Days of Exercise per Week:     Minutes of Exercise per Session:    Stress:     Feeling of Stress :    Social Connections:     Frequency of Communication with Friends and Family:     Frequency of Social Gatherings with Friends and Family:     Attends Mosque Services:     Active Member of Clubs or Organizations:     Attends Club or Organization Meetings:     Marital Status:    Intimate Partner Violence:     Fear of Current or Ex-Partner:     Emotionally Abused:     Physically Abused:     Sexually Abused:       Family History   Problem Relation Age of Onset    Diabetes Mother     Heart attack Mother     Heart failure Mother     Diabetes Father     Heart attack Father     Lupus Sister     Diabetes Brother     Heart disease Brother      Past Surgical History:   Procedure Laterality Date    CERVICAL SPINE SURGERY      COLONOSCOPY W/ BIOPSIES      LAMINECTOMY      PARTIAL NEPHRECTOMY Right 02/01/2017    SPINE SURGERY  2003       Review of Systems:  Review of Systems   Constitutional: Negative  HENT: Negative  Neck pain with radiculopathy down his left arm   Eyes: Negative  Respiratory: Negative for chest tightness and shortness of breath  Cardiovascular: Negative for chest pain, palpitations and leg swelling  Gastrointestinal: Negative  Endocrine: Negative  Musculoskeletal: Positive for neck pain  Skin: Negative  Allergic/Immunologic: Negative  Neurological: Positive for light-headedness  Hematological: Negative  Psychiatric/Behavioral: Negative  Physical Exam:  /70 (BP Location: Right arm, Patient Position: Sitting, Cuff Size: Adult)   Pulse 62   Wt 62 7 kg (138 lb 3 2 oz)   BMI 21 65 kg/m²     PREVIOUS WEIGHTS:   Wt Readings from Last 10 Encounters:   08/09/21 62 7 kg (138 lb 3 2 oz)   07/30/21 64 kg (141 lb)   07/12/21 62 2 kg (137 lb 2 oz)   06/25/21 64 9 kg (143 lb)   06/04/21 68 8 kg (151 lb 9 6 oz)   04/12/21 66 kg (145 lb 8 oz)   01/28/21 64 5 kg (142 lb 3 2 oz)   11/06/20 64 kg (141 lb)   10/05/20 62 6 kg (138 lb)   08/24/20 60 3 kg (133 lb)      Physical Exam  Vitals reviewed  Constitutional:       General: He is not in acute distress  Appearance: He is well-developed  HENT:      Head: Normocephalic and atraumatic  Neck:      Thyroid: No thyromegaly  Vascular: No carotid bruit or JVD  Trachea: No tracheal deviation  Cardiovascular:      Rate and Rhythm: Normal rate and regular rhythm  Pulses: Normal pulses  Heart sounds: Normal heart sounds  No murmur heard  No friction rub  No gallop  Pulmonary:      Effort: Pulmonary effort is normal  No respiratory distress  Breath sounds: No wheezing, rhonchi or rales  Comments:  Decreased breath sounds bilaterally consistent with COPD  Chest:      Chest wall: No tenderness  Abdominal:      General: Bowel sounds are normal  There is no distension  Palpations: Abdomen is soft  Tenderness: There is no abdominal tenderness  Musculoskeletal:      Cervical back: Normal range of motion and neck supple  Right lower leg: No edema  Left lower leg: No edema  Skin:     General: Skin is warm and dry  Neurological:      General: No focal deficit present        Mental Status: He is alert and oriented to person, place, and time  Gait: Gait normal    Psychiatric:         Mood and Affect: Mood normal          Behavior: Behavior normal          Thought Content: Thought content normal          Judgment: Judgment normal        ======================================================   I have personally reviewed pertinent reports  Portions of the record may have been created with voice recognition software  Occasional wrong word or "sound a like" substitutions may have occurred due to the inherent limitations of voice recognition software  Read the chart carefully and recognize, using context, where substitutions have occurred      SIGNATURES:   Jayden Zhong MD

## 2021-08-11 NOTE — TELEPHONE ENCOUNTER
Did receive hold back from Dr Hernando Telles, approved the plavix hold but not for aspirin    Will await hold request from new cardiologist

## 2021-08-17 PROBLEM — H35.3132 INTERMEDIATE STAGE NONEXUDATIVE AGE-RELATED MACULAR DEGENERATION OF BOTH EYES: Status: ACTIVE | Noted: 2020-10-13

## 2021-08-17 NOTE — TELEPHONE ENCOUNTER
----- Message from Fely Jerome sent at 8/17/2021  9:06 AM EDT -----  Regarding: diabetic eye exam / [facility] primary care  08/17/21 9:07 AM    Hello, our patient Chiara Mobley has had Diabetic Eye Exam completed/performed  Please assist in updating the patient chart by making an External outreach to Dr Jhony Rahman facility located in [facility]  The date of service is May 2021      Thank you,  Fely [facility] [address]

## 2021-08-17 NOTE — ASSESSMENT & PLAN NOTE
Lab Results   Component Value Date    HGBA1C 5 6 06/28/2021     Patient's A1c is excellent, however his blood sugars have been quite randomly up and down  I would strongly recommend that he limit carbohydrates, increase exercise as tolerable, and follow-up afterwards  Certainly, with his U5D he does not need medications, but he should concentrate on lifestyle modifications in limiting the carbohydrate intake a bit  Greater than 200 on 2 separate occasions for a blood sugar is consider diabetes, within the last month he has had at least 2 of those  He has also had low sugars in the 60s

## 2021-08-17 NOTE — TELEPHONE ENCOUNTER
Upon review of the In Basket request we were able to locate, review, and update the patient chart as requested for Diabetic Eye Exam     Any additional questions or concerns should be emailed to the Practice Liaisons via [email or web address] email, please do not reply via In Basket      Thank you  Radha Ugalde

## 2021-08-17 NOTE — ASSESSMENT & PLAN NOTE
Patient seeing [facility] for pain  He is setting up for injections  I would ask that if he needs pain medications, that he talk with pain management

## 2021-08-17 NOTE — PROGRESS NOTES
Assessment and Plan:    Problem List Items Addressed This Visit     Chronic obstructive pulmonary disease ([facility] )     Stable at the moment  No change  Chronic pain disorder     Patient seeing Dr National Winnie Rae at [facility] for pain  He is setting up for injections  I would ask that if he needs pain medications, that he talk with pain management  CKD (chronic kidney disease) - Primary     Lab Results   Component Value Date    EGFR 18 04/20/2021    EGFR 20 12/07/2020    EGFR 19 10/01/2020    CREATININE 3 22 (H) 04/20/2021    CREATININE 2 92 (H) 12/07/2020    CREATININE 3 09 (H) 10/01/2020   GFR and CKD stable  Follows with renal          Relevant Orders    Comprehensive metabolic panel    Coronary artery disease involving native coronary artery of native heart without angina pectoris     Stable  No concerns  Seeing Cardio at [facility]  Depression     Stable  Neg SI, pos contract  Using Remeron  Hyperlipidemia associated with type 2 diabetes mellitus ([facility] )    Relevant Orders    Comprehensive metabolic panel    Cholesterol, total    HDL cholesterol    LDL cholesterol, direct    Hypertension     Patient's blood pressure is not at goal, however it is definitely improved verses where it has been in the past   He has also seen Cardiology and Nephrology, and because of this I would not make any adjustments  Relevant Orders    Comprehensive metabolic panel    Type 2 diabetes mellitus ([facility] )       Lab Results   Component Value Date    HGBA1C 5 6 06/28/2021     Patient's A1c is excellent, however his blood sugars have been quite randomly up and down  I would strongly recommend that he limit carbohydrates, increase exercise as tolerable, and follow-up afterwards  Certainly, with his E9U he does not need medications, but he should concentrate on lifestyle modifications in limiting the carbohydrate intake a bit    Greater than 200 on 2 separate occasions for a blood sugar is consider diabetes, within the last month he has had at least 2 of those  He has also had low sugars in the 60s  Relevant Orders    Hemoglobin A1C    Microalbumin / creatinine urine ratio    Comprehensive metabolic panel                 Diagnoses and all orders for this visit:    Stage 4 chronic kidney disease ([facility] )  -     Comprehensive metabolic panel; Future    Type 2 diabetes mellitus with stage 4 chronic kidney disease, without long-term current use of insulin (HCC)  -     Hemoglobin A1C; Future  -     Microalbumin / creatinine urine ratio; Future  -     Comprehensive metabolic panel; Future    Essential hypertension  -     Comprehensive metabolic panel; Future    Mild episode of recurrent major depressive disorder (HCC)    Coronary artery disease involving native coronary artery of native heart without angina pectoris    Pulmonary emphysema, unspecified emphysema type (HCC)    Chronic pain disorder    Hyperlipidemia associated with type 2 diabetes mellitus ([facility] )  -     Comprehensive metabolic panel; Future  -     Cholesterol, total; Future  -     HDL cholesterol; Future  -     LDL cholesterol, direct; Future              Subjective:      Patient ID: Jodie Pleitez is a 76 y o  male  CC:    Chief Complaint   Patient presents with    Diabetes     Pt states he has not had blood work done  Pt refuses IRIS exam   -  [facility]    Chronic Kidney Disease    Anxiety       HPI:    Has ear symptoms  Hears popping when swallows  Little dizziness, 90-95% gone now  DM: A1C was done recently, was 5 6  Review diabetes and blood sugars  He has had several blood sugars over 200  CKD: He is seeing renal   He had [facility]  Was noted to have stones and cysts  Reviewed by Care Everywhere  Hypertension: He has BP reviewed today  Hypertension: He has BP reviewed today          The following portions of the patient's history were reviewed and updated as appropriate: allergies, current medications, past family history, past medical history, past social history, past surgical history and problem list       Review of Systems   Constitutional: Negative  HENT: Negative  Eyes: Negative  Respiratory: Negative  Cardiovascular: Negative  Gastrointestinal: Negative  Endocrine: Negative  Genitourinary: Negative  Musculoskeletal: Negative  Skin: Negative  Allergic/Immunologic: Negative  Neurological: Negative  Hematological: Negative  Psychiatric/Behavioral: Negative  Data to review:       Objective:    Vitals:    08/17/21 0901   BP: 142/74   BP Location: Left arm   Patient Position: Sitting   Cuff Size: Standard   Pulse: 70   Temp: 97 7 °F (36 5 °C)   TempSrc: Oral   Weight: 64 kg (141 lb)   Height: 5' 7" (1 702 m)        Physical Exam  Vitals and nursing note reviewed  Constitutional:       Appearance: Normal appearance  Neck:      Vascular: No carotid bruit  Cardiovascular:      Rate and Rhythm: Normal rate and regular rhythm  Pulses: Normal pulses  Carotid pulses are 2+ on the right side and 2+ on the left side  Heart sounds: Normal heart sounds  No murmur heard  No gallop  Pulmonary:      Effort: Pulmonary effort is normal  No respiratory distress  Breath sounds: Normal breath sounds  No stridor  No wheezing, rhonchi or rales  Chest:      Chest wall: No tenderness  Neurological:      Mental Status: He is alert

## 2021-08-17 NOTE — PATIENT INSTRUCTIONS
Problem List Items Addressed This Visit     Chronic obstructive pulmonary disease ([facility] )     Stable at the moment  No change  Chronic pain disorder     Patient seeing Dr PUCKETT [facility] at [facility] for pain  He is setting up for injections  I would ask that if he needs pain medications, that he talk with pain management  CKD (chronic kidney disease) - Primary     Lab Results   Component Value Date    EGFR 18 04/20/2021    EGFR 20 12/07/2020    EGFR 19 10/01/2020    CREATININE 3 22 (H) 04/20/2021    CREATININE 2 92 (H) 12/07/2020    CREATININE 3 09 (H) 10/01/2020   GFR and CKD stable  Follows with renal          Relevant Orders    Comprehensive metabolic panel    Coronary artery disease involving native coronary artery of native heart without angina pectoris     Stable  No concerns  Seeing Cardio at [facility]  Depression     Stable  Neg SI, pos contract  Using Remeron  Hyperlipidemia associated with type 2 diabetes mellitus ([facility] )    Relevant Orders    Comprehensive metabolic panel    Cholesterol, total    HDL cholesterol    LDL cholesterol, direct    Hypertension     Patient's blood pressure is not at goal, however it is definitely improved verses where it has been in the past   He has also seen Cardiology and Nephrology, and because of this I would not make any adjustments  Relevant Orders    Comprehensive metabolic panel    Type 2 diabetes mellitus ([place] [address] )       Lab Results   Component Value Date    HGBA1C 5 6 06/28/2021     Patient's A1c is excellent, however his blood sugars have been quite randomly up and down  I would strongly recommend that he limit carbohydrates, increase exercise as tolerable, and follow-up afterwards  Certainly, with his D7Y he does not need medications, but he should concentrate on lifestyle modifications in limiting the carbohydrate intake a bit    Greater than 200 on 2 separate occasions for a blood sugar is consider diabetes, within the last month he has had at least 2 of those  He has also had low sugars in the 60s  Relevant Orders    Hemoglobin A1C    Microalbumin / creatinine urine ratio    Comprehensive metabolic panel          COVID 19 Instructions    Daily Cifuentes was advised to limit contact with others to essential tasks such as getting food, medications, and medical care  Proper handwashing reviewed, and Hand sanitzer when washing is not available  If the patient develops symptoms of COVID 19, the patient should call the office as soon as possible  For [id number] Flu season, it is strongly recommended that Flu Vaccinations be obtained  Virtual Visits may be conducted in several ways: Dagoberto: You should get a text message when the provider is ready to see you  Click on the link in the text message, and the call should start  You will need to type in your name, and allow camera and microphone access  This is HIPPA compliant, and secure  Please try to download Google Duo  Once you do download this on your phone, you will be prompted to add your phone number to the account  After that, he should receive a text from [employer], and use that code to verify your phone number  After that, you should be able to use Google Duo to receive and make video calls  Please download Microsoft Teams to your phone or computer  You would get an email with the meeting after scheduling with the office  You will Join the meeting, and wait there till the provider joins as well  Instructions for downloading this are available from the office  This is HIPPA compliant, and secure  We are committed to getting you vaccinated as soon as possible and will be closely following CDC and [facility]  guidelines as they are released and revised  Please refer to our COVID-19 vaccine webpage for the most up to date information on the vaccine and our distribution efforts      [employer] tn    OUR NEW LOCATION:  Starting around 28June2021, our new location and phone are:    [address], [address] [place] [address], [place], [address]  Fax: [phone]    Lab services and OB/GYN will be at this location as well

## 2021-08-17 NOTE — ASSESSMENT & PLAN NOTE
Patient's blood pressure is not at goal, however it is definitely improved verses where it has been in the past   He has also seen Cardiology and Nephrology, and because of this I would not make any adjustments

## 2021-08-17 NOTE — ASSESSMENT & PLAN NOTE
Lab Results   Component Value Date    EGFR 18 04/20/2021    EGFR 20 12/07/2020    EGFR 19 10/01/2020    CREATININE 3 22 (H) 04/20/2021    CREATININE 2 92 (H) 12/07/2020    CREATININE 3 09 (H) 10/01/2020   GFR and CKD stable   Follows with renal

## 2021-08-27 NOTE — TELEPHONE ENCOUNTER
Dr Issa Bruce,    We received your hold approval for Mr Isaías Wray to hold his Aspirin for 6 days  Per pt it my understanding that he is now under your care  In order to proceed with a Cervical Epidural Steroid Injection we need permission to hold both Plavix and Aspirin  Do you give permission for pt to hold his Plavix for 7 days in addition to holding his Aspirin for 6 days?      Please advise-

## 2021-08-28 NOTE — TELEPHONE ENCOUNTER
I have seen this patient once in consultation in the office  If he is compliant in keeps his return appointment, I will consider in my patient  I would prefer to hold the Plavix and aspirin the same amount time or 6 days  I feel a hold of 7 days for the Plavix is unnecessarily long  You do a my permission to hold both the aspirin and Plavix for 6 days    Hopefully you will find this acceptable    Deyvi Mayorga MD

## 2021-08-30 NOTE — TELEPHONE ENCOUNTER
Patient cancelled appointment via [address]  I called the patient and left him a message to see if he cancelled in error  I left a message asking him to call us back if he does want to reschedule

## 2021-08-31 NOTE — TELEPHONE ENCOUNTER
ANNA MARIE-    RN s/w pt  Pt scheduled for 9/16 2 pm with 1:50 arrival time for a HEATHER with Dr Mathieu Resendez  Pre-procedural instructions provided as follows-  -Pt advised his last dose of Aspirin and Plavix will need to be 9/9 ( Dr Cata Howell approved 6 day hold for both  KW aware ) and he will be advised when to resume after his procedure    -Will need a   -Wear loose, comfortable clothing  -NPO 1 hour prior  -No vaccines 2 weeks before or 2 weeks after  -If ends up on ABX or feeling sick in any way will need to reschedule  -Will need to tolerate a mask for the entire procedure  -Will need to practice strict soc distancing, masking and handwashing     Pt is Diabetic and was advised to monitor his BS closely for a week after his procedure and if he has elevations he should contact the person that manages his Diabetes

## 2021-09-16 NOTE — DISCHARGE INSTRUCTIONS
Epidural Steroid Injection   WHAT YOU NEED TO KNOW:   An epidural steroid injection (REFUGIO) is a procedure to inject steroid medicine into the epidural space  The epidural space is between your spinal cord and vertebrae  Steroids reduce inflammation and fluid buildup in your spine that may be causing pain  You may be given pain medicine along with the steroids  ACTIVITY  · Do not drive or operate machinery today  · No strenuous activity today - bending, lifting, etc   · You may resume normal activites starting tomorrow - start slowly and as tolerated  · You may shower today, but no tub baths or hot tubs  · You may have numbness for several hours from the local anesthetic  Please use caution and common sense, especially with weight-bearing activities  CARE OF THE INJECTION SITE  · If you have soreness or pain, apply ice to the area today (20 minutes on/20 minutes off)  · Starting tomorrow, you may use warm, moist heat or ice if needed  · You may have an increase or change in your discomfort for 36-48 hours after your treatment  · Apply ice and continue with any pain medication you have been prescribed  · Notify [facility] if you have any of the following: redness, drainage, swelling, headache, stiff neck or fever above 100°F     SPECIAL INSTRUCTIONS  · Our office will contact you in approximately 7 days for a progress report  MEDICATIONS  · Continue to take all routine medications  · Our office may have instructed you to hold some medications  You may resume your plavix and aspirin tomorrow after 2:30 pm     As no general anesthesia was used in today's procedure, you should not experience any side effects related to anesthesia  If you have a problem specifically related to your procedure, please call our office at [phone]  Problems not related to your procedure should be directed to your primary care physician

## 2021-09-16 NOTE — H&P
History of Present Illness: The patient is a 76 y o  male who presents with complaints of neck pain    Patient Active Problem List   Diagnosis    Atopic dermatitis    Anxiety    Bradycardia, sinus    DIEUDONNE (obstructive sleep apnea)    Cervical disc disease with myelopathy    Chronic pain disorder    CKD (chronic kidney disease)    Type 2 diabetes mellitus ([facility] )    Coronary artery disease involving native coronary artery of native heart without angina pectoris    DDD (degenerative disc disease), lumbar    Depression    DNR (do not resuscitate)    Eczema    Encephalopathy acute    Hypertension    Hyperlipidemia associated with type 2 diabetes mellitus (HCC)    Increased frequency of urination    Iron deficiency anemia    Long term (current) use of systemic steroids    [facility]STEMI [facility]non-ST elevated myocardial infarction) [facility]    Prostate cancer ([facility])    Ptosis, bilateral    Chronic obstructive pulmonary disease ([facility])    Clear cell carcinoma of kidney ([facility])    Restless legs syndrome (RLS)    Retroperitoneal lymphadenopathy    Secondary adrenal insufficiency ([facility])    Tobacco abuse    Adenomatous duodenal polyp    Vitamin D deficiency    Hyperparathyroidism ([facility] )    Hematuria    Hypertensive retinopathy, bilateral    Age-related nuclear cataract, bilateral    Age-related osteoporosis without current pathological fracture    Mild episode of recurrent major depressive disorder ([facility])    History of non-ST elevation myocardial infarction (NSTEMI)    Pure hypercholesterolemia    Intermediate stage nonexudative age-related macular degeneration of both eyes       Past Medical History:   Diagnosis Date    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[facility] )     JESSICA (acute kidney injury) ([place] [address] ) 9/7/2017    Nephrology: Dr Lesley Gamble      CAD (coronary artery disease)     Cancer ([facility])     Chronic kidney disease     Chronic pain     Depression     Diabetes [facility] )     Hyperlipidemia     Hypertension     Macular degeneration     Personal history of kidney cancer     Prostate cancer ([facility] )     Spinal cord injury, cervical region ([facility] )     Type 2 diabetes mellitus ([facility] )        Past Surgical History:   Procedure Laterality Date    CERVICAL SPINE SURGERY      COLONOSCOPY W/ BIOPSIES      LAMINECTOMY      PARTIAL NEPHRECTOMY Right 02/01/2017    SPINE SURGERY  2003         Current Outpatient Medications:     albuterol (PROVENTIL HFA,VENTOLIN HFA) 90 mcg/act inhaler, TAKE 2 PUFFS BY MOUTH EVERY 4 HOURS AS NEEDED FOR WHEEZE, Disp: , Rfl:     aspirin (ASPIRIN 81) 81 mg EC tablet, Take 81 mg by mouth daily, Disp: , Rfl:     atorvastatin (LIPITOR) 80 mg tablet, Take 80 mg by mouth daily, Disp: , Rfl:     carvedilol (COREG) 25 mg tablet, Take 25 mg by mouth 2 (two) times a day with meals, Disp: , Rfl:     Cholecalciferol ( VITAMIN D3) 50 MCG (2000 UT) CAPS, Take 1 capsule (2,000 Units total) by mouth daily, Disp: 30 capsule, Rfl: 11    clopidogrel (PLAVIX) 75 mg tablet, Take 1 tablet (75 mg total) by mouth daily, Disp: 90 tablet, Rfl: 3    ferrous sulfate 325 (65 Fe) mg tablet, Take 325 mg by mouth, Disp: , Rfl:     fluticasone-salmeterol (Advair) 250-50 mcg/dose inhaler, Inhale 1 puff 2 (two) times a day Rinse mouth after use , Disp: , Rfl:     hydrALAZINE (APRESOLINE) 25 mg tablet, Take 25 mg by mouth 2 (two) times a day, Disp: , Rfl:     isosorbide mononitrate (IMDUR) 30 mg 24 hr tablet, Take 30 mg by mouth daily, Disp: , Rfl:     mirtazapine (REMERON) 30 mg tablet, Take 1 tablet (30 mg total) by mouth daily at bedtime, Disp: 90 tablet, Rfl: 1    Multiple Vitamins-Minerals (Ocuvite-Lutein) CAPS, Take 1 capsule by mouth daily, Disp: , Rfl:     nitroglycerin (NITROSTAT) 0 4 mg SL tablet, Place 0 4 mg under the tongue, Disp: , Rfl:     oxyCODONE (ROXICODONE) 10 MG TABS, , Disp: , Rfl:     predniSONE 10 mg tablet, Take 1 tablet (10 mg total) by mouth daily, Disp: 30 tablet, Rfl: 5    Current Facility-Administered Medications:     denosumab (PROLIA) subcutaneous injection 60 mg, 60 mg, Subcutaneous, Q6 Months, YOSEF Gandhi, 60 mg at 06/16/21 9830    iohexol (OMNIPAQUE) 300 mg/mL injection 50 mL, 50 mL, Epidural, Once, Trent Archer, DO    methylPREDNISolone acetate (DEPO-MEDROL) injection 80 mg, 80 mg, Epidural, Once, Trent Demetrius, DO    Allergies   Allergen Reactions    Ace Inhibitors Hives     Renal cell ca with prior nephrectomy, CKD with JESSICA recently   Baclofen      Sedation toxicity     Bactrim [Sulfamethoxazole-Trimethoprim] Dizziness and Fatigue     Severe fatigue    Bupropion      Violent     Cefuroxime Dizziness    Fentanyl Myalgia     "i lost control of my left arm and left leg"    Mirapex [Pramipexole] Other (See Comments)     Dark or brown urine    Norvasc [Amlodipine] Nausea Only and Other (See Comments)     Bad taste in mouth    Pregabalin      Weight gain, become manic    Topiramate      Increased pain and body aches        Physical Exam:   Vitals:    09/16/21 1353   BP: 166/72   Pulse: 71   Resp: 18   Temp: 97 6 °F (36 4 °C)   SpO2: 98%     General: Awake, Alert, Oriented x 3, Mood and affect appropriate  Respiratory: Respirations even and unlabored  Cardiovascular: Peripheral pulses intact; no edema  Musculoskeletal Exam:  Tenderness to palpation bilateral cervical paraspinals    ASA Score: 2  Patient has been made explicitly aware that the injection will consist of steroid which may cause a temporary suppression in immune system activity  This, in turn, may increase the patient's risk of amita corona virus  He was advised to continue with social distancing and self quarantine  Patient wishes to proceed with the injection    Patient/Chart Verification  Patient ID Verified: Verbal  ID Band Applied: No  Consents Confirmed: Procedural, To be obtained in the Pre-Procedure area  H&P( within 30 days) Verified:  To be obtained in the Pre-Procedure area  Interval H&P(within 24 hr) Complete (required for Outpatients and Surgery Admit only): To be obtained in the Pre-Procedure area  Allergies Reviewed: Yes  Anticoag/NSAID held?: Yes (Pt held his Aspirin and his Plavix for 6 days  LD was 9/9 for both )  Currently on antibiotics?: No    Assessment:   1  Cervical disc disorder with radiculopathy of mid-cervical region    2  Chronic pain syndrome    3  Neural foraminal stenosis of cervical spine    4   Neck pain        Plan: HEATHER

## 2021-09-30 NOTE — PROGRESS NOTES
Assessment  1  Chronic pain syndrome    2  Cervical disc disorder with radiculopathy of mid-cervical region        Plan  The patient was seen in the office today for re-evaluation after his cervical epidural steroid injection on 09/16/2021  The patient reports that it relieved 70% of his pain and that today his pain is a 1-2/10  He states that he does still have radicular symptoms that are bothersome and he wishes that the injection could have helped with this a little bit more  At this time we will schedule a repeat C7-T1 cervical epidural steroid injection for the patient in hopes of further reducing his radicular symptoms  Complete risks and benefits including bleeding, infection, tissue reaction, nerve injury and allergic reaction were discussed  The approach was demonstrated using models and literature was provided  Verbal and written consent was obtained  My impressions and treatment recommendations were discussed in detail with the patient who verbalized understanding and had no further questions  Discharge instructions were provided  I personally saw and examined the patient and I agree with the above discussed plan of care  Orders Placed This Encounter   Procedures    FL spine and pain procedure     Dr Vasquez Mater     Standing Status:   Future     Standing Expiration Date:   9/30/2025     Order Specific Question:   Reason for Exam:     Answer:   C7-T1 HEATHER under fluoroscopy     Order Specific Question:   Anticoagulant hold needed? Answer:   Plavix     No orders of the defined types were placed in this encounter  History of Present Illness    Dorian Holloway is a 76 y o  male who presents to the office with a pain score of 1-2/10 today  He states that on Mondays pain was a 10/10 after waking up with right-sided pain that he states has never been on that side before    It got so bad that he took 20 mg of oxycodone which he states did nothing and that he suffered through all day with the pain   He states that at night he took 30 mg and the pain went away and he was able to sleep  He states that when he lays down his arm goes numb on the left side  He states he cannot live like this and needs to find a pain doctor willing to continue to prescribe his oxycodone  He states he cannot take Lyrica because he has taken it in the past and it made him manic and he gained 40 lb  He states he has been on high doses of gabapentin in the past that have done absolutely nothing for him  I have personally reviewed and/or updated the patient's past medical history, past surgical history, family history, social history, current medications, allergies, and vital signs today  Review of Systems   Respiratory: Negative for shortness of breath  Cardiovascular: Negative for chest pain  Gastrointestinal: Negative for constipation, diarrhea, nausea and vomiting  Musculoskeletal: Positive for gait problem and neck pain  Negative for arthralgias, joint swelling and myalgias  Skin: Negative for rash  Neurological: Negative for dizziness, seizures and weakness  All other systems reviewed and are negative        Patient Active Problem List   Diagnosis    Atopic dermatitis    Anxiety    Bradycardia, sinus    DIEUDONNE (obstructive sleep apnea)    Cervical disc disease with myelopathy    Chronic pain syndrome    CKD (chronic kidney disease)    [facility]    Coronary artery disease involving native coronary artery of native heart without angina pectoris    DDD (degenerative disc disease), lumbar    Depression    DNR (do not resuscitate)    Eczema    Encephalopathy acute    Hypertension    Hyperlipidemia associated with type 2 diabetes mellitus ([facility]    Increased frequency of urination    Iron deficiency anemia    Long term (current) use of systemic steroids    NSTEMI (non-ST elevated myocardial infarction) (HCC)    Prostate cancer (HCC)    Ptosis, bilateral    Chronic obstructive pulmonary disease (HCC)    Clear cell carcinoma of kidney (HCC)    Restless legs syndrome (RLS)    Retroperitoneal lymphadenopathy    Secondary adrenal insufficiency (HCC)    Tobacco abuse    Adenomatous duodenal polyp    Vitamin D deficiency    [facility]    Hematuria    Hypertensive retinopathy, bilateral    Age-related nuclear cataract, bilateral    Age-related osteoporosis without current pathological fracture    Mild episode of recurrent major depressive disorder ([facility] )    History of non-ST elevation myocardial infarction (NSTEMI)    Pure hypercholesterolemia    Intermediate stage nonexudative age-related macular degeneration of both eyes    Cervical disc disorder with radiculopathy of mid-cervical region    Neural foraminal stenosis of cervical spine    Neck pain       Past Medical History:   Diagnosis Date    [facility]     JESSICA [facility]) [facility] ) 9/7/2017    Nephrology: Dr Michel Church      CAD (coronary artery disease)     Cancer (HCC)     Chronic kidney disease     Chronic pain     Depression     Diabetes mellitus ([facility]     Hyperlipidemia     Hypertension     Macular degeneration     Personal history of kidney cancer     [facility] cancer [facility]     Spinal cord injury, cervical region ([place] [address] )     Type 2 diabetes mellitus ([facility] )        Past Surgical History:   Procedure Laterality Date    CERVICAL SPINE SURGERY      COLONOSCOPY W/ BIOPSIES      LAMINECTOMY      PARTIAL NEPHRECTOMY Right 02/01/2017    SPINE SURGERY  2003       Family History   Problem Relation Age of Onset    Diabetes Mother     Heart attack Mother     Heart failure Mother     Diabetes Father     Heart attack Father     Lupus Sister     Diabetes Brother     Heart disease Brother        Social History     Occupational History    Occupation:    Tobacco Use    Smoking status: Current Every Day Smoker     Packs/day: 1 00     Years: 35 00     Pack years: 35 00     Types: Cigars     Last attempt to quit: 3/14/2021     Years since quittin 5    Smokeless tobacco: Never Used    Tobacco comment: On and off smoker   Vaping Use    Vaping Use: Never used   Substance and Sexual Activity    Alcohol use: Not Currently     Alcohol/week: 0 0 standard drinks    Drug use: Not Currently    Sexual activity: Not Currently     Partners: Female       Current Outpatient Medications on File Prior to Visit   Medication Sig    albuterol (PROVENTIL HFA,VENTOLIN HFA) 90 mcg/act inhaler TAKE 2 PUFFS BY MOUTH EVERY 4 HOURS AS NEEDED FOR WHEEZE    aspirin (ASPIRIN 81) 81 mg EC tablet Take 81 mg by mouth daily    atorvastatin (LIPITOR) 80 mg tablet Take 80 mg by mouth daily    carvedilol (COREG) 25 mg tablet Take 1 tablet (25 mg total) by mouth 2 (two) times a day with meals    clopidogrel (PLAVIX) 75 mg tablet Take 1 tablet (75 mg total) by mouth daily    ferrous sulfate 325 (65 Fe) mg tablet Take 325 mg by mouth    fluticasone-salmeterol (Advair) 250-50 mcg/dose inhaler Inhale 1 puff 2 (two) times a day Rinse mouth after use      hydrALAZINE (APRESOLINE) 25 mg tablet Take 25 mg by mouth 2 (two) times a day    isosorbide mononitrate (IMDUR) 30 mg 24 hr tablet Take 30 mg by mouth daily    mirtazapine (REMERON) 30 mg tablet Take 1 tablet (30 mg total) by mouth daily at bedtime    Multiple Vitamins-Minerals (Ocuvite-Lutein) CAPS Take 1 capsule by mouth daily    nitroglycerin (NITROSTAT) 0 4 mg SL tablet Place 0 4 mg under the tongue    oxyCODONE (ROXICODONE) 10 MG TABS     predniSONE 10 mg tablet Take 1 tablet (10 mg total) by mouth daily    Cholecalciferol (SM VITAMIN D3) 50 MCG (2000 UT) CAPS Take 1 capsule (2,000 Units total) by mouth daily     Current Facility-Administered Medications on File Prior to Visit   Medication    denosumab (PROLIA) subcutaneous injection 60 mg       Allergies   Allergen Reactions    Ace Inhibitors Hives     Renal cell ca with prior nephrectomy, CKD with JESSICA recently   Baclofen      Sedation toxicity     Bactrim [Sulfamethoxazole-Trimethoprim] Dizziness and Fatigue     Severe fatigue    Bupropion      Violent     Cefuroxime Dizziness    Fentanyl Myalgia     "i lost control of my left arm and left leg"    Mirapex [Pramipexole] Other (See Comments)     Dark or brown urine    Norvasc [Amlodipine] Nausea Only and Other (See Comments)     Bad taste in mouth    Pregabalin      Weight gain, become manic    Topiramate      Increased pain and body aches        Physical Exam    BP (!) 177/74   Pulse 76   Ht 5' 7" (1 702 m)   Wt 65 8 kg (145 lb)   BMI 22 71 kg/m²     Constitutional: normal, well developed, well nourished, alert, in no distress and non-toxic and no overt pain behavior    Eyes: anicteric  HEENT: grossly intact  Neck: supple, symmetric, trachea midline and no masses   Pulmonary:even and unlabored  Cardiovascular:No edema or pitting edema present  Skin:Normal without rashes or lesions and well hydrated  Psychiatric:Mood and affect appropriate  Neurologic:Cranial Nerves II-XII grossly intact  Musculoskeletal:Limited range of motion due to pain and stiffness in neck    Imaging

## 2021-11-18 PROBLEM — N18.4 CHRONIC KIDNEY DISEASE, STAGE 4 (SEVERE) (HCC): Status: ACTIVE | Noted: 2018-02-21

## 2021-11-18 PROBLEM — D63.8 ANEMIA OF CHRONIC DISEASE: Status: ACTIVE | Noted: 2021-01-01

## 2021-11-26 NOTE — TELEPHONE ENCOUNTER
Pt reports no improvement post inj  Pt said he only has pain at night   Pt aware I will call next week for an update

## 2021-12-02 NOTE — TELEPHONE ENCOUNTER
Pt returned call in with 50  % improvement and pain level is a 4/10 while sitting idle laying down   Please be advised thank you    Pt can be reached @ [phone]

## 2022-01-01 ENCOUNTER — TELEPHONE (OUTPATIENT)
Dept: FAMILY MEDICINE CLINIC | Facility: CLINIC | Age: 77
End: 2022-01-01

## 2022-01-01 ENCOUNTER — OFFICE VISIT (OUTPATIENT)
Dept: CARDIOLOGY CLINIC | Facility: CLINIC | Age: 77
End: 2022-01-01
Payer: COMMERCIAL

## 2022-01-01 ENCOUNTER — OFFICE VISIT (OUTPATIENT)
Dept: FAMILY MEDICINE CLINIC | Facility: CLINIC | Age: 77
End: 2022-01-01
Payer: COMMERCIAL

## 2022-01-01 ENCOUNTER — PATIENT MESSAGE (OUTPATIENT)
Dept: CARDIOLOGY CLINIC | Facility: CLINIC | Age: 77
End: 2022-01-01

## 2022-01-01 ENCOUNTER — APPOINTMENT (OUTPATIENT)
Dept: LAB | Facility: CLINIC | Age: 77
End: 2022-01-01
Payer: COMMERCIAL

## 2022-01-01 ENCOUNTER — TELEPHONE (OUTPATIENT)
Dept: CARDIOLOGY CLINIC | Facility: CLINIC | Age: 77
End: 2022-01-01

## 2022-01-01 VITALS
SYSTOLIC BLOOD PRESSURE: 130 MMHG | RESPIRATION RATE: 16 BRPM | WEIGHT: 140 LBS | BODY MASS INDEX: 21.97 KG/M2 | HEART RATE: 72 BPM | DIASTOLIC BLOOD PRESSURE: 72 MMHG | HEIGHT: 67 IN

## 2022-01-01 VITALS
WEIGHT: 136.8 LBS | SYSTOLIC BLOOD PRESSURE: 130 MMHG | TEMPERATURE: 98.1 F | DIASTOLIC BLOOD PRESSURE: 70 MMHG | HEIGHT: 67 IN | BODY MASS INDEX: 21.47 KG/M2

## 2022-01-01 VITALS
WEIGHT: 143.2 LBS | SYSTOLIC BLOOD PRESSURE: 150 MMHG | DIASTOLIC BLOOD PRESSURE: 74 MMHG | BODY MASS INDEX: 22.43 KG/M2 | HEART RATE: 61 BPM

## 2022-01-01 DIAGNOSIS — D63.8 ANEMIA OF CHRONIC DISEASE: ICD-10-CM

## 2022-01-01 DIAGNOSIS — I25.10 CORONARY ARTERY DISEASE INVOLVING NATIVE CORONARY ARTERY OF NATIVE HEART WITHOUT ANGINA PECTORIS: Primary | ICD-10-CM

## 2022-01-01 DIAGNOSIS — E11.22 TYPE 2 DIABETES MELLITUS WITH STAGE 4 CHRONIC KIDNEY DISEASE, WITHOUT LONG-TERM CURRENT USE OF INSULIN (HCC): ICD-10-CM

## 2022-01-01 DIAGNOSIS — E11.69 HYPERLIPIDEMIA ASSOCIATED WITH TYPE 2 DIABETES MELLITUS (HCC): Primary | ICD-10-CM

## 2022-01-01 DIAGNOSIS — R29.898 COGWHEEL RIGIDITY: ICD-10-CM

## 2022-01-01 DIAGNOSIS — M50.120 CERVICAL DISC DISORDER WITH RADICULOPATHY OF MID-CERVICAL REGION: ICD-10-CM

## 2022-01-01 DIAGNOSIS — N18.4 TYPE 2 DIABETES MELLITUS WITH STAGE 4 CHRONIC KIDNEY DISEASE, WITHOUT LONG-TERM CURRENT USE OF INSULIN (HCC): ICD-10-CM

## 2022-01-01 DIAGNOSIS — I25.10 CORONARY ARTERY DISEASE INVOLVING NATIVE CORONARY ARTERY OF NATIVE HEART WITHOUT ANGINA PECTORIS: ICD-10-CM

## 2022-01-01 DIAGNOSIS — I10 PRIMARY HYPERTENSION: ICD-10-CM

## 2022-01-01 DIAGNOSIS — N18.4 CHRONIC KIDNEY DISEASE, STAGE 4 (SEVERE) (HCC): ICD-10-CM

## 2022-01-01 DIAGNOSIS — Z95.5 HISTORY OF HEART ARTERY STENT: ICD-10-CM

## 2022-01-01 DIAGNOSIS — I15.8 OTHER SECONDARY HYPERTENSION: ICD-10-CM

## 2022-01-01 DIAGNOSIS — E11.69 HYPERLIPIDEMIA ASSOCIATED WITH TYPE 2 DIABETES MELLITUS (HCC): ICD-10-CM

## 2022-01-01 DIAGNOSIS — B34.9 VIRAL ILLNESS: ICD-10-CM

## 2022-01-01 DIAGNOSIS — N18.6 ESRD (END STAGE RENAL DISEASE) (HCC): ICD-10-CM

## 2022-01-01 DIAGNOSIS — I10 ESSENTIAL HYPERTENSION: ICD-10-CM

## 2022-01-01 DIAGNOSIS — E78.5 HYPERLIPIDEMIA ASSOCIATED WITH TYPE 2 DIABETES MELLITUS (HCC): ICD-10-CM

## 2022-01-01 DIAGNOSIS — Z72.0 TOBACCO ABUSE: ICD-10-CM

## 2022-01-01 DIAGNOSIS — G25.81 RESTLESS LEGS SYNDROME (RLS): Primary | ICD-10-CM

## 2022-01-01 DIAGNOSIS — R80.0 ISOLATED NON-NEPHROTIC PROTEINURIA: ICD-10-CM

## 2022-01-01 DIAGNOSIS — C64.1 CLEAR CELL CARCINOMA OF RIGHT KIDNEY (HCC): ICD-10-CM

## 2022-01-01 DIAGNOSIS — E27.49 SECONDARY ADRENAL INSUFFICIENCY (HCC): ICD-10-CM

## 2022-01-01 DIAGNOSIS — N18.4 CHRONIC KIDNEY DISEASE, STAGE 4 (SEVERE) (HCC): Primary | ICD-10-CM

## 2022-01-01 DIAGNOSIS — R42 DIZZINESS: ICD-10-CM

## 2022-01-01 DIAGNOSIS — E78.00 PURE HYPERCHOLESTEROLEMIA: ICD-10-CM

## 2022-01-01 DIAGNOSIS — Z86.73 OLD CEREBROVASCULAR ACCIDENT (CVA) WITHOUT LATE EFFECT: ICD-10-CM

## 2022-01-01 DIAGNOSIS — J43.9 PULMONARY EMPHYSEMA, UNSPECIFIED EMPHYSEMA TYPE (HCC): ICD-10-CM

## 2022-01-01 DIAGNOSIS — I25.2 HISTORY OF NON-ST ELEVATION MYOCARDIAL INFARCTION (NSTEMI): ICD-10-CM

## 2022-01-01 DIAGNOSIS — G89.4 CHRONIC PAIN SYNDROME: ICD-10-CM

## 2022-01-01 DIAGNOSIS — C61 PROSTATE CANCER (HCC): ICD-10-CM

## 2022-01-01 DIAGNOSIS — F33.0 MILD EPISODE OF RECURRENT MAJOR DEPRESSIVE DISORDER (HCC): ICD-10-CM

## 2022-01-01 DIAGNOSIS — G47.33 OSA (OBSTRUCTIVE SLEEP APNEA): ICD-10-CM

## 2022-01-01 DIAGNOSIS — N18.4 CHRONIC KIDNEY DISEASE, STAGE IV (SEVERE) (HCC): ICD-10-CM

## 2022-01-01 DIAGNOSIS — N18.4 STAGE 4 CHRONIC KIDNEY DISEASE (HCC): ICD-10-CM

## 2022-01-01 DIAGNOSIS — E78.5 HYPERLIPIDEMIA ASSOCIATED WITH TYPE 2 DIABETES MELLITUS (HCC): Primary | ICD-10-CM

## 2022-01-01 DIAGNOSIS — F11.20 CONTINUOUS OPIOID DEPENDENCE (HCC): ICD-10-CM

## 2022-01-01 DIAGNOSIS — I15.8 OTHER SECONDARY HYPERTENSION: Primary | ICD-10-CM

## 2022-01-01 DIAGNOSIS — D51.8 OTHER VITAMIN B12 DEFICIENCY ANEMIA: ICD-10-CM

## 2022-01-01 DIAGNOSIS — N25.81 SECONDARY HYPERPARATHYROIDISM OF RENAL ORIGIN (HCC): ICD-10-CM

## 2022-01-01 LAB
ALBUMIN SERPL BCP-MCNC: 3 G/DL (ref 3.5–5)
ALBUMIN SERPL BCP-MCNC: 3.2 G/DL (ref 3.5–5)
ALP SERPL-CCNC: 46 U/L (ref 46–116)
ALP SERPL-CCNC: 47 U/L (ref 46–116)
ALT SERPL W P-5'-P-CCNC: 13 U/L (ref 12–78)
ALT SERPL W P-5'-P-CCNC: 15 U/L (ref 12–78)
ANION GAP SERPL CALCULATED.3IONS-SCNC: 5 MMOL/L (ref 4–13)
ANION GAP SERPL CALCULATED.3IONS-SCNC: 9 MMOL/L (ref 4–13)
AST SERPL W P-5'-P-CCNC: 14 U/L (ref 5–45)
AST SERPL W P-5'-P-CCNC: 8 U/L (ref 5–45)
B BURGDOR IGG PATRN SER IB-IMP: NEGATIVE
B BURGDOR IGG+IGM SER-ACNC: 146
B BURGDOR IGM PATRN SER IB-IMP: NEGATIVE
B BURGDOR18KD IGG SER QL IB: ABNORMAL
B BURGDOR23KD IGG SER QL IB: ABNORMAL
B BURGDOR23KD IGM SER QL IB: ABNORMAL
B BURGDOR28KD IGG SER QL IB: ABNORMAL
B BURGDOR30KD IGG SER QL IB: ABNORMAL
B BURGDOR39KD IGG SER QL IB: ABNORMAL
B BURGDOR39KD IGM SER QL IB: ABNORMAL
B BURGDOR41KD IGG SER QL IB: PRESENT
B BURGDOR41KD IGM SER QL IB: ABNORMAL
B BURGDOR45KD IGG SER QL IB: ABNORMAL
B BURGDOR58KD IGG SER QL IB: ABNORMAL
B BURGDOR66KD IGG SER QL IB: ABNORMAL
B BURGDOR93KD IGG SER QL IB: ABNORMAL
BASOPHILS # BLD AUTO: 0.09 THOUSANDS/ΜL (ref 0–0.1)
BASOPHILS # BLD AUTO: 0.13 THOUSANDS/ΜL (ref 0–0.1)
BASOPHILS NFR BLD AUTO: 1 % (ref 0–1)
BASOPHILS NFR BLD AUTO: 1 % (ref 0–1)
BILIRUB SERPL-MCNC: 0.22 MG/DL (ref 0.2–1)
BILIRUB SERPL-MCNC: 0.23 MG/DL (ref 0.2–1)
BUN SERPL-MCNC: 61 MG/DL (ref 5–25)
BUN SERPL-MCNC: 74 MG/DL (ref 5–25)
CALCIUM ALBUM COR SERPL-MCNC: 10.4 MG/DL (ref 8.3–10.1)
CALCIUM ALBUM COR SERPL-MCNC: 9.9 MG/DL (ref 8.3–10.1)
CALCIUM SERPL-MCNC: 9.1 MG/DL (ref 8.3–10.1)
CALCIUM SERPL-MCNC: 9.8 MG/DL (ref 8.3–10.1)
CHLORIDE SERPL-SCNC: 112 MMOL/L (ref 100–108)
CHLORIDE SERPL-SCNC: 115 MMOL/L (ref 100–108)
CHOLEST SERPL-MCNC: 294 MG/DL
CK SERPL-CCNC: 94 U/L (ref 39–308)
CO2 SERPL-SCNC: 20 MMOL/L (ref 21–32)
CO2 SERPL-SCNC: 22 MMOL/L (ref 21–32)
CREAT SERPL-MCNC: 3.37 MG/DL (ref 0.6–1.3)
CREAT SERPL-MCNC: 3.93 MG/DL (ref 0.6–1.3)
CREAT UR-MCNC: 56.6 MG/DL
CRP SERPL QL: 4.9 MG/L
EOSINOPHIL # BLD AUTO: 0.4 THOUSAND/ΜL (ref 0–0.61)
EOSINOPHIL # BLD AUTO: 0.43 THOUSAND/ΜL (ref 0–0.61)
EOSINOPHIL NFR BLD AUTO: 3 % (ref 0–6)
EOSINOPHIL NFR BLD AUTO: 4 % (ref 0–6)
ERYTHROCYTE [DISTWIDTH] IN BLOOD BY AUTOMATED COUNT: 17.1 % (ref 11.6–15.1)
ERYTHROCYTE [DISTWIDTH] IN BLOOD BY AUTOMATED COUNT: 17.6 % (ref 11.6–15.1)
ERYTHROCYTE [SEDIMENTATION RATE] IN BLOOD: 37 MM/HOUR (ref 0–19)
EST. AVERAGE GLUCOSE BLD GHB EST-MCNC: 111 MG/DL
GFR SERPL CREATININE-BSD FRML MDRD: 13 ML/MIN/1.73SQ M
GFR SERPL CREATININE-BSD FRML MDRD: 16 ML/MIN/1.73SQ M
GLUCOSE P FAST SERPL-MCNC: 104 MG/DL (ref 65–99)
GLUCOSE P FAST SERPL-MCNC: 111 MG/DL (ref 65–99)
HBA1C MFR BLD: 5.5 %
HCT VFR BLD AUTO: 32.6 % (ref 36.5–49.3)
HCT VFR BLD AUTO: 40.2 % (ref 36.5–49.3)
HDLC SERPL-MCNC: 38 MG/DL
HGB BLD-MCNC: 10 G/DL (ref 12–17)
HGB BLD-MCNC: 12.2 G/DL (ref 12–17)
IMM GRANULOCYTES # BLD AUTO: 0.07 THOUSAND/UL (ref 0–0.2)
IMM GRANULOCYTES # BLD AUTO: 0.08 THOUSAND/UL (ref 0–0.2)
IMM GRANULOCYTES NFR BLD AUTO: 1 % (ref 0–2)
IMM GRANULOCYTES NFR BLD AUTO: 1 % (ref 0–2)
LDLC SERPL DIRECT ASSAY-MCNC: 184 MG/DL (ref 0–100)
LYMPHOCYTES # BLD AUTO: 1.89 THOUSANDS/ΜL (ref 0.6–4.47)
LYMPHOCYTES # BLD AUTO: 2.27 THOUSANDS/ΜL (ref 0.6–4.47)
LYMPHOCYTES NFR BLD AUTO: 15 % (ref 14–44)
LYMPHOCYTES NFR BLD AUTO: 22 % (ref 14–44)
MCH RBC QN AUTO: 28 PG (ref 26.8–34.3)
MCH RBC QN AUTO: 28.4 PG (ref 26.8–34.3)
MCHC RBC AUTO-ENTMCNC: 30.3 G/DL (ref 31.4–37.4)
MCHC RBC AUTO-ENTMCNC: 30.7 G/DL (ref 31.4–37.4)
MCV RBC AUTO: 92 FL (ref 82–98)
MCV RBC AUTO: 93 FL (ref 82–98)
MICROALBUMIN UR-MCNC: 59.3 MG/L (ref 0–20)
MICROALBUMIN/CREAT 24H UR: 105 MG/G CREATININE (ref 0–30)
MONOCYTES # BLD AUTO: 1 THOUSAND/ΜL (ref 0.17–1.22)
MONOCYTES # BLD AUTO: 1.17 THOUSAND/ΜL (ref 0.17–1.22)
MONOCYTES NFR BLD AUTO: 11 % (ref 4–12)
MONOCYTES NFR BLD AUTO: 8 % (ref 4–12)
NEUTROPHILS # BLD AUTO: 6.53 THOUSANDS/ΜL (ref 1.85–7.62)
NEUTROPHILS # BLD AUTO: 9.54 THOUSANDS/ΜL (ref 1.85–7.62)
NEUTS SEG NFR BLD AUTO: 61 % (ref 43–75)
NEUTS SEG NFR BLD AUTO: 72 % (ref 43–75)
NRBC BLD AUTO-RTO: 0 /100 WBCS
NRBC BLD AUTO-RTO: 0 /100 WBCS
PHOSPHATE SERPL-MCNC: 3.3 MG/DL (ref 2.3–4.1)
PLATELET # BLD AUTO: 396 THOUSANDS/UL (ref 149–390)
PLATELET # BLD AUTO: 420 THOUSANDS/UL (ref 149–390)
PMV BLD AUTO: 10.3 FL (ref 8.9–12.7)
PMV BLD AUTO: 10.7 FL (ref 8.9–12.7)
POTASSIUM SERPL-SCNC: 4.5 MMOL/L (ref 3.5–5.3)
POTASSIUM SERPL-SCNC: 4.7 MMOL/L (ref 3.5–5.3)
PROT SERPL-MCNC: 6.7 G/DL (ref 6.4–8.2)
PROT SERPL-MCNC: 6.9 G/DL (ref 6.4–8.2)
PTH-INTACT SERPL-MCNC: 143.7 PG/ML (ref 18.4–80.1)
RBC # BLD AUTO: 3.52 MILLION/UL (ref 3.88–5.62)
RBC # BLD AUTO: 4.35 MILLION/UL (ref 3.88–5.62)
SARS-COV-2 IGG SERPL QL IA: REACTIVE
SARS-COV-2 IGG+IGM SERPL QL IA: REACTIVE
SODIUM SERPL-SCNC: 140 MMOL/L (ref 136–145)
SODIUM SERPL-SCNC: 143 MMOL/L (ref 136–145)
TSH SERPL DL<=0.05 MIU/L-ACNC: 1.32 UIU/ML (ref 0.45–4.5)
WBC # BLD AUTO: 10.53 THOUSAND/UL (ref 4.31–10.16)
WBC # BLD AUTO: 13.07 THOUSAND/UL (ref 4.31–10.16)

## 2022-01-01 PROCEDURE — 86140 C-REACTIVE PROTEIN: CPT

## 2022-01-01 PROCEDURE — 82550 ASSAY OF CK (CPK): CPT

## 2022-01-01 PROCEDURE — 1160F RVW MEDS BY RX/DR IN RCRD: CPT | Performed by: FAMILY MEDICINE

## 2022-01-01 PROCEDURE — 83036 HEMOGLOBIN GLYCOSYLATED A1C: CPT

## 2022-01-01 PROCEDURE — 99214 OFFICE O/P EST MOD 30 MIN: CPT | Performed by: FAMILY MEDICINE

## 2022-01-01 PROCEDURE — 85025 COMPLETE CBC W/AUTO DIFF WBC: CPT

## 2022-01-01 PROCEDURE — 80053 COMPREHEN METABOLIC PANEL: CPT

## 2022-01-01 PROCEDURE — 1160F RVW MEDS BY RX/DR IN RCRD: CPT | Performed by: INTERNAL MEDICINE

## 2022-01-01 PROCEDURE — 83970 ASSAY OF PARATHORMONE: CPT

## 2022-01-01 PROCEDURE — 84100 ASSAY OF PHOSPHORUS: CPT

## 2022-01-01 PROCEDURE — 3075F SYST BP GE 130 - 139MM HG: CPT | Performed by: FAMILY MEDICINE

## 2022-01-01 PROCEDURE — 84443 ASSAY THYROID STIM HORMONE: CPT

## 2022-01-01 PROCEDURE — 82570 ASSAY OF URINE CREATININE: CPT

## 2022-01-01 PROCEDURE — 82043 UR ALBUMIN QUANTITATIVE: CPT

## 2022-01-01 PROCEDURE — 86769 SARS-COV-2 COVID-19 ANTIBODY: CPT

## 2022-01-01 PROCEDURE — 86618 LYME DISEASE ANTIBODY: CPT

## 2022-01-01 PROCEDURE — 3078F DIAST BP <80 MM HG: CPT | Performed by: FAMILY MEDICINE

## 2022-01-01 PROCEDURE — 3078F DIAST BP <80 MM HG: CPT | Performed by: INTERNAL MEDICINE

## 2022-01-01 PROCEDURE — 93000 ELECTROCARDIOGRAM COMPLETE: CPT | Performed by: INTERNAL MEDICINE

## 2022-01-01 PROCEDURE — 99214 OFFICE O/P EST MOD 30 MIN: CPT | Performed by: INTERNAL MEDICINE

## 2022-01-01 PROCEDURE — 86617 LYME DISEASE ANTIBODY: CPT

## 2022-01-01 PROCEDURE — 3077F SYST BP >= 140 MM HG: CPT | Performed by: INTERNAL MEDICINE

## 2022-01-01 PROCEDURE — 85652 RBC SED RATE AUTOMATED: CPT

## 2022-01-01 PROCEDURE — 83718 ASSAY OF LIPOPROTEIN: CPT

## 2022-01-01 PROCEDURE — 82465 ASSAY BLD/SERUM CHOLESTEROL: CPT

## 2022-01-01 PROCEDURE — 36415 COLL VENOUS BLD VENIPUNCTURE: CPT

## 2022-01-01 PROCEDURE — 83721 ASSAY OF BLOOD LIPOPROTEIN: CPT

## 2022-01-01 PROCEDURE — 3725F SCREEN DEPRESSION PERFORMED: CPT | Performed by: FAMILY MEDICINE

## 2022-01-01 RX ORDER — MIRTAZAPINE 45 MG/1
45 TABLET, FILM COATED ORAL
Qty: 90 TABLET | Refills: 1 | Status: SHIPPED | OUTPATIENT
Start: 2022-01-01

## 2022-01-01 RX ORDER — ATORVASTATIN CALCIUM 80 MG/1
80 TABLET, FILM COATED ORAL DAILY
Qty: 90 TABLET | Refills: 2 | Status: SHIPPED | OUTPATIENT
Start: 2022-01-01

## 2022-01-01 RX ORDER — HYDRALAZINE HYDROCHLORIDE 100 MG/1
100 TABLET, FILM COATED ORAL 2 TIMES DAILY
Qty: 180 TABLET | Refills: 0 | Status: SHIPPED | OUTPATIENT
Start: 2022-01-01 | End: 2022-01-01

## 2022-01-01 RX ORDER — HYDRALAZINE HYDROCHLORIDE 25 MG/1
25 TABLET, FILM COATED ORAL 2 TIMES DAILY
Qty: 180 TABLET | Refills: 3 | Status: SHIPPED | OUTPATIENT
Start: 2022-01-01 | End: 2022-01-01

## 2022-01-01 RX ORDER — AMLODIPINE BESYLATE 10 MG/1
10 TABLET ORAL DAILY
COMMUNITY
End: 2022-01-01 | Stop reason: SINTOL

## 2022-01-01 RX ORDER — HYDRALAZINE HYDROCHLORIDE 50 MG/1
50 TABLET, FILM COATED ORAL 2 TIMES DAILY
Qty: 180 TABLET | Refills: 3
Start: 2022-01-01

## 2022-01-01 RX ORDER — MORPHINE SULFATE 15 MG/1
TABLET ORAL
COMMUNITY
Start: 2022-01-01

## 2022-01-31 NOTE — TELEPHONE ENCOUNTER
I have looked over the chart and he was admitted to [facility] site on June 28, 2021 and I am not sure what the dated discharge was  He was discharged on hydralazine 100 mg t i d  He was also seen in the ED at [facility] on 07/16/2021 and they list his medications as hydralazine 100 mg t i d  I saw him in consultation for the 1st time on August 9th 2021 in the office and at that time he must not have given us an accurate list of his medications since his hydralazine is listed as 25 mg b i d   Had patient given us an accurate list of his medications at the time of his appointment, this problem would not be occurring    Please call his pharmacy and find out what dose of hydralazine he has been receiving if any and who has been prescribing it since I certainly have not been    If he was discharged from [facility] with 1 month supply without refills, hydralazine 100 mg t i d  would have run out the end of July or beginning of August   If he has been off hydralazine for that long period of time I do not feel safe restarting it now

## 2022-01-31 NOTE — TELEPHONE ENCOUNTER
Regarding pt's MyChart message for increasing his Mirtazapine: I called pt to schedule a sooner appointment per Dr Cadence Maya, but pt is not interested at this time  Pt declined appointment and wants to wait until his appointment on 2/25/22  Pt will call the office if he would like to be seen sooner

## 2022-02-03 NOTE — TELEPHONE ENCOUNTER
Pt would like hydralazine 100 mg BID refilled  Med list states not taking  Please advise/ refill if necessary

## 2022-02-13 NOTE — PROGRESS NOTES
Last rx prior was Hydralazine 100 mg BID #270 written on 7/3/21 by Dr Ruslan Quintana  This would of last patient 135 days  I do not know how many he had at home when the prescription was renewed but I would of thought he would have run out by now  I gave him a prescription for 90 days and would like to see him in the office in the interum to make sure he is tolerating it

## 2022-02-25 PROBLEM — F33.0 MILD EPISODE OF RECURRENT MAJOR DEPRESSIVE DISORDER (HCC): Status: RESOLVED | Noted: 2021-01-01 | Resolved: 2022-01-01

## 2022-02-25 PROBLEM — N18.6 ESRD (END STAGE RENAL DISEASE) (HCC): Status: ACTIVE | Noted: 2022-01-01

## 2022-02-25 PROBLEM — F11.20 CONTINUOUS OPIOID DEPENDENCE (HCC): Status: ACTIVE | Noted: 2022-01-01

## 2022-02-25 PROBLEM — G93.40 ENCEPHALOPATHY ACUTE: Status: RESOLVED | Noted: 2017-09-07 | Resolved: 2022-01-01

## 2022-02-25 PROBLEM — R80.0 ISOLATED PROTEINURIA: Status: ACTIVE | Noted: 2020-12-10

## 2022-02-25 PROBLEM — R31.9 HEMATURIA: Status: RESOLVED | Noted: 2020-04-02 | Resolved: 2022-01-01

## 2022-02-25 PROBLEM — I21.4 NSTEMI (NON-ST ELEVATED MYOCARDIAL INFARCTION) (HCC): Status: RESOLVED | Noted: 2017-04-13 | Resolved: 2022-01-01

## 2022-02-25 NOTE — ASSESSMENT & PLAN NOTE
Blood pressure today was reasonable  He did mention that he had amlodipine added recently  This was in the hospital   10 mg  Continue with other treatments including Coreg  Recheck in several months

## 2022-02-25 NOTE — PROGRESS NOTES
Assessment and Plan:    Problem List Items Addressed This Visit     Anemia of chronic disease     Check labs in future  No changes otherwise  Chronic kidney disease, stage 4 (severe) (HCC)     Lab Results   Component Value Date    EGFR 17 11/17/2021    EGFR 18 04/20/2021    EGFR 20 12/07/2020    CREATININE 3 31 (H) 11/17/2021    CREATININE 3 22 (H) 04/20/2021    CREATININE 2 92 (H) 12/07/2020   Patient continues to follow with Nephrology  He reports he was in Dialysis class  Shunt scheduled for near future  Chronic obstructive pulmonary disease ([facility] )     Patient has been OK with SOB  Not using medications  Chronic pain syndrome     Continue with Pain management  Continues on Oxy  Clear cell carcinoma of kidney (HCC)     Follow with renal and Uro  Continuous opioid dependence ([facility] )     Patient continues on oxycodone from pain management  This seems to be the only thing that helps him as far as his chronic pain  Coronary artery disease involving native coronary artery of native heart without angina pectoris     Has seen cardio  Doing well  Relevant Medications    amLODIPine (NORVASC) 10 mg tablet    Depression     Using Mirtazepine 30mg 1 5 tabs  Agree to change to 45mg tabs  He has been on other meds before, but not able to take  ESRD (end stage renal disease) [facility])     Lab Results   Component Value Date    EGFR 17 11/17/2021    EGFR 18 04/20/2021    EGFR 20 12/07/2020    CREATININE 3 31 (H) 11/17/2021    CREATININE 3 22 (H) 04/20/2021    CREATININE 2 92 (H) 12/07/2020   Stable  No change  Patient is currently doing well  His GFR is about the same as before  Following up with Nephrology           Hyperlipidemia associated with type 2 diabetes mellitus ([facility] ) - Primary       Lab Results   Component Value Date    HGBA1C 5 6 06/28/2021   Patient's cholesterol has been elevated in the past   He is on Lipitor at maximum dose, 80 mg   Recommend recheck labs  He does have an order in, so he can go for that at any point  Relevant Medications    atorvastatin (Lipitor) 80 mg tablet    Hypertension     Blood pressure today was reasonable  He did mention that he had amlodipine added recently  This was in the hospital   10 mg  Continue with other treatments including Coreg  Recheck in several months  Relevant Medications    amLODIPine (NORVASC) 10 mg tablet    Prostate cancer [facility])     Patient follows with Urology  Will not order PSA specifically  Secondary adrenal insufficiency ([facility] )     Noted before  Patient following with Endocrine  Continues on prednisone  Tobacco abuse     Patient does continue to smoke  Not interested in quitting at the moment  Type 2 diabetes mellitus ([facility] )       Lab Results   Component Value Date    HGBA1C 5 6 06/28/2021   Patient's last A1c was reasonable  Check labs  Diagnoses and all orders for this visit:    Hyperlipidemia associated with type 2 diabetes mellitus ([facility] )  -     atorvastatin (Lipitor) 80 mg tablet; Take 1 tablet (80 mg total) by mouth daily    Mild episode of recurrent major depressive disorder (HCC)    Pulmonary emphysema, unspecified emphysema type (HCC)    Chronic kidney disease, stage 4 (severe) (HCC)    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)    Chronic pain syndrome    Coronary artery disease involving native coronary artery of native heart without angina pectoris    Primary hypertension    Prostate cancer ([facility] )    Secondary adrenal insufficiency (HCC)    Type 2 diabetes mellitus with stage 4 chronic kidney disease, without long-term current use of insulin (HCC)    Tobacco abuse    Anemia of chronic disease    Continuous opioid dependence ([facility] )    ESRD (end stage renal disease) ([facility] )    Other orders  -     amLODIPine (NORVASC) 10 mg tablet;  Take 10 mg by mouth daily              Subjective:      Patient ID: Cathy Spencer is a 68 y o  male  CC:    Chief Complaint   Patient presents with    Follow-up     pt here for a follow up  AMIRA Campbell       HPI:    Patient here to follow up on multiple issues  Reviewed about CBD  Had medical marijuana card before  Did not like the feeling from that  He would like to use this for depression  Cholesterol: On Lipitor  No recent testing  Chronic pain: From pinched nerve  On Oxy  Seeing [facility]  CAD: Has been doing well  No current symptoms  Sees cardio  Depression: On Mirtazapine, using 45mg now  Feels a bit better, but still not himself  Problems getting out (does not wasn't to leave the house)  COPD: Feels good  Not using MDI's  The following portions of the patient's history were reviewed and updated as appropriate: allergies, current medications, past family history, past medical history, past social history, past surgical history and problem list       Review of Systems   Constitutional: Negative  HENT: Negative  Respiratory: Negative  Cardiovascular: Negative  Gastrointestinal: Negative  All other systems reviewed and are negative  Data to review:       Objective:    Vitals:    02/25/22 1520   BP: 130/72   BP Location: Left arm   Patient Position: Sitting   Cuff Size: Large   Pulse: 72   Resp: 16   Weight: 63 5 kg (140 lb)   Height: 5' 7" (1 702 m)        Physical Exam  Vitals and nursing note reviewed  Constitutional:       Appearance: Normal appearance  Neck:      Vascular: No carotid bruit  Cardiovascular:      Rate and Rhythm: Normal rate and regular rhythm  Pulses: Normal pulses  Carotid pulses are 2+ on the right side and 2+ on the left side  Heart sounds: Normal heart sounds  No murmur heard  No gallop  Pulmonary:      Effort: Pulmonary effort is normal  No respiratory distress  Breath sounds: Normal breath sounds  No stridor  No wheezing, rhonchi or rales     Chest:      Chest wall: No tenderness  Neurological:      Mental Status: He is alert  Tobacco Cessation Counseling: Tobacco cessation counseling was provided  The patient is sincerely urged to quit consumption of tobacco  He is not ready to quit tobacco  Medication options and side effects of medication discussed  Patient refused medication

## 2022-02-25 NOTE — ASSESSMENT & PLAN NOTE
Lab Results   Component Value Date    HGBA1C 5 6 06/28/2021   Patient's cholesterol has been elevated in the past   He is on Lipitor at maximum dose, 80 mg   Recommend recheck labs  He does have an order in, so he can go for that at any point

## 2022-02-25 NOTE — PATIENT INSTRUCTIONS
Problem List Items Addressed This Visit     Anemia of chronic disease     Check labs in future  No changes otherwise  Chronic kidney disease, stage 4 (severe) ([facility])     Lab Results   Component Value Date    EGFR 17 11/17/2021    EGFR 18 04/20/2021    EGFR 20 12/07/2020    CREATININE 3 31 (H) 11/17/2021    CREATININE 3 22 (H) 04/20/2021    CREATININE 2 92 (H) 12/07/2020   Patient continues to follow with Nephrology  He reports he was in Dialysis class  Shunt scheduled for near future  Chronic obstructive pulmonary disease ([facility] )     Patient has been OK with SOB  Not using medications  Chronic pain syndrome     Continue with Pain management  Continues on Oxy  Clear cell carcinoma of kidney (HCC)     Follow with renal and Uro  Continuous opioid dependence ([facility] )     Patient continues on oxycodone from pain management  This seems to be the only thing that helps him as far as his chronic pain  Coronary artery disease involving native coronary artery of native heart without angina pectoris     Has seen cardio  Doing well  Relevant Medications    amLODIPine (NORVASC) 10 mg tablet    Depression     Using Mirtazepine 30mg 1 5 tabs  Agree to change to 45mg tabs  He has been on other meds before, but not able to take  ESRD (end stage renal disease) [facility])     Lab Results   Component Value Date    EGFR 17 11/17/2021    EGFR 18 04/20/2021    EGFR 20 12/07/2020    CREATININE 3 31 (H) 11/17/2021    CREATININE 3 22 (H) 04/20/2021    CREATININE 2 92 (H) 12/07/2020   Stable  No change  Patient is currently doing well  His GFR is about the same as before  Following up with Nephrology           Hyperlipidemia associated with type 2 diabetes mellitus ([facility] ) - Primary       Lab Results   Component Value Date    HGBA1C 5 6 06/28/2021   Patient's cholesterol has been elevated in the past   He is on Lipitor at maximum dose, 80 mg   Recommend recheck labs   He does have an order in, so he can go for that at any point  Relevant Medications    atorvastatin (Lipitor) 80 mg tablet    Hypertension     Blood pressure today was reasonable  He did mention that he had amlodipine added recently  This was in the hospital   10 mg  Continue with other treatments including Coreg  Recheck in several months  Relevant Medications    amLODIPine (NORVASC) 10 mg tablet    Prostate cancer [facility])     Patient follows with Urology  Will not order PSA specifically  Secondary adrenal insufficiency ([facility] )     Noted before  Patient following with Endocrine  Continues on prednisone  Tobacco abuse     Patient does continue to smoke  Not interested in quitting at the moment  Type 2 diabetes mellitus ([facility] )       Lab Results   Component Value Date    HGBA1C 5 6 06/28/2021   Patient's last A1c was reasonable  Check labs  COVID 19 Instructions    Anne Garcia was advised to limit contact with others to essential tasks such as getting food, medications, and medical care  Proper handwashing reviewed, and Hand sanitzer when washing is not available  If the patient develops symptoms of COVID 19, the patient should call the office as soon as possible  For 6438-8540 Flu season, it is strongly recommended that Flu Vaccinations be obtained  Virtual Visits:  [place]: This works on smart phones (any phone with Internet browsing capability)  You should get a text message when the provider is ready to see you  Click on the link in the text message, and the call should start  You will need to type in your name, and allow camera and microphone access  This is HIPPA compliant, and secure  If you have not already done so, get immunized to COVID 19  We are committed to getting you vaccinated as soon as possible and will be closely following CDC and [facility]  guidelines as they are released and revised    Please refer to our COVID-19 vaccine webpage for the most up to date information on the vaccine and our distribution efforts  This site will also have the most up to date recommendations for COVID booster vaccine  [place]ale[place]ara tn    Call [phone] ([phone]), option 7    OUR NEW LOCATION:    [address], [facility] [address] , [place], [address]  Fax: [phone]    Lab services and OB/GYN are at this location as well

## 2022-02-25 NOTE — ASSESSMENT & PLAN NOTE
Lab Results   Component Value Date    EGFR 17 11/17/2021    EGFR 18 04/20/2021    EGFR 20 12/07/2020    CREATININE 3 31 (H) 11/17/2021    CREATININE 3 22 (H) 04/20/2021    CREATININE 2 92 (H) 12/07/2020   Stable  No change  Patient is currently doing well  His GFR is about the same as before  Following up with Nephrology

## 2022-02-25 NOTE — ASSESSMENT & PLAN NOTE
Lab Results   Component Value Date    HGBA1C 5 6 06/28/2021   Patient's last A1c was reasonable  Check labs

## 2022-02-25 NOTE — ASSESSMENT & PLAN NOTE
Patient continues on oxycodone from pain management  This seems to be the only thing that helps him as far as his chronic pain

## 2022-02-25 NOTE — ASSESSMENT & PLAN NOTE
Using Mirtazepine 30mg 1 5 tabs  Agree to change to 45mg tabs  He has been on other meds before, but not able to take

## 2022-02-25 NOTE — ASSESSMENT & PLAN NOTE
Lab Results   Component Value Date    EGFR 17 11/17/2021    EGFR 18 04/20/2021    EGFR 20 12/07/2020    CREATININE 3 31 (H) 11/17/2021    CREATININE 3 22 (H) 04/20/2021    CREATININE 2 92 (H) 12/07/2020   Patient continues to follow with Nephrology  He reports he was in Dialysis class  Shunt scheduled for near future

## 2022-03-17 PROBLEM — Z95.5 HISTORY OF HEART ARTERY STENT: Status: ACTIVE | Noted: 2022-01-01

## 2022-03-17 NOTE — PROGRESS NOTES
[facility]  [id number] [facility]   [phone]  [id number]  Phone#  [phone]   Fax#  [phone]  *-*-*-*-*-*-*-*-*-*-*-*-*-*-*-*-*-*-*-*-*-*-*-*-*-*-*-*-*-*-*-*-*-*-*-*-*-*-*-*-*-*-*-*-*-*-*-*-*-*-*-*-*-*                                   Cardiology Follow Up      ENCOUNTER DATE: 22 3:56 PM  PATIENT NAME: Fabiola Mora   : 1945    MRN: [id number]  AGE:76 y o  SEX: male  [address] Dequan Grissom MD     PRIMARY CARE PHYSICIAN: Geri Avalos MD    ACTIVE DIAGNOSIS THIS VISIT  1  Coronary artery disease involving native coronary artery of native heart without angina pectoris  POCT ECG   2  Pure hypercholesterolemia     3  DIEUDONNE (obstructive sleep apnea)     4  Chronic kidney disease, stage 4 (severe) ([facility])     5  Type 2 diabetes mellitus with stage 4 chronic kidney disease, without long-term current use of insulin ([facility] [facility]     6  Primary hypertension     7  Hyperlipidemia associated with type 2 diabetes mellitus ([address] )     8  Pulmonary emphysema, unspecified emphysema type ([address] )     9  Tobacco abuse     10  Secondary adrenal insufficiency ([address] )     11   History of non-ST elevation myocardial infarction (NSTEMI)       ACTIVE PROBLEM LIST  Patient Active Problem List   Diagnosis    Atopic dermatitis    Anxiety    Bradycardia, sinus    DIEUDONNE (obstructive sleep apnea)    Cervical disc disease with myelopathy    Chronic pain syndrome    Chronic kidney disease, stage 4 (severe) ([facility])    Type 2 diabetes mellitus ([address] )    Coronary artery disease involving native coronary artery of native heart without angina pectoris    DDD (degenerative disc disease), lumbar    Depression    DNR (do not resuscitate)    Eczema    Hypertension    Hyperlipidemia associated with type 2 diabetes mellitus ([address] )    Increased frequency of urination    Iron deficiency anemia    Long term (current) use of systemic steroids    Prostate cancer ([facility])    Ptosis, bilateral    Chronic obstructive pulmonary disease ([facility] )    Clear cell carcinoma of kidney (HCC)    Restless legs syndrome (RLS)    Retroperitoneal lymphadenopathy    Secondary adrenal insufficiency (HCC)    Tobacco abuse    Adenomatous duodenal polyp    Vitamin D deficiency    Secondary hyperparathyroidism [facility]    Hypertensive retinopathy, bilateral    Age-related nuclear cataract, bilateral    Osteoporosis without current pathological fracture    History of non-ST elevation myocardial infarction (NSTEMI)    Pure hypercholesterolemia    Intermediate stage nonexudative age-related macular degeneration of both eyes    Cervical disc disorder with radiculopathy of mid-cervical region    Neural foraminal stenosis of cervical spine    Neck pain    Anemia of chronic disease    Isolated proteinuria    Continuous opioid dependence [facility]    ESRD (end stage renal disease) ([facility] )       CARDIOLOGY SPECIALTY COMMENTS  NSTEMI (non-ST elevated myocardial infarction) 04/13/2017  Last Assessment & Plan:     Formatting of this note might be different from the original   S/p PARVEEN to OM1 (at proximal margin of previous stent)  No other significant CAD  I recommend aggressive risk factor modification  We discussed the importance of taking Plavix and Aspirin everyday as prescribed and not missing any doses for a minimum of one year  Aspirin will be continued indefinitely (of note, he's been on long-term DAPT since his MI 4/13/17)  The importance of the other cardiac medications was discussed, as well  Declined referral to cardiac rehab  CAD/NSTEMI s/p PARVEEN to LCx OM2 (Nov 2018) 07/17/2018 07/17/2021 LVH echocardiogram   Normal biventricular size and function  No significant valvular abnormalities  INTERVAL HISTORY:         patient with history of non ST-elevation myocardial infarction and PARVEEN stent to OM 2 returns  Patient has no cardiac complaints  Patient denies chest discomfort or shortness of breath  Patient has no palpitations  Patient denies symptoms of dizziness, lightheadedness or near-syncope/syncope  Patient denies leg edema  Patient denies symptoms of orthopnea or paroxysmal nocturnal dyspnea  He has end-stage kidney disease  He also has significant hypertension  Today's blood pressure was 150/74 but patient states that for most days his systolic blood pressure is usually around 130      DISCUSSION/PLAN:           continue present management and return in 6 months    Lab Studies:    Lab Results   Component Value Date    CHOLESTEROL 294 (H) 03/01/2022    CHOLESTEROL 152 04/20/2021    CHOLESTEROL 162 10/01/2020     Lab Results   Component Value Date    TRIG 141 10/01/2020    TRIG 140 01/15/2020     Lab Results   Component Value Date    HDL 38 (L) 03/01/2022    HDL 48 04/20/2021    HDL 48 10/01/2020     Lab Results   Component Value Date    LDLCALC 86 10/01/2020    LDLCALC 101 (H) 01/15/2020       Lab Results   Component Value Date    LDLDIRECT 184 (H) 03/01/2022    LDLDIRECT 89 04/20/2021       Lab Results   Component Value Date    HGBA1C 5 5 03/01/2022      Lab Results   Component Value Date    EGFR 16 03/01/2022    EGFR 17 11/17/2021    EGFR 18 04/20/2021    SODIUM 140 03/01/2022    SODIUM 141 11/17/2021    SODIUM 143 04/20/2021    K 4 5 03/01/2022    K 4 7 11/17/2021    K 5 0 04/20/2021     (H) 03/01/2022     (H) 11/17/2021     (H) 04/20/2021    CO2 20 (L) 03/01/2022    CO2 21 11/17/2021    CO2 23 04/20/2021    BUN 61 (H) 03/01/2022    BUN 48 (H) 11/17/2021    BUN 48 (H) 04/20/2021    CREATININE 3 37 (H) 03/01/2022    CREATININE 3 31 (H) 11/17/2021    CREATININE 3 22 (H) 04/20/2021     Lab Results   Component Value Date    WBC 13 07 (H) 03/01/2022    WBC 9 30 11/17/2021    WBC 8 62 12/07/2020    HGB 10 0 (L) 03/01/2022    HGB 10 8 (L) 11/17/2021    HGB 10 2 (L) 12/07/2020    HCT 32 6 (L) 03/01/2022    HCT 35 8 (L) 11/17/2021    HCT 33 4 (L) 12/07/2020    MCV 93 03/01/2022    MCV 96 11/17/2021    MCV 97 12/07/2020    MCH 28 4 03/01/2022    MCH 28 9 11/17/2021    MCH 29 5 12/07/2020    MCHC 30 7 (L) 03/01/2022    MCHC 30 2 (L) 11/17/2021    MCHC 30 5 (L) 12/07/2020     (H) 03/01/2022     11/17/2021     12/07/2020      Lab Results   Component Value Date    CALCIUM 9 1 03/01/2022    CALCIUM 8 7 11/17/2021    CALCIUM 8 6 04/20/2021    AST 8 03/01/2022    AST 11 04/20/2021    AST 10 10/01/2020    ALT 13 03/01/2022    ALT 15 04/20/2021    ALT 19 10/01/2020    ALKPHOS 46 03/01/2022    ALKPHOS 50 04/20/2021    ALKPHOS 62 10/01/2020       Lab Results   Component Value Date    FERRITIN 21 11/17/2021    IRON 38 (L) 12/07/2020    TIBC 266 12/07/2020       Results for orders placed or performed in visit on 03/17/22   POCT ECG    Narrative    Normal sinus rhythm at a rate of 61 beats per minute  Minor nonspecific ST T wave abnormalities  Abnormal EKG           Current Outpatient Medications:     amLODIPine (NORVASC) 10 mg tablet, Take 10 mg by mouth daily, Disp: , Rfl:     aspirin (ASPIRIN 81) 81 mg EC tablet, Take 81 mg by mouth daily, Disp: , Rfl:     atorvastatin (Lipitor) 80 mg tablet, Take 1 tablet (80 mg total) by mouth daily, Disp: 90 tablet, Rfl: 2    calcitriol (ROCALTROL) 0 25 mcg capsule, every other day, Disp: , Rfl:     carvedilol (COREG) 25 mg tablet, Take 1 tablet (25 mg total) by mouth 2 (two) times a day with meals, Disp: 180 tablet, Rfl: 3    Cholecalciferol (SM VITAMIN D3) 50 MCG (2000 UT) CAPS, Take 1 capsule (2,000 Units total) by mouth daily, Disp: 30 capsule, Rfl: 11    clopidogrel (PLAVIX) 75 mg tablet, Take 1 tablet (75 mg total) by mouth daily, Disp: 90 tablet, Rfl: 3    ferrous sulfate 325 (65 Fe) mg tablet, Take 325 mg by mouth, Disp: , Rfl:     hydrALAZINE (APRESOLINE) 100 MG tablet, Take 1 tablet (100 mg total) by mouth 2 (two) times a day, Disp: 180 tablet, Rfl: 0    isosorbide mononitrate (IMDUR) 30 mg 24 hr tablet, Take 30 mg by mouth daily, Disp: , Rfl:    mirtazapine (REMERON) 30 mg tablet, TAKE 1 TABLET (30 MG TOTAL) BY MOUTH DAILY AT BEDTIME, Disp: 90 tablet, Rfl: 1    Multiple Vitamins-Minerals (Ocuvite-Lutein) CAPS, Take 1 capsule by mouth daily, Disp: , Rfl:     oxyCODONE (ROXICODONE) 10 MG TABS, , Disp: , Rfl:     predniSONE 10 mg tablet, Take 1 tablet (10 mg total) by mouth daily, Disp: 90 tablet, Rfl: 5    albuterol (PROVENTIL HFA,VENTOLIN HFA) 90 mcg/act inhaler, TAKE 2 PUFFS BY MOUTH EVERY 4 HOURS AS NEEDED FOR WHEEZE (Patient not taking: Reported on 12/20/2021), Disp: , Rfl:     fluticasone-salmeterol (Advair) 250-50 mcg/dose inhaler, Inhale 1 puff 2 (two) times a day Rinse mouth after use  (Patient not taking: Reported on 12/20/2021 ), Disp: , Rfl:     nitroglycerin (NITROSTAT) 0 4 mg SL tablet, Place 0 4 mg under the tongue (Patient not taking: Reported on 12/20/2021 ), Disp: , Rfl:     Current Facility-Administered Medications:     denosumab (PROLIA) subcutaneous injection 60 mg, 60 mg, Subcutaneous, Q6 Months, YOSEF Sagastume, 60 mg at 12/20/21 1032  Allergies   Allergen Reactions    Ace Inhibitors Hives     Renal cell ca with prior nephrectomy, CKD with JESSICA recently   Baclofen      Sedation toxicity     Bactrim [Sulfamethoxazole-Trimethoprim] Dizziness and Fatigue     Severe fatigue    Bupropion      Violent     Cefuroxime Dizziness    Fentanyl Myalgia     "i lost control of my left arm and left leg"    Mirapex [Pramipexole] Other (See Comments)     Dark or brown urine    Norvasc [Amlodipine] Nausea Only and Other (See Comments)     Bad taste in mouth    Pregabalin      Weight gain, become manic    Topiramate      Increased pain and body aches        Past Medical History:   Diagnosis Date    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[address] )     [facility]/2017    Nephrology: Dr Viviane Madison      CAD (coronary artery disease)     Cancer (HCC)     Chronic kidney disease     Chronic pain     Depression     Diabetes mellitus ([place] [address] )     Encephalopathy acute 2017    Hematuria 2020:  Urinalysis negative for blood   Hyperlipidemia     Hypertension     Macular degeneration     NSTEMI (non-ST elevated myocardial infarction) ([place] [address] ) 2017    Last Assessment & Plan:  S/p PARVEEN to OM1 (at proximal margin of previous stent)  No other significant CAD  I recommend aggressive risk factor modification  We discussed the importance of taking Plavix and Aspirin everyday as prescribed and not missing any doses for a minimum of one year  Aspirin will be continued indefinitely (of note, he's been on long-term DAPT since his MI 17)  The imp    Personal history of kidney cancer     Prostate cancer ([place] [address] )     Spinal cord injury, cervical region ([place] [address] )     Type 2 diabetes mellitus ([place] [address] )      Social History     Socioeconomic History    Marital status:       Spouse name: Not on file    Number of children: Not on file    Years of education: Not on file    Highest education level: Not on file   Occupational History    Occupation:    Tobacco Use    Smoking status: Current Every Day Smoker     Packs/day: 1 00     Years: 35 00     Pack years: 35 00     Types: Cigarettes, Cigars     Last attempt to quit: 3/14/2021     Years since quittin 0    Smokeless tobacco: Never Used    Tobacco comment: On and off smoker   Vaping Use    Vaping Use: Never used   Substance and Sexual Activity    Alcohol use: Not Currently     Alcohol/week: 0 0 standard drinks    Drug use: Never    Sexual activity: Not Currently     Partners: Female   Other Topics Concern    Not on file   Social History Narrative    Not on file     Social Determinants of Health     Financial Resource Strain: Not on file   Food Insecurity: Not on file   Transportation Needs: Not on file   Physical Activity: Not on file   Stress: Not on file   Social Connections: Not on file   Intimate Partner Violence: Not on file   Housing Stability: Not on file      Family History   Problem Relation Age of Onset    Diabetes Mother     Heart attack Mother     Heart failure Mother     Diabetes Father     Heart attack Father     Diabetes type I Father     Lupus Sister     Diabetes Brother     Heart disease Brother      Past Surgical History:   Procedure Laterality Date    CERVICAL SPINE SURGERY      COLONOSCOPY W/ BIOPSIES      LAMINECTOMY      PARTIAL NEPHRECTOMY Right 02/01/2017    SPINE SURGERY  2003       PREVIOUS WEIGHTS:   Wt Readings from Last 10 Encounters:   03/17/22 65 kg (143 lb 3 2 oz)   02/25/22 63 5 kg (140 lb)   12/20/21 66 4 kg (146 lb 6 oz)   11/19/21 64 9 kg (143 lb)   09/30/21 65 8 kg (145 lb)   08/17/21 64 kg (141 lb)   08/09/21 62 7 kg (138 lb 3 2 oz)   07/30/21 64 kg (141 lb)   07/12/21 62 2 kg (137 lb 2 oz)   06/25/21 64 9 kg (143 lb)        Review of Systems:  Review of Systems   Constitutional: Negative for activity change  Respiratory: Negative for cough, chest tightness, shortness of breath and wheezing  Cardiovascular: Negative for chest pain, palpitations and leg swelling  Musculoskeletal: Negative for gait problem  Skin: Negative for color change  Neurological: Negative for dizziness, tremors, syncope, weakness, light-headedness and headaches  Psychiatric/Behavioral: Negative for agitation and confusion  Physical Exam:  /74 (BP Location: Left arm, Patient Position: Sitting, Cuff Size: Large)   Pulse 61   Wt 65 kg (143 lb 3 2 oz)   BMI 22 43 kg/m²     Physical Exam  Vitals reviewed  Constitutional:       General: He is not in acute distress  Appearance: He is well-developed  HENT:      Head: Normocephalic and atraumatic  Neck:      Thyroid: No thyromegaly  Vascular: No carotid bruit or JVD  Trachea: No tracheal deviation  Cardiovascular:      Rate and Rhythm: Normal rate and regular rhythm  Pulses: Normal pulses  Heart sounds: Normal heart sounds   No murmur heard   No friction rub  No gallop  Pulmonary:      Effort: Pulmonary effort is normal  No respiratory distress  Breath sounds: Normal breath sounds  No wheezing, rhonchi or rales  Chest:      Chest wall: No tenderness  Musculoskeletal:      Cervical back: Normal range of motion and neck supple  Right lower leg: No edema  Left lower leg: No edema  Skin:     General: Skin is warm and dry  Neurological:      General: No focal deficit present  Mental Status: He is alert and oriented to person, place, and time  Psychiatric:         Mood and Affect: Mood normal          Behavior: Behavior normal          Thought Content: Thought content normal          Judgment: Judgment normal          ======================================================  Imaging:   I have personally reviewed pertinent reports  Portions of the record may have been created with voice recognition software  Occasional wrong word or "sound a like" substitutions may have occurred due to the inherent limitations of voice recognition software  Read the chart carefully and recognize, using context, where substitutions have occurred      SIGNATURES:   Wander Del Angel MD

## 2022-03-21 NOTE — TELEPHONE ENCOUNTER
Pt reached out via [employer] for a refill on Mirtazapine 45 mg  We questioned his dose as the Med list does not reflect this and pt confirmed 45 mg is his current dose from last OV  Info found in note from 2/2022  Depression        Using Mirtazepine 30mg 1 5 tabs  Agree to change to 45mg tabs  He has been on other meds before, but not able to take

## 2022-03-21 NOTE — TELEPHONE ENCOUNTER
----- Message from Sophy Moran sent at 3/21/2022  8:56 AM EDT -----  Regarding: Drug renewal   It was changed  to 45mg a few weeks ago

## 2022-05-03 PROBLEM — Z86.73 OLD CEREBROVASCULAR ACCIDENT (CVA) WITHOUT LATE EFFECT: Status: ACTIVE | Noted: 2022-01-01

## 2022-05-03 PROBLEM — R29.898 COGWHEEL RIGIDITY: Status: ACTIVE | Noted: 2022-01-01

## 2022-05-03 NOTE — ASSESSMENT & PLAN NOTE
Some cogwheeling noted in the right upper extremity  Left upper extremity did not exhibit the same amount  He does have some tremors overall  This brings up the possibility of Parkinson's  Would recommend follow-up with Neurology  Would not just add medication for this patient as he is having issues with dizziness still, and I do not want to cloud the Lefty RAMIREZ as far as diagnosis for that

## 2022-05-03 NOTE — ASSESSMENT & PLAN NOTE
Patient does have chronic anemia  Check labs  Likely secondary to kidney function issues  Last visit from Nephrology mentioned that he is not candidate for Epogen  This is due to the history of renal cell carcinoma, and prostate carcinoma

## 2022-05-03 NOTE — ASSESSMENT & PLAN NOTE
Patient does have heart disease  Following with Cardiology  Of note, the patient is currently off Imdur, as well as amlodipine  Question if this is heart related as well  Recommend follow-up with Cardiology at some point  Of note, he has been to Cardiology in discuss this previously

## 2022-05-03 NOTE — PROGRESS NOTES
Assessment and Plan:    Problem List Items Addressed This Visit     Anemia of chronic disease     Patient does have chronic anemia  Check labs  Likely secondary to kidney function issues  Last visit from Nephrology mentioned that he is not candidate for Epogen  This is due to the history of renal cell carcinoma, and prostate carcinoma  Relevant Orders    Comprehensive metabolic panel    CBC and differential    Cervical disc disorder with radiculopathy of mid-cervical region     A change in the dizzinessSignificant chronic pain  Currently on morphine from pain management  Noted that there were days where he was able to go 48 hours without needing pain medications  On days when he did not have pain medications, he did not Have changes in dizziness  Chronic kidney disease, stage 4 (severe) (HCC) - Primary     Lab Results   Component Value Date    EGFR 16 03/01/2022    EGFR 17 11/17/2021    EGFR 18 04/20/2021    CREATININE 3 37 (H) 03/01/2022    CREATININE 3 31 (H) 11/17/2021    CREATININE 3 22 (H) 04/20/2021   GFR continues to be quite low  Following with Nephrology  Recheck labs  Relevant Orders    Comprehensive metabolic panel    Clear cell carcinoma of kidney (HCC)    Relevant Orders    Comprehensive metabolic panel    Cogwheel rigidity     Some cogwheeling noted in the right upper extremity  Left upper extremity did not exhibit the same amount  He does have some tremors overall  This brings up the possibility of Parkinson's  Would recommend follow-up with Neurology  Would not just add medication for this patient as he is having issues with dizziness still, and I do not want to cloud the Lefty RAMIREZ as far as diagnosis for that             Relevant Orders    Comprehensive metabolic panel    CBC and differential    TSH, 3rd generation    C-reactive protein    CK (with reflex to MB)    Ambulatory Referral to Neurology    Continuous opioid dependence ([facility] [address] )     Seeing pain management for this, and on Morphine  Coronary artery disease involving native coronary artery of native heart without angina pectoris     Patient does have heart disease  Following with Cardiology  Of note, the patient is currently off Imdur, as well as amlodipine  Question if this is heart related as well  Recommend follow-up with Cardiology at some point  Of note, he has been to Cardiology in discuss this previously  Relevant Orders    Comprehensive metabolic panel    CBC and differential    TSH, 3rd generation    C-reactive protein    CK (with reflex to MB)    ESRD (end stage renal disease) ([facility] [address] )     Lab Results   Component Value Date    EGFR 16 03/01/2022    EGFR 17 11/17/2021    EGFR 18 04/20/2021    CREATININE 3 37 (H) 03/01/2022    CREATININE 3 31 (H) 11/17/2021    CREATININE 3 22 (H) 04/20/2021   Per renal, who states patient is CKD 4         Hypertension     Blood pressure today is at goal for what is presented by Nephrology  I would not make any adjustments  He is currently not taking amlodipine, but is taking Coreg  Old cerebrovascular accident (CVA) without late effect     MRI at [facility] did show small CVA  This may cause some of his symptoms, but I am not sure if it will 100%  Since it was last year in July, could repeat the MRI  Would recommend follow-up with Neurology  Check laboratory studies               Relevant Orders    Ambulatory Referral to Neurology    Ambulatory Referral to Otolaryngology      Other Visit Diagnoses     Viral illness        Relevant Orders    S[facility]2 Antib[facility] (IgG, IgA, IgM[facility]    C-reactive protein    CK (with reflex to MB)    Sedimentation rate, automated    Lyme Antibody Profile with reflex to WB    Dizziness        Relevant Orders    Comprehensive metabolic panel    CBC and differential    TSH, 3rd generation    C-reactive protein    CK (with reflex to MB)    Sedimentation rate, automated    Lyme Antibody Profile with reflex to WB    Ambulatory Referral to Neurology    Ambulatory Referral to Otolaryngology                 Diagnoses and all orders for this visit:    Chronic kidney disease, stage 4 (severe) ([facility] [address] )  -     Comprehensive metabolic panel; Future    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)  -     Comprehensive metabolic panel; Future    Old cerebrovascular accident (CVA) without late effect  -     Ambulatory Referral to Neurology; Future  -     Ambulatory Referral to Otolaryngology; Future    Cogwheel rigidity  -     Comprehensive metabolic panel; Future  -     CBC and differential; Future  -     TSH, 3rd generation; Future  -     C-reactive protein; Future  -     CK (with reflex to MB); Future  -     Ambulatory Referral to Neurology; Future    Anemia of chronic disease  -     Comprehensive metabolic panel; Future  -     CBC and differential; Future    Cervical disc disorder with radiculopathy of mid-cervical region    Continuous opioid dependence (HCC)    Coronary artery disease involving native coronary artery of native heart without angina pectoris  -     Comprehensive metabolic panel; Future  -     CBC and differential; Future  -     TSH, 3rd generation; Future  -     C-reactive protein; Future  -     CK (with reflex to MB); Future    Primary hypertension    Viral illness  -     SARS-CoV2 Antibody, Total (IgG, IgA, IgM) [facility]- Lab Collect; Future  -     C-reactive protein; Future  -     CK (with reflex to MB); Future  -     Sedimentation rate, automated; Future  -     Lyme Antibody Profile with reflex to WB; Future    Dizziness  -     Comprehensive metabolic panel; Future  -     CBC and differential; Future  -     TSH, 3rd generation; Future  -     C-reactive protein; Future  -     CK (with reflex to MB); Future  -     Sedimentation rate, automated; Future  -     Lyme Antibody Profile with reflex to WB; Future  -     Ambulatory Referral to Neurology;  Future  -     Ambulatory Referral to Otolaryngology; Future    ESRD (end stage renal disease) ([facility] [address] )    Other orders  -     morphine (MSIR) 15 mg tablet; TAKE 1 TABLET BY MOUTH THREE TIMES A DAY AS NEEDED FOR 30 DAYS            Subjective:      Patient ID: Adryan Gu is a 68 y o  male  CC:    Chief Complaint   Patient presents with    Dizziness     per pt states he been feeling dizzy for over a year, he is okay if he is seating down  He also state he loose his sense of taste from time to time       HPI:    Patient is quite botherered about the dizziness  He has reviewed all medications, and cant seem to figure it out  COVID vaccine video was reviewed by patient, and protein spike was noted  There was some discussion about Ivermectin then treating this  Has had some numbness and feeling stoned at times with this  He is not functioning well  Had quit smoking, but just restarted  Feels better now with this  Has tremors as well  At one point he was started on isosorbide, and norvasc  Stopped both, but no changes  He did not restart either  In past, when off Oxy, felt great  He is on MSO4 now, and noted there were times he did not take it for 48 hours  During the 48 hours when he was not taking morphine, the patient still had the symptoms of dizziness and lightheadedness  Reviewed ESRD  He did have shunt placed  His last GFR was 18 in March  The following portions of the patient's history were reviewed and updated as appropriate: allergies, current medications, past family history, past medical history, past social history, past surgical history and problem list       Review of Systems   Constitutional: Positive for fatigue  HENT: Negative  Respiratory: Negative  Cardiovascular: Negative  Neurological: Positive for dizziness, speech difficulty and weakness  All other systems reviewed and are negative          Data to review:       Objective:    Vitals:    05/03/22 1254   BP: 130/70   BP Location: Right arm   Patient Position: Sitting   Cuff Size: Standard   Temp: 98 1 °F (36 7 °C)   TempSrc: Tympanic   Weight: 62 1 kg (136 lb 12 8 oz)   Height: 5' 7" (1 702 m)        Physical Exam  Vitals and nursing note reviewed  Constitutional:       Appearance: Normal appearance  Neck:      Vascular: No carotid bruit  Cardiovascular:      Rate and Rhythm: Normal rate and regular rhythm  Pulses: Normal pulses  Carotid pulses are 2+ on the right side and 2+ on the left side  Heart sounds: Normal heart sounds  No murmur heard  No gallop  Pulmonary:      Effort: Pulmonary effort is normal  No respiratory distress  Breath sounds: Normal breath sounds  No stridor  No wheezing, rhonchi or rales  Chest:      Chest wall: No tenderness  Neurological:      Mental Status: He is alert  Comments: Cogwheel rigidity in right arm

## 2022-05-03 NOTE — ASSESSMENT & PLAN NOTE
MRI at [facility] did show small CVA  This may cause some of his symptoms, but I am not sure if it will 100%  Since it was last year in July, could repeat the MRI  Would recommend follow-up with Neurology  Check laboratory studies

## 2022-05-03 NOTE — ASSESSMENT & PLAN NOTE
Lab Results   Component Value Date    EGFR 16 03/01/2022    EGFR 17 11/17/2021    EGFR 18 04/20/2021    CREATININE 3 37 (H) 03/01/2022    CREATININE 3 31 (H) 11/17/2021    CREATININE 3 22 (H) 04/20/2021   GFR continues to be quite low  Following with Nephrology  Recheck labs

## 2022-05-03 NOTE — ASSESSMENT & PLAN NOTE
Blood pressure today is at goal for what is presented by Nephrology  I would not make any adjustments  He is currently not taking amlodipine, but is taking Coreg

## 2022-05-03 NOTE — PATIENT INSTRUCTIONS
Problem List Items Addressed This Visit     Anemia of chronic disease     Patient does have chronic anemia  Check labs  Likely secondary to kidney function issues  Last visit from Nephrology mentioned that he is not candidate for Epogen  This is due to the history of renal cell carcinoma, and prostate carcinoma  Relevant Orders    Comprehensive metabolic panel    CBC and differential    Cervical disc disorder with radiculopathy of mid-cervical region     A change in the dizzinessSignificant chronic pain  Currently on morphine from pain management  Noted that there were days where he was able to go 48 hours without needing pain medications  On days when he did not have pain medications, he did not Have changes in dizziness  Chronic kidney disease, stage 4 (severe) (HCC) - Primary     Lab Results   Component Value Date    EGFR 16 03/01/2022    EGFR 17 11/17/2021    EGFR 18 04/20/2021    CREATININE 3 37 (H) 03/01/2022    CREATININE 3 31 (H) 11/17/2021    CREATININE 3 22 (H) 04/20/2021   GFR continues to be quite low  Following with Nephrology  Recheck labs  Relevant Orders    Comprehensive metabolic panel    Clear cell carcinoma of kidney (HCC)    Relevant Orders    Comprehensive metabolic panel    Cogwheel rigidity     Some cogwheeling noted in the right upper extremity  Left upper extremity did not exhibit the same amount  He does have some tremors overall  This brings up the possibility of Parkinson's  Would recommend follow-up with Neurology  Would not just add medication for this patient as he is having issues with dizziness still, and I do not want to cloud the Lefty RAMIREZ as far as diagnosis for that  Relevant Orders    Comprehensive metabolic panel    CBC and differential    TSH, 3rd generation    C-reactive protein    CK (with reflex to MB)    Ambulatory Referral to Neurology    Continuous opioid dependence ([facility] [address] )     Seeing pain management for this, and on Morphine  Coronary artery disease involving native coronary artery of native heart without angina pectoris     Patient does have heart disease  Following with Cardiology  Of note, the patient is currently off Imdur, as well as amlodipine  Question if this is heart related as well  Recommend follow-up with Cardiology at some point  Of note, he has been to Cardiology in discuss this previously  Relevant Orders    Comprehensive metabolic panel    CBC and differential    TSH, 3rd generation    C-reactive protein    CK (with reflex to MB)    ESRD (end stage renal disease) ([facility] [address] )     Lab Results   Component Value Date    EGFR 16 03/01/2022    EGFR 17 11/17/2021    EGFR 18 04/20/2021    CREATININE 3 37 (H) 03/01/2022    CREATININE 3 31 (H) 11/17/2021    CREATININE 3 22 (H) 04/20/2021   Per renal, who states patient is CKD 4         Hypertension     Blood pressure today is at goal for what is presented by Nephrology  I would not make any adjustments  He is currently not taking amlodipine, but is taking Coreg  Old cerebrovascular accident (CVA) without late effect     MRI at [facility] did show small CVA  This may cause some of his symptoms, but I am not sure if it will 100%  Since it was last year in July, could repeat the MRI  Would recommend follow-up with Neurology  Check laboratory studies               Relevant Orders    Ambulatory Referral to Neurology    Ambulatory Referral to Otolaryngology      Other Visit Diagnoses     Viral illness        Relevant Orders    SARS-CoV2 Antibody, Total (IgG, IgA, IgM) [facility]    C-reactive protein    CK (with reflex to MB)    Sedimentation rate, automated    Lyme Antibody Profile with reflex to WB    Dizziness        Relevant Orders    Comprehensive metabolic panel    CBC and differential    TSH, 3rd generation    C-reactive protein    CK (with reflex to MB)    Sedimentation rate, automated    Lyme Antibody Profile with reflex to WB    Ambulatory Referral to Neurology    Ambulatory Referral to Otolaryngology          COVID 19 Instructions    Alejandrina Godinez was advised to limit contact with others to essential tasks such as getting food, medications, and medical care  Proper handwashing reviewed, and Hand sanitzer when washing is not available  If the patient develops symptoms of COVID 19, the patient should call the office as soon as possible  For 8281-4888 Flu season, it is strongly recommended that Flu Vaccinations be obtained  Virtual Visits:  [place]: This works on smart phones (any phone with Internet browsing capability)  You should get a text message when the provider is ready to see you  Click on the link in the text message, and the call should start  You will need to type in your name, and allow camera and microphone access  This is HIPPA compliant, and secure  If you have not already done so, get immunized to COVID 19  We are committed to getting you vaccinated as soon as possible and will be closely following CDC and [facility]  guidelines as they are released and revised  Please refer to our COVID-19 vaccine webpage for the most up to date information on the vaccine and our distribution efforts  This site will also have the most up to date recommendations for COVID booster vaccine  [facility]    Call [phone] ([phone]), option 7    OUR NEW LOCATION:    [address], [address] [place], [place] [address], [place], [address]  Fax: [phone]    Lab services and OB/GYN are at this location as well

## 2022-05-03 NOTE — ASSESSMENT & PLAN NOTE
Lab Results   Component Value Date    EGFR 16 03/01/2022    EGFR 17 11/17/2021    EGFR 18 04/20/2021    CREATININE 3 37 (H) 03/01/2022    CREATININE 3 31 (H) 11/17/2021    CREATININE 3 22 (H) 04/20/2021   Per renal, who states patient is CKD 4

## 2022-05-03 NOTE — ASSESSMENT & PLAN NOTE
A change in the dizzinessSignificant chronic pain  Currently on morphine from pain management  Noted that there were days where he was able to go 48 hours without needing pain medications  On days when he did not have pain medications, he did not Have changes in dizziness

## 2022-05-12 PROBLEM — R35.0 INCREASED FREQUENCY OF URINATION: Status: RESOLVED | Noted: 2018-09-06 | Resolved: 2022-01-01

## 2022-05-12 NOTE — TELEPHONE ENCOUNTER
Notified by Ida Vega from the 's office  Patient was found in his residence  today  Use last heard from Tuesday 05/10/2022 when he told his family he did not feel well  They did not hear from him and found him to sees today  Per corner cause of death is natural the believe it is an MI  I reviewed the chart with 's office  Patient does have a history of CAD and ready had an MI, type 2 diabetes, hypertension, and CKD-4  I told 's office in Dr Ayo Webb absence, I would gladly sign death certificate  I will use acute MI as cause of death  They will have  bring test was given to the office    I did explain it needs to be here by noon tomorrow otherwise it will wait till next week